# Patient Record
Sex: FEMALE | Race: WHITE | NOT HISPANIC OR LATINO | Employment: OTHER | URBAN - NONMETROPOLITAN AREA
[De-identification: names, ages, dates, MRNs, and addresses within clinical notes are randomized per-mention and may not be internally consistent; named-entity substitution may affect disease eponyms.]

---

## 2019-07-06 ENCOUNTER — APPOINTMENT (OUTPATIENT)
Dept: GENERAL RADIOLOGY | Facility: HOSPITAL | Age: 74
End: 2019-07-06

## 2019-07-06 ENCOUNTER — HOSPITAL ENCOUNTER (INPATIENT)
Facility: HOSPITAL | Age: 74
LOS: 4 days | Discharge: HOME | End: 2019-07-10
Attending: EMERGENCY MEDICINE | Admitting: FAMILY MEDICINE

## 2019-07-06 DIAGNOSIS — J44.1 CHRONIC OBSTRUCTIVE PULMONARY DISEASE WITH ACUTE EXACERBATION (HCC): Primary | ICD-10-CM

## 2019-07-06 DIAGNOSIS — I50.9 CONGESTIVE HEART FAILURE, UNSPECIFIED HF CHRONICITY, UNSPECIFIED HEART FAILURE TYPE (HCC): ICD-10-CM

## 2019-07-06 DIAGNOSIS — E13.8 DIABETES MELLITUS OF OTHER TYPE WITH COMPLICATION, UNSPECIFIED WHETHER LONG TERM INSULIN USE: ICD-10-CM

## 2019-07-06 DIAGNOSIS — I25.118 CORONARY ARTERY DISEASE WITH OTHER FORM OF ANGINA PECTORIS, UNSPECIFIED VESSEL OR LESION TYPE, UNSPECIFIED WHETHER NATIVE OR TRANSPLANTED HEART (HCC): ICD-10-CM

## 2019-07-06 LAB
ALBUMIN SERPL-MCNC: 4.2 G/DL (ref 3.5–5)
ALBUMIN/GLOB SERPL: 1.4 G/DL (ref 1.1–2.5)
ALP SERPL-CCNC: 108 U/L (ref 24–120)
ALT SERPL W P-5'-P-CCNC: 21 U/L (ref 0–54)
ANION GAP SERPL CALCULATED.3IONS-SCNC: 6 MMOL/L (ref 4–13)
ARTERIAL PATENCY WRIST A: POSITIVE
AST SERPL-CCNC: 36 U/L (ref 7–45)
ATMOSPHERIC PRESS: 750 MMHG
BASE EXCESS BLDA CALC-SCNC: 7.1 MMOL/L (ref 0–2)
BASOPHILS # BLD AUTO: 0.05 10*3/MM3 (ref 0–0.2)
BASOPHILS NFR BLD AUTO: 0.4 % (ref 0–2)
BDY SITE: ABNORMAL
BILIRUB SERPL-MCNC: 0.3 MG/DL (ref 0.1–1)
BODY TEMPERATURE: 37 C
BUN BLD-MCNC: 24 MG/DL (ref 5–21)
BUN/CREAT SERPL: 42.1 (ref 7–25)
CALCIUM SPEC-SCNC: 9.2 MG/DL (ref 8.4–10.4)
CHLORIDE SERPL-SCNC: 97 MMOL/L (ref 98–110)
CO2 SERPL-SCNC: 38 MMOL/L (ref 24–31)
CREAT BLD-MCNC: 0.57 MG/DL (ref 0.5–1.4)
D-LACTATE SERPL-SCNC: 1.3 MMOL/L (ref 0.5–2)
DEPRECATED RDW RBC AUTO: 49.7 FL (ref 40–54)
EOSINOPHIL # BLD AUTO: 0.92 10*3/MM3 (ref 0–0.7)
EOSINOPHIL NFR BLD AUTO: 7.7 % (ref 0–4)
ERYTHROCYTE [DISTWIDTH] IN BLOOD BY AUTOMATED COUNT: 14.8 % (ref 12–15)
GAS FLOW AIRWAY: 12 LPM
GFR SERPL CREATININE-BSD FRML MDRD: 104 ML/MIN/1.73
GLOBULIN UR ELPH-MCNC: 3.1 GM/DL
GLUCOSE BLD-MCNC: 169 MG/DL (ref 70–100)
GLUCOSE BLDC GLUCOMTR-MCNC: 230 MG/DL (ref 70–130)
GLUCOSE BLDC GLUCOMTR-MCNC: 262 MG/DL (ref 70–130)
HCO3 BLDA-SCNC: 34.5 MMOL/L (ref 20–26)
HCT VFR BLD AUTO: 38.6 % (ref 37–47)
HGB BLD-MCNC: 11.7 G/DL (ref 12–16)
HOLD SPECIMEN: NORMAL
HOROWITZ INDEX BLD+IHG-RTO: 100 %
IMM GRANULOCYTES # BLD AUTO: 0.03 10*3/MM3 (ref 0–0.05)
IMM GRANULOCYTES NFR BLD AUTO: 0.3 % (ref 0–5)
LYMPHOCYTES # BLD AUTO: 1.53 10*3/MM3 (ref 0.72–4.86)
LYMPHOCYTES NFR BLD AUTO: 12.9 % (ref 15–45)
Lab: ABNORMAL
MCH RBC QN AUTO: 27.9 PG (ref 28–32)
MCHC RBC AUTO-ENTMCNC: 30.3 G/DL (ref 33–36)
MCV RBC AUTO: 92.1 FL (ref 82–98)
MODALITY: ABNORMAL
MONOCYTES # BLD AUTO: 0.73 10*3/MM3 (ref 0.19–1.3)
MONOCYTES NFR BLD AUTO: 6.1 % (ref 4–12)
NEUTROPHILS # BLD AUTO: 8.62 10*3/MM3 (ref 1.87–8.4)
NEUTROPHILS NFR BLD AUTO: 72.6 % (ref 39–78)
NRBC BLD AUTO-RTO: 0 /100 WBC (ref 0–0.2)
PCO2 BLDA: 62.3 MM HG (ref 35–45)
PH BLDA: 7.35 PH UNITS (ref 7.35–7.45)
PLATELET # BLD AUTO: 242 10*3/MM3 (ref 130–400)
PMV BLD AUTO: 10.2 FL (ref 6–12)
PO2 BLDA: 279 MM HG (ref 83–108)
POTASSIUM BLD-SCNC: 4.4 MMOL/L (ref 3.5–5.3)
PROT SERPL-MCNC: 7.3 G/DL (ref 6.3–8.7)
RBC # BLD AUTO: 4.19 10*6/MM3 (ref 4.2–5.4)
SAO2 % BLDCOA: 99.7 % (ref 94–99)
SODIUM BLD-SCNC: 141 MMOL/L (ref 135–145)
VENTILATOR MODE: ABNORMAL
WBC NRBC COR # BLD: 11.88 10*3/MM3 (ref 4.8–10.8)
WHOLE BLOOD HOLD SPECIMEN: NORMAL
WHOLE BLOOD HOLD SPECIMEN: NORMAL

## 2019-07-06 PROCEDURE — 94799 UNLISTED PULMONARY SVC/PX: CPT

## 2019-07-06 PROCEDURE — 93005 ELECTROCARDIOGRAM TRACING: CPT | Performed by: EMERGENCY MEDICINE

## 2019-07-06 PROCEDURE — 85025 COMPLETE CBC W/AUTO DIFF WBC: CPT | Performed by: EMERGENCY MEDICINE

## 2019-07-06 PROCEDURE — 25010000002 METHYLPREDNISOLONE PER 125 MG: Performed by: FAMILY MEDICINE

## 2019-07-06 PROCEDURE — 80053 COMPREHEN METABOLIC PANEL: CPT | Performed by: EMERGENCY MEDICINE

## 2019-07-06 PROCEDURE — 71045 X-RAY EXAM CHEST 1 VIEW: CPT

## 2019-07-06 PROCEDURE — 94760 N-INVAS EAR/PLS OXIMETRY 1: CPT

## 2019-07-06 PROCEDURE — 87040 BLOOD CULTURE FOR BACTERIA: CPT | Performed by: EMERGENCY MEDICINE

## 2019-07-06 PROCEDURE — 36600 WITHDRAWAL OF ARTERIAL BLOOD: CPT

## 2019-07-06 PROCEDURE — 83605 ASSAY OF LACTIC ACID: CPT | Performed by: EMERGENCY MEDICINE

## 2019-07-06 PROCEDURE — 93010 ELECTROCARDIOGRAM REPORT: CPT | Performed by: INTERNAL MEDICINE

## 2019-07-06 PROCEDURE — 82962 GLUCOSE BLOOD TEST: CPT

## 2019-07-06 PROCEDURE — 63710000001 INSULIN LISPRO (HUMAN) PER 5 UNITS: Performed by: FAMILY MEDICINE

## 2019-07-06 PROCEDURE — 94640 AIRWAY INHALATION TREATMENT: CPT

## 2019-07-06 PROCEDURE — 99285 EMERGENCY DEPT VISIT HI MDM: CPT

## 2019-07-06 PROCEDURE — 25010000002 METHYLPREDNISOLONE PER 125 MG: Performed by: EMERGENCY MEDICINE

## 2019-07-06 PROCEDURE — 82803 BLOOD GASES ANY COMBINATION: CPT

## 2019-07-06 RX ORDER — BUDESONIDE 0.5 MG/2ML
0.5 INHALANT ORAL
Status: DISCONTINUED | OUTPATIENT
Start: 2019-07-06 | End: 2019-07-10 | Stop reason: HOSPADM

## 2019-07-06 RX ORDER — METHYLPREDNISOLONE SODIUM SUCCINATE 125 MG/2ML
80 INJECTION, POWDER, LYOPHILIZED, FOR SOLUTION INTRAMUSCULAR; INTRAVENOUS EVERY 8 HOURS
Status: DISCONTINUED | OUTPATIENT
Start: 2019-07-06 | End: 2019-07-08

## 2019-07-06 RX ORDER — DOCUSATE SODIUM 100 MG/1
100 CAPSULE, LIQUID FILLED ORAL 2 TIMES DAILY PRN
COMMUNITY

## 2019-07-06 RX ORDER — BUDESONIDE 0.5 MG/2ML
0.5 INHALANT ORAL 2 TIMES DAILY
COMMUNITY

## 2019-07-06 RX ORDER — ESCITALOPRAM OXALATE 10 MG/1
10 TABLET ORAL DAILY
COMMUNITY

## 2019-07-06 RX ORDER — LORATADINE 10 MG/1
10 TABLET ORAL DAILY
COMMUNITY
End: 2019-07-10 | Stop reason: HOSPADM

## 2019-07-06 RX ORDER — DILTIAZEM HYDROCHLORIDE 120 MG/1
120 CAPSULE, COATED, EXTENDED RELEASE ORAL DAILY
COMMUNITY

## 2019-07-06 RX ORDER — METHYLPREDNISOLONE SODIUM SUCCINATE 125 MG/2ML
125 INJECTION, POWDER, LYOPHILIZED, FOR SOLUTION INTRAMUSCULAR; INTRAVENOUS ONCE
Status: COMPLETED | OUTPATIENT
Start: 2019-07-06 | End: 2019-07-06

## 2019-07-06 RX ORDER — MONTELUKAST SODIUM 10 MG/1
10 TABLET ORAL NIGHTLY
Status: ON HOLD | COMMUNITY
End: 2020-06-01

## 2019-07-06 RX ORDER — ATORVASTATIN CALCIUM 80 MG/1
80 TABLET, FILM COATED ORAL NIGHTLY
COMMUNITY

## 2019-07-06 RX ORDER — ESCITALOPRAM OXALATE 10 MG/1
10 TABLET ORAL DAILY
Status: DISCONTINUED | OUTPATIENT
Start: 2019-07-06 | End: 2019-07-10 | Stop reason: HOSPADM

## 2019-07-06 RX ORDER — IPRATROPIUM BROMIDE AND ALBUTEROL SULFATE 2.5; .5 MG/3ML; MG/3ML
3 SOLUTION RESPIRATORY (INHALATION) EVERY 4 HOURS PRN
Status: ON HOLD | COMMUNITY
End: 2019-07-10 | Stop reason: SDUPTHER

## 2019-07-06 RX ORDER — FLUTICASONE PROPIONATE 50 MCG
2 SPRAY, SUSPENSION (ML) NASAL DAILY
Status: DISCONTINUED | OUTPATIENT
Start: 2019-07-06 | End: 2019-07-10 | Stop reason: HOSPADM

## 2019-07-06 RX ORDER — SODIUM CHLORIDE 0.9 % (FLUSH) 0.9 %
10 SYRINGE (ML) INJECTION AS NEEDED
Status: DISCONTINUED | OUTPATIENT
Start: 2019-07-06 | End: 2019-07-10 | Stop reason: HOSPADM

## 2019-07-06 RX ORDER — NICOTINE POLACRILEX 4 MG
15 LOZENGE BUCCAL
Status: DISCONTINUED | OUTPATIENT
Start: 2019-07-06 | End: 2019-07-07 | Stop reason: SDUPTHER

## 2019-07-06 RX ORDER — CLOPIDOGREL BISULFATE 75 MG/1
75 TABLET ORAL DAILY
Status: DISCONTINUED | OUTPATIENT
Start: 2019-07-06 | End: 2019-07-10 | Stop reason: HOSPADM

## 2019-07-06 RX ORDER — DEXTROSE MONOHYDRATE 25 G/50ML
25 INJECTION, SOLUTION INTRAVENOUS
Status: DISCONTINUED | OUTPATIENT
Start: 2019-07-06 | End: 2019-07-07 | Stop reason: SDUPTHER

## 2019-07-06 RX ORDER — PANTOPRAZOLE SODIUM 40 MG/1
40 TABLET, DELAYED RELEASE ORAL DAILY
COMMUNITY

## 2019-07-06 RX ORDER — DILTIAZEM HYDROCHLORIDE 180 MG/1
180 CAPSULE, COATED, EXTENDED RELEASE ORAL DAILY
Status: DISCONTINUED | OUTPATIENT
Start: 2019-07-06 | End: 2019-07-10 | Stop reason: HOSPADM

## 2019-07-06 RX ORDER — CLOPIDOGREL BISULFATE 75 MG/1
75 TABLET ORAL DAILY
COMMUNITY

## 2019-07-06 RX ORDER — IPRATROPIUM BROMIDE AND ALBUTEROL SULFATE 2.5; .5 MG/3ML; MG/3ML
3 SOLUTION RESPIRATORY (INHALATION) ONCE
Status: COMPLETED | OUTPATIENT
Start: 2019-07-06 | End: 2019-07-06

## 2019-07-06 RX ORDER — PANTOPRAZOLE SODIUM 40 MG/1
40 TABLET, DELAYED RELEASE ORAL DAILY
Status: DISCONTINUED | OUTPATIENT
Start: 2019-07-06 | End: 2019-07-10 | Stop reason: HOSPADM

## 2019-07-06 RX ORDER — ATORVASTATIN CALCIUM 40 MG/1
80 TABLET, FILM COATED ORAL NIGHTLY
Status: DISCONTINUED | OUTPATIENT
Start: 2019-07-06 | End: 2019-07-10 | Stop reason: HOSPADM

## 2019-07-06 RX ORDER — ARFORMOTEROL TARTRATE 15 UG/2ML
15 SOLUTION RESPIRATORY (INHALATION)
COMMUNITY

## 2019-07-06 RX ORDER — FLUTICASONE PROPIONATE 50 MCG
2 SPRAY, SUSPENSION (ML) NASAL DAILY PRN
COMMUNITY

## 2019-07-06 RX ORDER — GUAIFENESIN 600 MG/1
1200 TABLET, EXTENDED RELEASE ORAL EVERY 12 HOURS SCHEDULED
Status: DISCONTINUED | OUTPATIENT
Start: 2019-07-06 | End: 2019-07-10 | Stop reason: HOSPADM

## 2019-07-06 RX ORDER — IPRATROPIUM BROMIDE AND ALBUTEROL SULFATE 2.5; .5 MG/3ML; MG/3ML
3 SOLUTION RESPIRATORY (INHALATION)
Status: DISCONTINUED | OUTPATIENT
Start: 2019-07-06 | End: 2019-07-10 | Stop reason: HOSPADM

## 2019-07-06 RX ADMIN — CLOPIDOGREL 75 MG: 75 TABLET, FILM COATED ORAL at 17:40

## 2019-07-06 RX ADMIN — IPRATROPIUM BROMIDE AND ALBUTEROL SULFATE 3 ML: 2.5; .5 SOLUTION RESPIRATORY (INHALATION) at 13:59

## 2019-07-06 RX ADMIN — INSULIN LISPRO 6 UNITS: 100 INJECTION, SOLUTION INTRAVENOUS; SUBCUTANEOUS at 20:20

## 2019-07-06 RX ADMIN — METFORMIN HYDROCHLORIDE 500 MG: 500 TABLET ORAL at 17:40

## 2019-07-06 RX ADMIN — FLUTICASONE PROPIONATE 2 SPRAY: 50 SPRAY, METERED NASAL at 14:59

## 2019-07-06 RX ADMIN — GUAIFENESIN 1200 MG: 600 TABLET, EXTENDED RELEASE ORAL at 20:08

## 2019-07-06 RX ADMIN — DESMOPRESSIN ACETATE 80 MG: 0.2 TABLET ORAL at 20:08

## 2019-07-06 RX ADMIN — BUDESONIDE 0.5 MG: 0.5 INHALANT RESPIRATORY (INHALATION) at 14:00

## 2019-07-06 RX ADMIN — IPRATROPIUM BROMIDE AND ALBUTEROL SULFATE 3 ML: 2.5; .5 SOLUTION RESPIRATORY (INHALATION) at 12:19

## 2019-07-06 RX ADMIN — IPRATROPIUM BROMIDE AND ALBUTEROL SULFATE 3 ML: 2.5; .5 SOLUTION RESPIRATORY (INHALATION) at 22:36

## 2019-07-06 RX ADMIN — INSULIN LISPRO 4 UNITS: 100 INJECTION, SOLUTION INTRAVENOUS; SUBCUTANEOUS at 17:39

## 2019-07-06 RX ADMIN — DILTIAZEM HYDROCHLORIDE 180 MG: 180 CAPSULE, COATED, EXTENDED RELEASE ORAL at 14:59

## 2019-07-06 RX ADMIN — IPRATROPIUM BROMIDE AND ALBUTEROL SULFATE 3 ML: 2.5; .5 SOLUTION RESPIRATORY (INHALATION) at 18:53

## 2019-07-06 RX ADMIN — METHYLPREDNISOLONE SODIUM SUCCINATE 80 MG: 125 INJECTION, POWDER, FOR SOLUTION INTRAMUSCULAR; INTRAVENOUS at 17:40

## 2019-07-06 RX ADMIN — METHYLPREDNISOLONE SODIUM SUCCINATE 125 MG: 125 INJECTION, POWDER, FOR SOLUTION INTRAMUSCULAR; INTRAVENOUS at 10:28

## 2019-07-06 RX ADMIN — ESCITALOPRAM 10 MG: 10 TABLET, FILM COATED ORAL at 14:59

## 2019-07-06 RX ADMIN — PANTOPRAZOLE SODIUM 40 MG: 40 TABLET, DELAYED RELEASE ORAL at 14:58

## 2019-07-06 NOTE — H&P
[facility] Medicine Services  HISTORY AND PHYSICAL    Date of Admission: 7/6/2019  Primary Care Physician: Provider, No Known    Subjective     Chief Complaint: SOA    History of Present Illness  Ms. Velasquez is a pleasant 73 year old lady with a history of tobacco abuse and COPD.  She came to [place] to visit her sister.  She lives in [place] about 6 hours from here.  Today she began to feel very SOA.  She is coughing and wheezing.  She denies fevers or chills.  She denies leg swelling or weight gain.  She denies hemoptysis or leg swelling.  She denies recent surgery or immobilization.        Review of Systems   Constitutional: Negative for fatigue and fever.   HENT: Negative for congestion and ear pain.    Eyes: Negative for pain and visual disturbance.   Respiratory: Positive for cough, shortness of breath and wheezing.    Cardiovascular: Negative for chest pain and palpitations.   Gastrointestinal: Negative for diarrhea, nausea and vomiting.   Endocrine: Negative for heat intolerance.   Genitourinary: Negative for dysuria and frequency.   Musculoskeletal: Negative for arthralgias and back pain.   Skin: Negative for rash and wound.   Neurological: Negative for dizziness and light-headedness.   Psychiatric/Behavioral: Negative for confusion. The patient is not nervous/anxious.    All other systems reviewed and are negative.       Otherwise complete ROS reviewed and negative except as mentioned in the HPI.    Past Medical History:   Past Medical History:   Diagnosis Date   • CHF (congestive heart failure) (CMS/HCC)    • COPD (chronic obstructive pulmonary disease) (CMS/HCC)    • Diabetes mellitus (CMS/HCC)    • Emphysema lung (CMS/HCC)    • Mediastinal lymphadenopathy    • Stroke (CMS/HCC)      Past Surgical History:  Past Surgical History:   Procedure Laterality Date   • CAROTID STENT     • CHOLECYSTECTOMY     • COLONOSCOPY     • ESOPHAGUS SURGERY     • SIGMOIDOSCOPY        Social History:  reports that she has quit smoking. She does not have any smokeless tobacco history on file. She reports that she drinks alcohol. She reports that she does not use drugs.    Family History: HTN    Allergies:  Allergies   Allergen Reactions   • Latex Rash     Medications:  Prior to Admission medications    Medication Sig Start Date End Date Taking? Authorizing Provider   arformoterol (BROVANA) 15 MCG/2ML nebulizer solution Take  by nebulization 2 (Two) Times a Day.   Yes Angelica Olson MD   atorvastatin (LIPITOR) 80 MG tablet Take 80 mg by mouth Daily.   Yes Angelica Olson MD   budesonide (PULMICORT) 0.5 MG/2ML nebulizer solution Take 0.5 mg by nebulization Daily.   Yes Angelica Olson MD   clopidogrel (PLAVIX) 75 MG tablet Take 75 mg by mouth Daily.   Yes Angelica Olson MD   diltiaZEM CD (CARDIZEM CD) 180 MG 24 hr capsule Take 180 mg by mouth Daily.   Yes Angelica Olson MD   docusate sodium (COLACE) 100 MG capsule Take 100 mg by mouth 2 (Two) Times a Day.   Yes Angelica Olson MD   escitalopram (LEXAPRO) 10 MG tablet Take 10 mg by mouth Daily.   Yes Angelica Olson MD   fluticasone (FLONASE) 50 MCG/ACT nasal spray 2 sprays into the nostril(s) as directed by provider Daily.   Yes Angelica Olson MD   insulin lispro (humaLOG) 100 UNIT/ML injection Inject 200 Units under the skin into the appropriate area as directed 3 (Three) Times a Day Before Meals.   Yes Angelica Olson MD   ipratropium-albuterol (DUO-NEB) 0.5-2.5 mg/3 ml nebulizer Take 3 mL by nebulization Every 4 (Four) Hours As Needed for Wheezing.   Yes Angelica Olson MD   loratadine (CLARITIN) 10 MG tablet Take 10 mg by mouth Daily.   Yes Angelica Olson MD   metFORMIN (GLUCOPHAGE) 500 MG tablet Take 500 mg by mouth 2 (Two) Times a Day With Meals.   Yes Angelica Olson MD   montelukast (SINGULAIR) 10 MG tablet Take 10 mg by mouth Every Night.   Yes Wesley  "MD Angelica   pantoprazole (PROTONIX) 40 MG EC tablet Take 40 mg by mouth Daily.   Yes Provider, MD Angelica   tiotropium (SPIRIVA) 18 MCG per inhalation capsule Place 1 capsule into inhaler and inhale Daily.   Yes Provider, MD Angelica     Objective     Vital Signs: BP (!) 148/135   Pulse 118   Temp 97.8 °F (36.6 °C) (Oral)   Resp 22   Ht 152.4 cm (60\")   Wt 53.9 kg (118 lb 14.4 oz)   SpO2 (!) 87%   BMI 23.22 kg/m²   Physical Exam   Constitutional: She is oriented to person, place, and time. She appears well-developed and well-nourished.   HENT:   Head: Normocephalic and atraumatic.   Right Ear: External ear normal.   Left Ear: External ear normal.   Nose: Nose normal.   Mouth/Throat: Oropharynx is clear and moist.   Eyes: Conjunctivae and EOM are normal.   Neck: Normal range of motion. Neck supple.   Cardiovascular: Normal rate, regular rhythm and normal heart sounds.   Pulmonary/Chest: Accessory muscle usage present. Tachypnea noted. She is in respiratory distress. She has decreased breath sounds. She has wheezes.   Abdominal: Soft. Bowel sounds are normal. She exhibits no distension. There is no tenderness.   Musculoskeletal: Normal range of motion.   Neurological: She is alert and oriented to person, place, and time.   Skin: Skin is warm and dry.   Psychiatric: She has a normal mood and affect. Her speech is normal and behavior is normal. Cognition and memory are normal.         Results Reviewed:  Lab Results (last 24 hours)     Procedure Component Value Units Date/Time    Blood Gas, Arterial [[id number]]  (Abnormal) Collected:  07/06/19 1310    Specimen:  Arterial Blood Updated:  07/06/19 1316     Site Right Radial     Aneesh's Test Positive     pH, Arterial 7.352 pH units      pCO2, Arterial 62.3 mm Hg      Comment: 83 Value above reference range        pO2, Arterial 279.0 mm Hg      Comment: 83 Value above reference range        HCO3, Arterial 34.5 mmol/L      Comment: 83 Value above reference " range        Base Excess, Arterial 7.1 mmol/L      Comment: 83 Value above reference range        O2 Saturation, Arterial 99.7 %      Comment: 83 Value above reference range        Temperature 37.0 C      Barometric Pressure for Blood Gas 750 mmHg      Modality NRB     FIO2 100 %      Flow Rate 12.0 lpm      Ventilator Mode NA     Collected by [id number]     Comment: Meter: [id number]     :  [id number]       [place] Draw [[id number]] Collected:  07/06/19 1004    Specimen:  Blood Updated:  07/06/19 1145    Narrative:       The following orders were created for panel order [place] Draw.  Procedure                               Abnormality         Status                     ---------                               -----------         ------                     Light Blue Top[[id number]]                                   Final result               Green Top (Gel)[[id number]]                                  Final result               Lavender Top[[id number]]                                     Final result               Red Top[[id number]]                                          Final result               [place] Blood Culture Cornelius...[[id number]]                      Final result                 Please view results for these tests on the individual orders.    Green Top (Gel) [[id number]] Collected:  07/06/19 1004    Specimen:  Blood from Arm, Right Updated:  07/06/19 1145     Extra Tube Hold for add-ons.     Comment: Auto resulted.       Light Blue Top [[id number]] Collected:  07/06/19 1004    Specimen:  Blood from Arm, Right Updated:  07/06/19 1116     Extra Tube hold for add-on     Comment: Auto resulted       Lavender Top [[id number]] Collected:  07/06/19 1004    Specimen:  Blood from Arm, Right Updated:  07/06/19 1116     Extra Tube hold for add-on     Comment: Auto resulted       Red Top [[id number]] Collected:  07/06/19 1004    Specimen:  Blood from Arm, Right Updated:  07/06/19 1116     Extra Tube Hold for add-ons.      Comment: Auto resulted.       [place] Blood Culture Bottle Set [[id number]] Collected:  07/06/19 1004    Specimen:  Blood from Arm, Right Updated:  07/06/19 1116     Extra Tube Hold for add-ons.     Comment: Auto resulted.       Lactic Acid, Plasma [[id number]]  (Normal) Collected:  07/06/19 1004    Specimen:  Blood from Arm, Right Updated:  07/06/19 1049     Lactate 1.3 mmol/L     Comprehensive Metabolic Panel [[id number]]  (Abnormal) Collected:  07/06/19 1004    Specimen:  Blood from Arm, Right Updated:  07/06/19 1048     Glucose 169 mg/dL      BUN 24 mg/dL      Creatinine 0.57 mg/dL      Sodium 141 mmol/L      Potassium 4.4 mmol/L      Chloride 97 mmol/L      CO2 38.0 mmol/L      Calcium 9.2 mg/dL      Total Protein 7.3 g/dL      Albumin 4.20 g/dL      ALT (SGPT) 21 U/L      AST (SGOT) 36 U/L      Alkaline Phosphatase 108 U/L      Total Bilirubin 0.3 mg/dL      eGFR Non African Amer 104 mL/min/1.73      Globulin 3.1 gm/dL      A/G Ratio 1.4 g/dL      BUN/Creatinine Ratio 42.1     Anion Gap 6.0 mmol/L     Narrative:       GFR Normal >60  Chronic Kidney Disease <60  Kidney Failure <15    Blood Culture - Blood, Arm, Right [[id number]] Collected:  07/06/19 1004    Specimen:  Blood from Arm, Right Updated:  07/06/19 1035    CBC & Differential [[id number]] Collected:  07/06/19 1004    Specimen:  Blood Updated:  07/06/19 1034    Narrative:       The following orders were created for panel order CBC & Differential.  Procedure                               Abnormality         Status                     ---------                               -----------         ------                     CBC Auto Differential[[id number]]        Abnormal            Final result                 Please view results for these tests on the individual orders.    CBC Auto Differential [[id number]]  (Abnormal) Collected:  07/06/19 1004    Specimen:  Blood from Arm, Right Updated:  07/06/19 1034     WBC 11.88 10*3/mm3      RBC 4.19 10*6/mm3       Hemoglobin 11.7 g/dL      Hematocrit 38.6 %      MCV 92.1 fL      MCH 27.9 pg      MCHC 30.3 g/dL      RDW 14.8 %      RDW-SD 49.7 fl      MPV 10.2 fL      Platelets 242 10*3/mm3      Neutrophil % 72.6 %      Lymphocyte % 12.9 %      Monocyte % 6.1 %      Eosinophil % 7.7 %      Basophil % 0.4 %      Immature Grans % 0.3 %      Neutrophils, Absolute 8.62 10*3/mm3      Lymphocytes, Absolute 1.53 10*3/mm3      Monocytes, Absolute 0.73 10*3/mm3      Eosinophils, Absolute 0.92 10*3/mm3      Basophils, Absolute 0.05 10*3/mm3      Immature Grans, Absolute 0.03 10*3/mm3      nRBC 0.0 /100 WBC     Blood Culture - Blood, Arm, Left [[id number]] Collected:  07/06/19 1005    Specimen:  Blood from Arm, Left Updated:  07/06/19 1034        Imaging Results (last 24 hours)     Procedure Component Value Units Date/Time    XR Chest 1 View [[id number]] Collected:  07/06/19 1057     Updated:  07/06/19 1101    Narrative:       EXAMINATION: XR CHEST 1 VW-. 7/6/2019 10:57 AM CDT     CHEST, ONE VIEW:     HISTORY: Cough and shortness of breath     COMPARISON: None     A single frontal chest radiograph was obtained.     FINDINGS:     COPD and probable chronic lung changes identified.     There are no parenchymal infiltrates.     The heart is normal in size, pulmonary circulation appropriate, without  heart failure.     Aortic arch calcifications noted.     Surgical changes noted in the left neck.     No acute osseous abnormalities appreciated.                                     Impression:       1. Probable chronic lung changes.  2. No definite acute cardiopulmonary process.     This report was finalized on 07/06/2019 10:58 by Dr. Patric Benson MD.        I have personally reviewed and interpreted the radiology studies and ECG obtained at time of admission.     Assessment / Plan     Assessment:   Active Hospital Problems    Diagnosis   • Chronic obstructive pulmonary disease with acute exacerbation (CMS/HCC)       1.  Acute Hypoxic Respiratory  Failure  - nebs  -IV abx  -IV steroids  -Mucinex  -Flutter    2.  COPD AE  - nebs  -IV abx  -IV steroids  -Mucinex  -Flutter    3.  T2DM  -SSI    4.  Tobacco abuse  -previously quit    Fairly low degree of suspicion for Pulmonary embolism at this point.  Given her degree of wheezing, this is most likely COPD exacerbation.  Wells Score for PE is only 1.5, making her low risk.  Legs are symmetrical without swelling or edema           Code Status: full     I discussed the patient's findings and my recommendations with the patient, patient's sister    Estimated length of stay 3-4 days    Aubrey Parker MD   07/06/19   3:01 PM

## 2019-07-06 NOTE — NURSING NOTE
Patient brought to floor by ED nurse. ED nurse and RN transferred patient over from stretcher to bed via draw sheet.  Patient appeared to be SOA with labored breathing. RN placed patient on continuous pulse ox and saturations were 83% on 5L NC; O2 increased to 6L, saturations maintained in low 80's. MD in room during ED report to RN.  MD requested a BIPAP. RN placed patient on non-re breather saturations increased to 100%, respiratory called and informed about BIPAP. Patient then transferred to unit per MD orders.  Simin Fernandez, RN

## 2019-07-06 NOTE — PLAN OF CARE
Problem: Patient Care Overview  Goal: Plan of Care Review  Outcome: Ongoing (interventions implemented as appropriate)   07/06/19 7822   Coping/Psychosocial   Plan of Care Reviewed With patient;sibling   Plan of Care Review   Progress improving   OTHER   Outcome Summary Pt has been tolerating her home level of O2 at 4L NC. She has been up to the c. She remains SOA when active.       Problem: Fall Risk (Adult)  Goal: Identify Related Risk Factors and Signs and Symptoms  Outcome: Ongoing (interventions implemented as appropriate)

## 2019-07-06 NOTE — ED PROVIDER NOTES
Subjective   Patient complains of cough and shortness of breath that is actually been going for a couple days but woke up much worse this morning.  She could not catch her breath at all this morning despite her oxygen.  She called 911 and the paramedics arrived where she was giving herself one breathing treatment and they gave another.  She is apparently better right now though not clear.  Her cough is been nonproductive.  She has not had any fever or chills.  She is normally on 4 L of oxygen at all time but did raise it to 5 last night.        History provided by:  Patient   used: No    Shortness of Breath   Severity:  Severe  Onset quality:  Gradual  Duration:  2 days  Timing:  Constant  Progression:  Worsening  Chronicity:  Recurrent  Context: not activity, not animal exposure, not emotional upset, not fumes, not known allergens, not occupational exposure, not pollens, not smoke exposure, not strong odors, not URI and not weather changes    Relieved by:  Nothing  Worsened by:  Nothing  Ineffective treatments:  None tried  Associated symptoms: cough and wheezing    Associated symptoms: no abdominal pain, no chest pain, no claudication, no diaphoresis, no ear pain, no fever, no headaches, no neck pain, no rash, no sore throat, no sputum production, no swollen glands and no vomiting    Risk factors: no recent alcohol use, no family hx of DVT, no hx of PE/DVT, no obesity, no prolonged immobilization, no recent surgery and no tobacco use        Review of Systems   Constitutional: Negative.  Negative for diaphoresis and fever.   HENT: Negative.  Negative for ear pain and sore throat.    Respiratory: Positive for cough, shortness of breath and wheezing. Negative for sputum production.    Cardiovascular: Negative.  Negative for chest pain and claudication.   Gastrointestinal: Negative.  Negative for abdominal pain and vomiting.   Genitourinary: Negative.    Musculoskeletal: Negative.  Negative for  neck pain.   Skin: Negative.  Negative for rash.   Neurological: Negative.  Negative for headaches.   Psychiatric/Behavioral: Negative.    All other systems reviewed and are negative.      Past Medical History:   Diagnosis Date   • CHF (congestive heart failure) (CMS/HCC)    • COPD (chronic obstructive pulmonary disease) (CMS/HCC)    • Diabetes mellitus (CMS/HCC)    • Emphysema lung (CMS/HCC)    • Mediastinal lymphadenopathy    • Stroke (CMS/HCC)        No Known Allergies    Past Surgical History:   Procedure Laterality Date   • CAROTID STENT     • CHOLECYSTECTOMY     • COLONOSCOPY     • ESOPHAGUS SURGERY     • SIGMOIDOSCOPY         History reviewed. No pertinent family history.    Social History     Socioeconomic History   • Marital status:      Spouse name: Not on file   • Number of children: Not on file   • Years of education: Not on file   • Highest education level: Not on file   Tobacco Use   • Smoking status: Former Smoker   Substance and Sexual Activity   • Alcohol use: Yes     Comment: OCCASSIONAL   • Drug use: No   • Sexual activity: Defer       Prior to Admission medications    Medication Sig Start Date End Date Taking? Authorizing Provider   arformoterol (BROVANA) 15 MCG/2ML nebulizer solution Take  by nebulization 2 (Two) Times a Day.   Yes Aneglica Olson MD   atorvastatin (LIPITOR) 80 MG tablet Take 80 mg by mouth Daily.   Yes Angelica Olson MD   budesonide (PULMICORT) 0.5 MG/2ML nebulizer solution Take 0.5 mg by nebulization Daily.   Yes Angelica Olson MD   clopidogrel (PLAVIX) 75 MG tablet Take 75 mg by mouth Daily.   Yes Angelica Olson MD   diltiaZEM CD (CARDIZEM CD) 180 MG 24 hr capsule Take 180 mg by mouth Daily.   Yes Angelica Olson MD   docusate sodium (COLACE) 100 MG capsule Take 100 mg by mouth 2 (Two) Times a Day.   Yes Angelica Olson MD   escitalopram (LEXAPRO) 10 MG tablet Take 10 mg by mouth Daily.   Yes Angelica Olson MD   fluticasone  (FLONASE) 50 MCG/ACT nasal spray 2 sprays into the nostril(s) as directed by provider Daily.   Yes Angelica Olson MD   insulin lispro (humaLOG) 100 UNIT/ML injection Inject 200 Units under the skin into the appropriate area as directed 3 (Three) Times a Day Before Meals.   Yes Angelica Olson MD   ipratropium-albuterol (DUO-NEB) 0.5-2.5 mg/3 ml nebulizer Take 3 mL by nebulization Every 4 (Four) Hours As Needed for Wheezing.   Yes Angelica Olson MD   loratadine (CLARITIN) 10 MG tablet Take 10 mg by mouth Daily.   Yes Angelica Olson MD   metFORMIN (GLUCOPHAGE) 500 MG tablet Take 500 mg by mouth 2 (Two) Times a Day With Meals.   Yes Angelica Olson MD   montelukast (SINGULAIR) 10 MG tablet Take 10 mg by mouth Every Night.   Yes Angelica Olson MD   pantoprazole (PROTONIX) 40 MG EC tablet Take 40 mg by mouth Daily.   Yes Angelica Olson MD   tiotropium (SPIRIVA) 18 MCG per inhalation capsule Place 1 capsule into inhaler and inhale Daily.   Yes Angelica Olson MD       Medications   sodium chloride 0.9 % flush 10 mL (not administered)   methylPREDNISolone sodium succinate (SOLU-Medrol) injection 125 mg (125 mg Intravenous Given 7/6/19 1028)   ipratropium-albuterol (DUO-NEB) nebulizer solution 3 mL (3 mL Nebulization Given 7/6/19 1219)       Vitals:    07/06/19 1213   BP:    Pulse:    Resp:    Temp:    SpO2: 91%         Objective   Physical Exam   Constitutional: She is oriented to person, place, and time. She appears well-developed and well-nourished.   HENT:   Head: Normocephalic and atraumatic.   Neck: Normal range of motion. Neck supple.   Cardiovascular: Regular rhythm.   Patient is mildly tachycardic.   Pulmonary/Chest: Tachypnea noted. She is in respiratory distress. She has wheezes in the right middle field and the left middle field.   Patient is tachypneic and mildly labored.  She does have some retractions.  She has diffuse wheezes bilaterally but this mostly  with expiration.   Abdominal: Soft. Bowel sounds are normal.   Musculoskeletal: Normal range of motion.        Right lower leg: Normal.        Left lower leg: Normal.   Neurological: She is alert and oriented to person, place, and time.   Skin: Skin is warm and dry.   Psychiatric: She has a normal mood and affect. Her behavior is normal.   Nursing note and vitals reviewed.      Procedures         Lab Results (last 24 hours)     Procedure Component Value Units Date/Time    [place] Blood Culture Bottle Set [[id number]] Collected:  07/06/19 1004    Specimen:  Blood from Arm, Right Updated:  07/06/19 1116     Extra Tube Hold for add-ons.     Comment: Auto resulted.       CBC & Differential [[id number]] Collected:  07/06/19 1004    Specimen:  Blood Updated:  07/06/19 1034    Narrative:       The following orders were created for panel order CBC & Differential.  Procedure                               Abnormality         Status                     ---------                               -----------         ------                     CBC Auto Differential[[id number]]        Abnormal            Final result                 Please view results for these tests on the individual orders.    Comprehensive Metabolic Panel [[id number]]  (Abnormal) Collected:  07/06/19 1004    Specimen:  Blood from Arm, Right Updated:  07/06/19 1048     Glucose 169 mg/dL      BUN 24 mg/dL      Creatinine 0.57 mg/dL      Sodium 141 mmol/L      Potassium 4.4 mmol/L      Chloride 97 mmol/L      CO2 38.0 mmol/L      Calcium 9.2 mg/dL      Total Protein 7.3 g/dL      Albumin 4.20 g/dL      ALT (SGPT) 21 U/L      AST (SGOT) 36 U/L      Alkaline Phosphatase 108 U/L      Total Bilirubin 0.3 mg/dL      eGFR Non African Amer 104 mL/min/1.73      Globulin 3.1 gm/dL      A/G Ratio 1.4 g/dL      BUN/Creatinine Ratio 42.1     Anion Gap 6.0 mmol/L     Narrative:       GFR Normal >60  Chronic Kidney Disease <60  Kidney Failure <15    Blood Culture - Blood, Arm, Right  [[id number]] Collected:  07/06/19 1004    Specimen:  Blood from Arm, Right Updated:  07/06/19 1035    Lactic Acid, Plasma [[id number]]  (Normal) Collected:  07/06/19 1004    Specimen:  Blood from Arm, Right Updated:  07/06/19 1049     Lactate 1.3 mmol/L     CBC Auto Differential [[id number]]  (Abnormal) Collected:  07/06/19 1004    Specimen:  Blood from Arm, Right Updated:  07/06/19 1034     WBC 11.88 10*3/mm3      RBC 4.19 10*6/mm3      Hemoglobin 11.7 g/dL      Hematocrit 38.6 %      MCV 92.1 fL      MCH 27.9 pg      MCHC 30.3 g/dL      RDW 14.8 %      RDW-SD 49.7 fl      MPV 10.2 fL      Platelets 242 10*3/mm3      Neutrophil % 72.6 %      Lymphocyte % 12.9 %      Monocyte % 6.1 %      Eosinophil % 7.7 %      Basophil % 0.4 %      Immature Grans % 0.3 %      Neutrophils, Absolute 8.62 10*3/mm3      Lymphocytes, Absolute 1.53 10*3/mm3      Monocytes, Absolute 0.73 10*3/mm3      Eosinophils, Absolute 0.92 10*3/mm3      Basophils, Absolute 0.05 10*3/mm3      Immature Grans, Absolute 0.03 10*3/mm3      nRBC 0.0 /100 WBC     Blood Culture - Blood, Arm, Left [[id number]] Collected:  07/06/19 1005    Specimen:  Blood from Arm, Left Updated:  07/06/19 1034          XR Chest 1 View   Final Result   1. Probable chronic lung changes.   2. No definite acute cardiopulmonary process.       This report was finalized on 07/06/2019 10:58 by Dr. Patric Benson MD.          ED Course  ED Course as of Jul 06 1220   Sat Jul 06, 2019   1219 Patient's pulse ox does drop in the mid 80s when she talks or has any movement at all.  Her chest x-ray shows chronic changes but nothing new.  She does not have any clinical signs of sepsis and her abnormal respiratory rate and tachycardia are due to her underlying illness of COPD.  However she will require admission for further stabilization.  [TR]      ED Course User Index  [TR] Hugo Farooq Jr., MD          MDM  Number of Diagnoses or Management Options  Chronic obstructive pulmonary disease  with acute exacerbation (CMS/HCC): new and requires workup     Amount and/or Complexity of Data Reviewed  Clinical lab tests: ordered and reviewed  Tests in the radiology section of CPT®: ordered and reviewed  Tests in the medicine section of CPT®: ordered and reviewed  Discuss the patient with other providers: yes    Risk of Complications, Morbidity, and/or Mortality  Presenting problems: moderate  Diagnostic procedures: moderate  Management options: moderate    Patient Progress  Patient progress: stable      Final diagnoses:   Chronic obstructive pulmonary disease with acute exacerbation (CMS/HCC)          Hugo Farooq Jr., MD  07/06/19 7247

## 2019-07-07 LAB
ALBUMIN SERPL-MCNC: 4 G/DL (ref 3.5–5)
ALBUMIN/GLOB SERPL: 1.4 G/DL (ref 1.1–2.5)
ALP SERPL-CCNC: 101 U/L (ref 24–120)
ALT SERPL W P-5'-P-CCNC: 23 U/L (ref 0–54)
ANION GAP SERPL CALCULATED.3IONS-SCNC: 8 MMOL/L (ref 4–13)
AST SERPL-CCNC: 25 U/L (ref 7–45)
BASOPHILS # BLD AUTO: 0.01 10*3/MM3 (ref 0–0.2)
BASOPHILS NFR BLD AUTO: 0.1 % (ref 0–2)
BILIRUB SERPL-MCNC: 0.4 MG/DL (ref 0.1–1)
BUN BLD-MCNC: 21 MG/DL (ref 5–21)
BUN/CREAT SERPL: 48.8 (ref 7–25)
CALCIUM SPEC-SCNC: 9.4 MG/DL (ref 8.4–10.4)
CHLORIDE SERPL-SCNC: 98 MMOL/L (ref 98–110)
CO2 SERPL-SCNC: 34 MMOL/L (ref 24–31)
CREAT BLD-MCNC: 0.43 MG/DL (ref 0.5–1.4)
DEPRECATED RDW RBC AUTO: 46.8 FL (ref 40–54)
EOSINOPHIL # BLD AUTO: 0 10*3/MM3 (ref 0–0.7)
EOSINOPHIL NFR BLD AUTO: 0 % (ref 0–4)
ERYTHROCYTE [DISTWIDTH] IN BLOOD BY AUTOMATED COUNT: 14.6 % (ref 12–15)
GFR SERPL CREATININE-BSD FRML MDRD: 144 ML/MIN/1.73
GLOBULIN UR ELPH-MCNC: 2.8 GM/DL
GLUCOSE BLD-MCNC: 208 MG/DL (ref 70–100)
GLUCOSE BLDC GLUCOMTR-MCNC: 212 MG/DL (ref 70–130)
GLUCOSE BLDC GLUCOMTR-MCNC: 217 MG/DL (ref 70–130)
GLUCOSE BLDC GLUCOMTR-MCNC: 282 MG/DL (ref 70–130)
GLUCOSE BLDC GLUCOMTR-MCNC: 289 MG/DL (ref 70–130)
HCT VFR BLD AUTO: 34.5 % (ref 37–47)
HGB BLD-MCNC: 10.7 G/DL (ref 12–16)
IMM GRANULOCYTES # BLD AUTO: 0.03 10*3/MM3 (ref 0–0.05)
IMM GRANULOCYTES NFR BLD AUTO: 0.4 % (ref 0–5)
LYMPHOCYTES # BLD AUTO: 0.7 10*3/MM3 (ref 0.72–4.86)
LYMPHOCYTES NFR BLD AUTO: 9.1 % (ref 15–45)
MCH RBC QN AUTO: 27.7 PG (ref 28–32)
MCHC RBC AUTO-ENTMCNC: 31 G/DL (ref 33–36)
MCV RBC AUTO: 89.4 FL (ref 82–98)
MONOCYTES # BLD AUTO: 0.05 10*3/MM3 (ref 0.19–1.3)
MONOCYTES NFR BLD AUTO: 0.7 % (ref 4–12)
NEUTROPHILS # BLD AUTO: 6.9 10*3/MM3 (ref 1.87–8.4)
NEUTROPHILS NFR BLD AUTO: 89.7 % (ref 39–78)
NRBC BLD AUTO-RTO: 0 /100 WBC (ref 0–0.2)
PLATELET # BLD AUTO: 219 10*3/MM3 (ref 130–400)
PMV BLD AUTO: 10.1 FL (ref 6–12)
POTASSIUM BLD-SCNC: 4 MMOL/L (ref 3.5–5.3)
PROT SERPL-MCNC: 6.8 G/DL (ref 6.3–8.7)
RBC # BLD AUTO: 3.86 10*6/MM3 (ref 4.2–5.4)
SODIUM BLD-SCNC: 140 MMOL/L (ref 135–145)
WBC NRBC COR # BLD: 7.69 10*3/MM3 (ref 4.8–10.8)

## 2019-07-07 PROCEDURE — 63710000001 INSULIN LISPRO (HUMAN) PER 5 UNITS: Performed by: FAMILY MEDICINE

## 2019-07-07 PROCEDURE — 94799 UNLISTED PULMONARY SVC/PX: CPT

## 2019-07-07 PROCEDURE — 94760 N-INVAS EAR/PLS OXIMETRY 1: CPT

## 2019-07-07 PROCEDURE — 85025 COMPLETE CBC W/AUTO DIFF WBC: CPT | Performed by: FAMILY MEDICINE

## 2019-07-07 PROCEDURE — 94668 MNPJ CHEST WALL SBSQ: CPT

## 2019-07-07 PROCEDURE — 82962 GLUCOSE BLOOD TEST: CPT

## 2019-07-07 PROCEDURE — 80053 COMPREHEN METABOLIC PANEL: CPT | Performed by: FAMILY MEDICINE

## 2019-07-07 PROCEDURE — 25010000002 METHYLPREDNISOLONE PER 125 MG: Performed by: FAMILY MEDICINE

## 2019-07-07 PROCEDURE — 94667 MNPJ CHEST WALL 1ST: CPT

## 2019-07-07 PROCEDURE — 25010000002 CEFTRIAXONE PER 250 MG: Performed by: FAMILY MEDICINE

## 2019-07-07 RX ORDER — DEXTROSE MONOHYDRATE 25 G/50ML
25 INJECTION, SOLUTION INTRAVENOUS
Status: DISCONTINUED | OUTPATIENT
Start: 2019-07-07 | End: 2019-07-08

## 2019-07-07 RX ORDER — NICOTINE POLACRILEX 4 MG
15 LOZENGE BUCCAL
Status: DISCONTINUED | OUTPATIENT
Start: 2019-07-07 | End: 2019-07-08

## 2019-07-07 RX ORDER — HYDROXYZINE HYDROCHLORIDE 25 MG/1
25 TABLET, FILM COATED ORAL EVERY 6 HOURS PRN
Status: DISCONTINUED | OUTPATIENT
Start: 2019-07-07 | End: 2019-07-10 | Stop reason: HOSPADM

## 2019-07-07 RX ADMIN — INSULIN LISPRO 8 UNITS: 100 INJECTION, SOLUTION INTRAVENOUS; SUBCUTANEOUS at 17:09

## 2019-07-07 RX ADMIN — DILTIAZEM HYDROCHLORIDE 180 MG: 180 CAPSULE, COATED, EXTENDED RELEASE ORAL at 08:39

## 2019-07-07 RX ADMIN — IPRATROPIUM BROMIDE AND ALBUTEROL SULFATE 3 ML: 2.5; .5 SOLUTION RESPIRATORY (INHALATION) at 19:31

## 2019-07-07 RX ADMIN — ESCITALOPRAM 10 MG: 10 TABLET, FILM COATED ORAL at 08:39

## 2019-07-07 RX ADMIN — IPRATROPIUM BROMIDE AND ALBUTEROL SULFATE 3 ML: 2.5; .5 SOLUTION RESPIRATORY (INHALATION) at 02:34

## 2019-07-07 RX ADMIN — METHYLPREDNISOLONE SODIUM SUCCINATE 80 MG: 125 INJECTION, POWDER, FOR SOLUTION INTRAMUSCULAR; INTRAVENOUS at 02:51

## 2019-07-07 RX ADMIN — IPRATROPIUM BROMIDE AND ALBUTEROL SULFATE 3 ML: 2.5; .5 SOLUTION RESPIRATORY (INHALATION) at 14:37

## 2019-07-07 RX ADMIN — BUDESONIDE 0.5 MG: 0.5 INHALANT RESPIRATORY (INHALATION) at 14:37

## 2019-07-07 RX ADMIN — INSULIN LISPRO 4 UNITS: 100 INJECTION, SOLUTION INTRAVENOUS; SUBCUTANEOUS at 08:39

## 2019-07-07 RX ADMIN — GUAIFENESIN 1200 MG: 600 TABLET, EXTENDED RELEASE ORAL at 20:47

## 2019-07-07 RX ADMIN — INSULIN LISPRO 8 UNITS: 100 INJECTION, SOLUTION INTRAVENOUS; SUBCUTANEOUS at 20:51

## 2019-07-07 RX ADMIN — CEFTRIAXONE SODIUM 1 G: 1 INJECTION, POWDER, FOR SOLUTION INTRAMUSCULAR; INTRAVENOUS at 12:44

## 2019-07-07 RX ADMIN — METFORMIN HYDROCHLORIDE 500 MG: 500 TABLET ORAL at 08:14

## 2019-07-07 RX ADMIN — DESMOPRESSIN ACETATE 80 MG: 0.2 TABLET ORAL at 20:47

## 2019-07-07 RX ADMIN — CLOPIDOGREL 75 MG: 75 TABLET, FILM COATED ORAL at 17:09

## 2019-07-07 RX ADMIN — IPRATROPIUM BROMIDE AND ALBUTEROL SULFATE 3 ML: 2.5; .5 SOLUTION RESPIRATORY (INHALATION) at 06:40

## 2019-07-07 RX ADMIN — IPRATROPIUM BROMIDE AND ALBUTEROL SULFATE 3 ML: 2.5; .5 SOLUTION RESPIRATORY (INHALATION) at 23:00

## 2019-07-07 RX ADMIN — METFORMIN HYDROCHLORIDE 500 MG: 500 TABLET ORAL at 17:09

## 2019-07-07 RX ADMIN — IPRATROPIUM BROMIDE AND ALBUTEROL SULFATE 3 ML: 2.5; .5 SOLUTION RESPIRATORY (INHALATION) at 11:00

## 2019-07-07 RX ADMIN — HYDROXYZINE HYDROCHLORIDE 25 MG: 25 TABLET ORAL at 23:31

## 2019-07-07 RX ADMIN — METHYLPREDNISOLONE SODIUM SUCCINATE 80 MG: 125 INJECTION, POWDER, FOR SOLUTION INTRAMUSCULAR; INTRAVENOUS at 11:01

## 2019-07-07 RX ADMIN — PANTOPRAZOLE SODIUM 40 MG: 40 TABLET, DELAYED RELEASE ORAL at 08:39

## 2019-07-07 RX ADMIN — FLUTICASONE PROPIONATE 2 SPRAY: 50 SPRAY, METERED NASAL at 08:38

## 2019-07-07 RX ADMIN — GUAIFENESIN 1200 MG: 600 TABLET, EXTENDED RELEASE ORAL at 08:39

## 2019-07-07 RX ADMIN — INSULIN LISPRO 5 UNITS: 100 INJECTION, SOLUTION INTRAVENOUS; SUBCUTANEOUS at 11:53

## 2019-07-07 RX ADMIN — METHYLPREDNISOLONE SODIUM SUCCINATE 80 MG: 125 INJECTION, POWDER, FOR SOLUTION INTRAMUSCULAR; INTRAVENOUS at 17:09

## 2019-07-07 NOTE — PLAN OF CARE
"Problem: Patient Care Overview  Goal: Plan of Care Review  Outcome: Ongoing (interventions implemented as appropriate)   07/07/19 0542   Coping/Psychosocial   Plan of Care Reviewed With patient   Plan of Care Review   Progress no change   OTHER   Outcome Summary Patient on 4 liters bnc. O2 sat remains greater than 90 aside when coughing. Patient stated that can't \"cough anything up yet\". Patient also states that this is normal for her. Up to bsc several times during shift. Shortness of breath noted with activity.        Problem: Fall Risk (Adult)  Goal: Identify Related Risk Factors and Signs and Symptoms  Outcome: Ongoing (interventions implemented as appropriate)   07/07/19 0542   Fall Risk (Adult)   Related Risk Factors (Fall Risk) age-related changes;bladder function altered;fatigue/slow reaction;fear of falling;gait/mobility problems;history of falls         "

## 2019-07-07 NOTE — PAYOR COMM NOTE
"ADMIT INPT CCU 7-6-19  UR PHONE    [phone]    Landon Gregg (73 y.o. Female)     Date of Birth Social Security Number Address Home Phone MRN    1945  [address] [facility] [address] [phone] [id number]    Muslim Marital Status          Other        Admission Date Admission Type Admitting Provider Attending Provider Department, Room/Bed    7/6/19 Emergency Aubrey Parker MD Moore, Jonathan Scott, MD [facility] CARDIAC CARE, C011/1    Discharge Date Discharge Disposition Discharge Destination                       Attending Provider:  Aubrey Parker MD    Allergies:  Latex    Isolation:  None   Infection:  None   Code Status:  CPR    Ht:  152.4 cm (60\")   Wt:  53.8 kg (118 lb 11.2 oz)    Admission Cmt:  None   Principal Problem:  None                Active Insurance as of 7/6/2019     Primary Coverage     Payor Plan Insurance Group Employer/Plan Group    HUMANA MEDICARE REPLACEMENT HUMANA MEDICARE REPL [id number]     Payor Plan Address Payor Plan Phone Number Payor Plan Fax Number Effective Dates    [address] [address] [phone]  10/1/2018 - None Entered    [facility] [address]       Subscriber Name Subscriber Birth Date Member ID       LANDON GREGG 1945 [id number]                 Emergency Contacts      (Rel.) Home Phone Work Phone Mobile Phone    Mariaa Pitts (Sister) -- -- [phone]               History & Physical      Aubrey Parker MD at 7/6/2019  3:01 PM              [facility] Medicine Services  HISTORY AND PHYSICAL    Date of Admission: 7/6/2019  Primary Care Physician: Provider, No Known    Subjective     Chief Complaint: SOA    History of Present Illness  Ms. Gregg is a pleasant 73 year old lady with a history of tobacco abuse and COPD.  She came to [place] to visit her sister.  She lives in [place] about 6 hours from here.  Today she began to feel very SOA.  " She is coughing and wheezing.  She denies fevers or chills.  She denies leg swelling or weight gain.  She denies hemoptysis or leg swelling.  She denies recent surgery or immobilization.        Review of Systems   Constitutional: Negative for fatigue and fever.   HENT: Negative for congestion and ear pain.    Eyes: Negative for pain and visual disturbance.   Respiratory: Positive for cough, shortness of breath and wheezing.    Cardiovascular: Negative for chest pain and palpitations.   Gastrointestinal: Negative for diarrhea, nausea and vomiting.   Endocrine: Negative for heat intolerance.   Genitourinary: Negative for dysuria and frequency.   Musculoskeletal: Negative for arthralgias and back pain.   Skin: Negative for rash and wound.   Neurological: Negative for dizziness and light-headedness.   Psychiatric/Behavioral: Negative for confusion. The patient is not nervous/anxious.    All other systems reviewed and are negative.       Otherwise complete ROS reviewed and negative except as mentioned in the HPI.    Past Medical History:   Past Medical History:   Diagnosis Date   • CHF (congestive heart failure) (CMS/HCC)    • COPD (chronic obstructive pulmonary disease) (CMS/HCC)    • Diabetes mellitus (CMS/HCC)    • Emphysema lung (CMS/HCC)    • Mediastinal lymphadenopathy    • Stroke (CMS/HCC)      Past Surgical History:  Past Surgical History:   Procedure Laterality Date   • CAROTID STENT     • CHOLECYSTECTOMY     • COLONOSCOPY     • ESOPHAGUS SURGERY     • SIGMOIDOSCOPY       Social History:  reports that she has quit smoking. She does not have any smokeless tobacco history on file. She reports that she drinks alcohol. She reports that she does not use drugs.    Family History: HTN    Allergies:  Allergies   Allergen Reactions   • Latex Rash     Medications:  Prior to Admission medications    Medication Sig Start Date End Date Taking? Authorizing Provider   arformoterol (BROVANA) 15 MCG/2ML nebulizer solution Take   "by nebulization 2 (Two) Times a Day.   Yes Angelica Olson MD   atorvastatin (LIPITOR) 80 MG tablet Take 80 mg by mouth Daily.   Yes Angelica Olson MD   budesonide (PULMICORT) 0.5 MG/2ML nebulizer solution Take 0.5 mg by nebulization Daily.   Yes Angelica Olson MD   clopidogrel (PLAVIX) 75 MG tablet Take 75 mg by mouth Daily.   Yes Angelica Olson MD   diltiaZEM CD (CARDIZEM CD) 180 MG 24 hr capsule Take 180 mg by mouth Daily.   Yes Angelica Olson MD   docusate sodium (COLACE) 100 MG capsule Take 100 mg by mouth 2 (Two) Times a Day.   Yes Angelica Olson MD   escitalopram (LEXAPRO) 10 MG tablet Take 10 mg by mouth Daily.   Yes Angelica Olson MD   fluticasone (FLONASE) 50 MCG/ACT nasal spray 2 sprays into the nostril(s) as directed by provider Daily.   Yes Angelica Olson MD   insulin lispro (humaLOG) 100 UNIT/ML injection Inject 200 Units under the skin into the appropriate area as directed 3 (Three) Times a Day Before Meals.   Yes Angelica Olson MD   ipratropium-albuterol (DUO-NEB) 0.5-2.5 mg/3 ml nebulizer Take 3 mL by nebulization Every 4 (Four) Hours As Needed for Wheezing.   Yes Angelica Oslon MD   loratadine (CLARITIN) 10 MG tablet Take 10 mg by mouth Daily.   Yes Angelica Olson MD   metFORMIN (GLUCOPHAGE) 500 MG tablet Take 500 mg by mouth 2 (Two) Times a Day With Meals.   Yes Angelica Olson MD   montelukast (SINGULAIR) 10 MG tablet Take 10 mg by mouth Every Night.   Yes Angelica Olson MD   pantoprazole (PROTONIX) 40 MG EC tablet Take 40 mg by mouth Daily.   Yes Angelica Olson MD   tiotropium (SPIRIVA) 18 MCG per inhalation capsule Place 1 capsule into inhaler and inhale Daily.   Yes Angelica Olson MD     Objective     Vital Signs: BP (!) 148/135   Pulse 118   Temp 97.8 °F (36.6 °C) (Oral)   Resp 22   Ht 152.4 cm (60\")   Wt 53.9 kg (118 lb 14.4 oz)   SpO2 (!) 87%   BMI 23.22 kg/m²    Physical Exam "   Constitutional: She is oriented to person, place, and time. She appears well-developed and well-nourished.   HENT:   Head: Normocephalic and atraumatic.   Right Ear: External ear normal.   Left Ear: External ear normal.   Nose: Nose normal.   Mouth/Throat: Oropharynx is clear and moist.   Eyes: Conjunctivae and EOM are normal.   Neck: Normal range of motion. Neck supple.   Cardiovascular: Normal rate, regular rhythm and normal heart sounds.   Pulmonary/Chest: Accessory muscle usage present. Tachypnea noted. She is in respiratory distress. She has decreased breath sounds. She has wheezes.   Abdominal: Soft. Bowel sounds are normal. She exhibits no distension. There is no tenderness.   Musculoskeletal: Normal range of motion.   Neurological: She is alert and oriented to person, place, and time.   Skin: Skin is warm and dry.   Psychiatric: She has a normal mood and affect. Her speech is normal and behavior is normal. Cognition and memory are normal.         Results Reviewed:  Lab Results (last 24 hours)     Procedure Component Value Units Date/Time    Blood Gas, Arterial [[id number]]  (Abnormal) Collected:  07/06/19 1310    Specimen:  Arterial Blood Updated:  07/06/19 1316     Site Right Radial     Aneesh's Test Positive     pH, Arterial 7.352 pH units      pCO2, Arterial 62.3 mm Hg      Comment: 83 Value above reference range        pO2, Arterial 279.0 mm Hg      Comment: 83 Value above reference range        HCO3, Arterial 34.5 mmol/L      Comment: 83 Value above reference range        Base Excess, Arterial 7.1 mmol/L      Comment: 83 Value above reference range        O2 Saturation, Arterial 99.7 %      Comment: 83 Value above reference range        Temperature 37.0 C      Barometric Pressure for Blood Gas 750 mmHg      Modality NRB     FIO2 100 %      Flow Rate 12.0 lpm      Ventilator Mode NA     Collected by [id number]     Comment: Meter: [id number]     :  [id number]       [place] Draw [[id number]]  Collected:  07/06/19 1004    Specimen:  Blood Updated:  07/06/19 1145    Narrative:       The following orders were created for panel order [place] Draw.  Procedure                               Abnormality         Status                     ---------                               -----------         ------                     Light Blue Top[[id number]]                                   Final result               Green Top (Gel)[[id number]]                                  Final result               Lavender Top[[id number]]                                     Final result               Red Top[[id number]]                                          Final result               [place] Blood Culture Cornelius...[[id number]]                      Final result                 Please view results for these tests on the individual orders.    Green Top (Gel) [[id number]] Collected:  07/06/19 1004    Specimen:  Blood from Arm, Right Updated:  07/06/19 1145     Extra Tube Hold for add-ons.     Comment: Auto resulted.       Light Blue Top [[id number]] Collected:  07/06/19 1004    Specimen:  Blood from Arm, Right Updated:  07/06/19 1116     Extra Tube hold for add-on     Comment: Auto resulted       Lavender Top [[id number]] Collected:  07/06/19 1004    Specimen:  Blood from Arm, Right Updated:  07/06/19 1116     Extra Tube hold for add-on     Comment: Auto resulted       Red Top [[id number]] Collected:  07/06/19 1004    Specimen:  Blood from Arm, Right Updated:  07/06/19 1116     Extra Tube Hold for add-ons.     Comment: Auto resulted.       [place] Blood Culture Bottle Set [[id number]] Collected:  07/06/19 1004    Specimen:  Blood from Arm, Right Updated:  07/06/19 1116     Extra Tube Hold for add-ons.     Comment: Auto resulted.       Lactic Acid, Plasma [[id number]]  (Normal) Collected:  07/06/19 1004    Specimen:  Blood from Arm, Right Updated:  07/06/19 1049     Lactate 1.3 mmol/L     Comprehensive Metabolic Panel [[id number]]  (Abnormal)  Collected:  07/06/19 1004    Specimen:  Blood from Arm, Right Updated:  07/06/19 1048     Glucose 169 mg/dL      BUN 24 mg/dL      Creatinine 0.57 mg/dL      Sodium 141 mmol/L      Potassium 4.4 mmol/L      Chloride 97 mmol/L      CO2 38.0 mmol/L      Calcium 9.2 mg/dL      Total Protein 7.3 g/dL      Albumin 4.20 g/dL      ALT (SGPT) 21 U/L      AST (SGOT) 36 U/L      Alkaline Phosphatase 108 U/L      Total Bilirubin 0.3 mg/dL      eGFR Non African Amer 104 mL/min/1.73      Globulin 3.1 gm/dL      A/G Ratio 1.4 g/dL      BUN/Creatinine Ratio 42.1     Anion Gap 6.0 mmol/L     Narrative:       GFR Normal >60  Chronic Kidney Disease <60  Kidney Failure <15    Blood Culture - Blood, Arm, Right [[id number]] Collected:  07/06/19 1004    Specimen:  Blood from Arm, Right Updated:  07/06/19 1035    CBC & Differential [[id number]] Collected:  07/06/19 1004    Specimen:  Blood Updated:  07/06/19 1034    Narrative:       The following orders were created for panel order CBC & Differential.  Procedure                               Abnormality         Status                     ---------                               -----------         ------                     CBC Auto Differential[[id number]]        Abnormal            Final result                 Please view results for these tests on the individual orders.    CBC Auto Differential [[id number]]  (Abnormal) Collected:  07/06/19 1004    Specimen:  Blood from Arm, Right Updated:  07/06/19 1034     WBC 11.88 10*3/mm3      RBC 4.19 10*6/mm3      Hemoglobin 11.7 g/dL      Hematocrit 38.6 %      MCV 92.1 fL      MCH 27.9 pg      MCHC 30.3 g/dL      RDW 14.8 %      RDW-SD 49.7 fl      MPV 10.2 fL      Platelets 242 10*3/mm3      Neutrophil % 72.6 %      Lymphocyte % 12.9 %      Monocyte % 6.1 %      Eosinophil % 7.7 %      Basophil % 0.4 %      Immature Grans % 0.3 %      Neutrophils, Absolute 8.62 10*3/mm3      Lymphocytes, Absolute 1.53 10*3/mm3      Monocytes, Absolute 0.73 10*3/mm3       Eosinophils, Absolute 0.92 10*3/mm3      Basophils, Absolute 0.05 10*3/mm3      Immature Grans, Absolute 0.03 10*3/mm3      nRBC 0.0 /100 WBC     Blood Culture - Blood, Arm, Left [[id number]] Collected:  07/06/19 1005    Specimen:  Blood from Arm, Left Updated:  07/06/19 1034        Imaging Results (last 24 hours)     Procedure Component Value Units Date/Time    XR Chest 1 View [[id number]] Collected:  07/06/19 1057     Updated:  07/06/19 1101    Narrative:       EXAMINATION: XR CHEST 1 VW-. 7/6/2019 10:57 AM CDT     CHEST, ONE VIEW:     HISTORY: Cough and shortness of breath     COMPARISON: None     A single frontal chest radiograph was obtained.     FINDINGS:     COPD and probable chronic lung changes identified.     There are no parenchymal infiltrates.     The heart is normal in size, pulmonary circulation appropriate, without  heart failure.     Aortic arch calcifications noted.     Surgical changes noted in the left neck.     No acute osseous abnormalities appreciated.                                     Impression:       1. Probable chronic lung changes.  2. No definite acute cardiopulmonary process.     This report was finalized on 07/06/2019 10:58 by Dr. Patric Benson MD.        I have personally reviewed and interpreted the radiology studies and ECG obtained at time of admission.     Assessment / Plan     Assessment:   Active Hospital Problems    Diagnosis   • Chronic obstructive pulmonary disease with acute exacerbation (CMS/HCC)       1.  Acute Hypoxic Respiratory Failure  - nebs  -IV abx  -IV steroids  -Mucinex  -Flutter    2.  COPD AE  - nebs  -IV abx  -IV steroids  -Mucinex  -Flutter    3.  T2DM  -SSI    4.  Tobacco abuse  -previously quit    Fairly low degree of suspicion for Pulmonary embolism at this point.  Given her degree of wheezing, this is most likely COPD exacerbation.  Wells Score for PE is only 1.5, making her low risk.  Legs are symmetrical without swelling or edema           Code  Status: full     I discussed the patient's findings and my recommendations with the patient, patient's sister    Estimated length of stay 3-4 days    Aubrey Parker MD   07/06/19   3:01 PM              Electronically signed by Aubrey Parker MD at 7/6/2019  4:15 PM          Emergency Department Notes      Hugo Farooq Jr., MD at 7/6/2019 10:39 AM          Subjective   Patient complains of cough and shortness of breath that is actually been going for a couple days but woke up much worse this morning.  She could not catch her breath at all this morning despite her oxygen.  She called 911 and the paramedics arrived where she was giving herself one breathing treatment and they gave another.  She is apparently better right now though not clear.  Her cough is been nonproductive.  She has not had any fever or chills.  She is normally on 4 L of oxygen at all time but did raise it to 5 last night.        History provided by:  Patient   used: No    Shortness of Breath   Severity:  Severe  Onset quality:  Gradual  Duration:  2 days  Timing:  Constant  Progression:  Worsening  Chronicity:  Recurrent  Context: not activity, not animal exposure, not emotional upset, not fumes, not known allergens, not occupational exposure, not pollens, not smoke exposure, not strong odors, not URI and not weather changes    Relieved by:  Nothing  Worsened by:  Nothing  Ineffective treatments:  None tried  Associated symptoms: cough and wheezing    Associated symptoms: no abdominal pain, no chest pain, no claudication, no diaphoresis, no ear pain, no fever, no headaches, no neck pain, no rash, no sore throat, no sputum production, no swollen glands and no vomiting    Risk factors: no recent alcohol use, no family hx of DVT, no hx of PE/DVT, no obesity, no prolonged immobilization, no recent surgery and no tobacco use        Review of Systems   Constitutional: Negative.  Negative for diaphoresis and fever.   HENT:  Negative.  Negative for ear pain and sore throat.    Respiratory: Positive for cough, shortness of breath and wheezing. Negative for sputum production.    Cardiovascular: Negative.  Negative for chest pain and claudication.   Gastrointestinal: Negative.  Negative for abdominal pain and vomiting.   Genitourinary: Negative.    Musculoskeletal: Negative.  Negative for neck pain.   Skin: Negative.  Negative for rash.   Neurological: Negative.  Negative for headaches.   Psychiatric/Behavioral: Negative.    All other systems reviewed and are negative.      Past Medical History:   Diagnosis Date   • CHF (congestive heart failure) (CMS/[facility])    • COPD (chronic obstructive pulmonary disease) (CMS/[facility])    • Diabetes mellitus (CMS/[facility])    • Emphysema lung (CMS/[facility])    • Mediastinal lymphadenopathy    • Stroke (CMS/[facility])        No Known Allergies    Past Surgical History:   Procedure Laterality Date   • CAROTID STENT     • CHOLECYSTECTOMY     • COLONOSCOPY     • ESOPHAGUS SURGERY     • SIGMOIDOSCOPY         History reviewed. No pertinent family history.    Social History     Socioeconomic History   • Marital status:      Spouse name: Not on file   • Number of children: Not on file   • Years of education: Not on file   • Highest education level: Not on file   Tobacco Use   • Smoking status: Former Smoker   Substance and Sexual Activity   • Alcohol use: Yes     Comment: OCCASSIONAL   • Drug use: No   • Sexual activity: Defer       Prior to Admission medications    Medication Sig Start Date End Date Taking? Authorizing Provider   arformoterol (BROVANA) 15 MCG/2ML nebulizer solution Take  by nebulization 2 (Two) Times a Day.   Yes Angelica Olson MD   atorvastatin (LIPITOR) 80 MG tablet Take 80 mg by mouth Daily.   Yes Angelica Olson MD   budesonide (PULMICORT) 0.5 MG/2ML nebulizer solution Take 0.5 mg by nebulization Daily.   Yes Angelica Olson MD   clopidogrel (PLAVIX) 75 MG tablet Take 75 mg by mouth  Daily.   Yes Angelica Olson MD   diltiaZEM CD (CARDIZEM CD) 180 MG 24 hr capsule Take 180 mg by mouth Daily.   Yes Angelica Olson MD   docusate sodium (COLACE) 100 MG capsule Take 100 mg by mouth 2 (Two) Times a Day.   Yes Angelica Olson MD   escitalopram (LEXAPRO) 10 MG tablet Take 10 mg by mouth Daily.   Yes Aneglica Olson MD   fluticasone (FLONASE) 50 MCG/ACT nasal spray 2 sprays into the nostril(s) as directed by provider Daily.   Yes Angelica Olson MD   insulin lispro (humaLOG) 100 UNIT/ML injection Inject 200 Units under the skin into the appropriate area as directed 3 (Three) Times a Day Before Meals.   Yes Angelica Olson MD   ipratropium-albuterol (DUO-NEB) 0.5-2.5 mg/3 ml nebulizer Take 3 mL by nebulization Every 4 (Four) Hours As Needed for Wheezing.   Yes Angelica Olson MD   loratadine (CLARITIN) 10 MG tablet Take 10 mg by mouth Daily.   Yes Angelica Olson MD   metFORMIN (GLUCOPHAGE) 500 MG tablet Take 500 mg by mouth 2 (Two) Times a Day With Meals.   Yes Angelica Olson MD   montelukast (SINGULAIR) 10 MG tablet Take 10 mg by mouth Every Night.   Yes Angelica Olson MD   pantoprazole (PROTONIX) 40 MG EC tablet Take 40 mg by mouth Daily.   Yes Angelica Olson MD   tiotropium (SPIRIVA) 18 MCG per inhalation capsule Place 1 capsule into inhaler and inhale Daily.   Yes Angelica Olson MD       Medications   sodium chloride 0.9 % flush 10 mL (not administered)   methylPREDNISolone sodium succinate (SOLU-Medrol) injection 125 mg (125 mg Intravenous Given 7/6/19 1028)   ipratropium-albuterol (DUO-NEB) nebulizer solution 3 mL (3 mL Nebulization Given 7/6/19 1219)       Vitals:    07/06/19 1213   BP:    Pulse:    Resp:    Temp:    SpO2: 91%         Objective   Physical Exam   Constitutional: She is oriented to person, place, and time. She appears well-developed and well-nourished.   HENT:   Head: Normocephalic and atraumatic.   Neck:  Normal range of motion. Neck supple.   Cardiovascular: Regular rhythm.   Patient is mildly tachycardic.   Pulmonary/Chest: Tachypnea noted. She is in respiratory distress. She has wheezes in the right middle field and the left middle field.   Patient is tachypneic and mildly labored.  She does have some retractions.  She has diffuse wheezes bilaterally but this mostly with expiration.   Abdominal: Soft. Bowel sounds are normal.   Musculoskeletal: Normal range of motion.        Right lower leg: Normal.        Left lower leg: Normal.   Neurological: She is alert and oriented to person, place, and time.   Skin: Skin is warm and dry.   Psychiatric: She has a normal mood and affect. Her behavior is normal.   Nursing note and vitals reviewed.      Procedures        Lab Results (last 24 hours)     Procedure Component Value Units Date/Time    [place] Blood Culture Bottle Set [[id number]] Collected:  07/06/19 1004    Specimen:  Blood from Arm, Right Updated:  07/06/19 1116     Extra Tube Hold for add-ons.     Comment: Auto resulted.       CBC & Differential [[id number]] Collected:  07/06/19 1004    Specimen:  Blood Updated:  07/06/19 1034    Narrative:       The following orders were created for panel order CBC & Differential.  Procedure                               Abnormality         Status                     ---------                               -----------         ------                     CBC Auto Differential[[id number]]        Abnormal            Final result                 Please view results for these tests on the individual orders.    Comprehensive Metabolic Panel [[id number]]  (Abnormal) Collected:  07/06/19 1004    Specimen:  Blood from Arm, Right Updated:  07/06/19 1048     Glucose 169 mg/dL      BUN 24 mg/dL      Creatinine 0.57 mg/dL      Sodium 141 mmol/L      Potassium 4.4 mmol/L      Chloride 97 mmol/L      CO2 38.0 mmol/L      Calcium 9.2 mg/dL      Total Protein 7.3 g/dL      Albumin 4.20 g/dL      ALT  (SGPT) 21 U/L      AST (SGOT) 36 U/L      Alkaline Phosphatase 108 U/L      Total Bilirubin 0.3 mg/dL      eGFR Non African Amer 104 mL/min/1.73      Globulin 3.1 gm/dL      A/G Ratio 1.4 g/dL      BUN/Creatinine Ratio 42.1     Anion Gap 6.0 mmol/L     Narrative:       GFR Normal >60  Chronic Kidney Disease <60  Kidney Failure <15    Blood Culture - Blood, Arm, Right [[id number]] Collected:  07/06/19 1004    Specimen:  Blood from Arm, Right Updated:  07/06/19 1035    Lactic Acid, Plasma [[id number]]  (Normal) Collected:  07/06/19 1004    Specimen:  Blood from Arm, Right Updated:  07/06/19 1049     Lactate 1.3 mmol/L     CBC Auto Differential [[id number]]  (Abnormal) Collected:  07/06/19 1004    Specimen:  Blood from Arm, Right Updated:  07/06/19 1034     WBC 11.88 10*3/mm3      RBC 4.19 10*6/mm3      Hemoglobin 11.7 g/dL      Hematocrit 38.6 %      MCV 92.1 fL      MCH 27.9 pg      MCHC 30.3 g/dL      RDW 14.8 %      RDW-SD 49.7 fl      MPV 10.2 fL      Platelets 242 10*3/mm3      Neutrophil % 72.6 %      Lymphocyte % 12.9 %      Monocyte % 6.1 %      Eosinophil % 7.7 %      Basophil % 0.4 %      Immature Grans % 0.3 %      Neutrophils, Absolute 8.62 10*3/mm3      Lymphocytes, Absolute 1.53 10*3/mm3      Monocytes, Absolute 0.73 10*3/mm3      Eosinophils, Absolute 0.92 10*3/mm3      Basophils, Absolute 0.05 10*3/mm3      Immature Grans, Absolute 0.03 10*3/mm3      nRBC 0.0 /100 WBC     Blood Culture - Blood, Arm, Left [[id number]] Collected:  07/06/19 1005    Specimen:  Blood from Arm, Left Updated:  07/06/19 1034          XR Chest 1 View   Final Result   1. Probable chronic lung changes.   2. No definite acute cardiopulmonary process.       This report was finalized on 07/06/2019 10:58 by Dr. Patric Benson MD.          ED Course  ED Course as of Jul 06 1220   Sat Jul 06, 2019   1219 Patient's pulse ox does drop in the mid 80s when she talks or has any movement at all.  Her chest x-ray shows chronic changes but nothing  new.  She does not have any clinical signs of sepsis and her abnormal respiratory rate and tachycardia are due to her underlying illness of COPD.  However she will require admission for further stabilization.  [TR]      ED Course User Index  [TR] Hugo Farooq Jr., MD          MDM  Number of Diagnoses or Management Options  Chronic obstructive pulmonary disease with acute exacerbation (CMS/HCC): new and requires workup     Amount and/or Complexity of Data Reviewed  Clinical lab tests: ordered and reviewed  Tests in the radiology section of CPT®:  ordered and reviewed  Tests in the medicine section of CPT®:  ordered and reviewed  Discuss the patient with other providers: yes    Risk of Complications, Morbidity, and/or Mortality  Presenting problems: moderate  Diagnostic procedures: moderate  Management options: moderate    Patient Progress  Patient progress: stable      Final diagnoses:   Chronic obstructive pulmonary disease with acute exacerbation (CMS/HCC)          Hugo Farooq Jr., MD  07/06/19 1220      Electronically signed by Hugo Farooq Jr., MD at 7/6/2019 12:20 PM       Intake & Output (last day)       07/06 0701 - 07/07 0700 07/07 0701 - 07/08 0700    P.O. 750 450    IV Piggyback  50    Total Intake(mL/kg) 750 (13.9) 500 (9.3)    Urine (mL/kg/hr) 500     Total Output 500     Net +250 +500          Urine Unmeasured Occurrence 4 x         Lines, Drains & Airways    Active LDAs     Name:   Placement date:   Placement time:   Site:   Days:    Peripheral IV 07/06/19 1004 Right Antecubital   07/06/19    1004    Antecubital   1                Hospital Medications (all)       Dose Frequency Start End    atorvastatin (LIPITOR) tablet 80 mg 80 mg Nightly 7/6/2019     Sig - Route: Take 2 tablets by mouth Every Night. - Oral    budesonide (PULMICORT) nebulizer solution 0.5 mg 0.5 mg Daily - RT 7/6/2019     Sig - Route: Take 2 mL by nebulization Daily. - Nebulization    cefTRIAXone (ROCEPHIN) 1 g/100 mL  0.9% NS (MBP) 1 g Every 24 Hours 7/7/2019 7/14/2019    Sig - Route: Infuse 100 mL into a venous catheter Daily. - Intravenous    clopidogrel (PLAVIX) tablet 75 mg 75 mg Daily 7/6/2019     Sig - Route: Take 1 tablet by mouth Daily. - Oral    dextrose (D50W) 25 g/ 50mL Intravenous Solution 25 g 25 g Every 15 Minutes PRN 7/7/2019     Sig - Route: Infuse 50 mL into a venous catheter Every 15 (Fifteen) Minutes As Needed for Low Blood Sugar (Blood Sugar Less Than 70). - Intravenous    dextrose (GLUTOSE) oral gel 15 g 15 g Every 15 Minutes PRN 7/7/2019     Sig - Route: Take 15 application by mouth Every 15 (Fifteen) Minutes As Needed for Low Blood Sugar (Blood sugar less than 70). - Oral    diltiaZEM CD (CARDIZEM CD) 24 hr capsule 180 mg 180 mg Daily 7/6/2019     Sig - Route: Take 1 capsule by mouth Daily. - Oral    escitalopram (LEXAPRO) tablet 10 mg 10 mg Daily 7/6/2019     Sig - Route: Take 1 tablet by mouth Daily. - Oral    fluticasone (FLONASE) 50 MCG/ACT nasal spray 2 spray 2 spray Daily 7/6/2019     Sig - Route: 2 sprays into the nostril(s) as directed by provider Daily. - Nasal    glucagon (human recombinant) (GLUCAGEN DIAGNOSTIC) injection 1 mg 1 mg As Needed 7/7/2019     Sig - Route: Inject 1 mg under the skin into the appropriate area as directed As Needed for Low Blood Sugar (Blood Glucose Less Than 70). - Subcutaneous    guaiFENesin (MUCINEX) 12 hr tablet 1,200 mg 1,200 mg Every 12 Hours Scheduled 7/6/2019     Sig - Route: Take 2 tablets by mouth Every 12 (Twelve) Hours. - Oral    insulin lispro (humaLOG) injection 0-14 Units 0-14 Units 4 Times Daily With Meals & Nightly 7/7/2019     Sig - Route: Inject 0-14 Units under the skin into the appropriate area as directed 4 (Four) Times a Day With Meals & at Bedtime. - Subcutaneous    ipratropium-albuterol (DUO-NEB) nebulizer solution 3 mL 3 mL Once 7/6/2019 7/6/2019    Sig - Route: Take 3 mL by nebulization 1 (One) Time. - Nebulization    ipratropium-albuterol  "(DUO-NEB) nebulizer solution 3 mL 3 mL Every 4 Hours - RT 7/6/2019     Sig - Route: Take 3 mL by nebulization Every 4 (Four) Hours. - Nebulization    metFORMIN (GLUCOPHAGE) tablet 500 mg 500 mg 2 Times Daily With Meals 7/6/2019     Sig - Route: Take 1 tablet by mouth 2 (Two) Times a Day With Meals. - Oral    methylPREDNISolone sodium succinate (SOLU-Medrol) injection 125 mg 125 mg Once 7/6/2019 7/6/2019    Sig - Route: Infuse 2 mL into a venous catheter 1 (One) Time. - Intravenous    methylPREDNISolone sodium succinate (SOLU-Medrol) injection 80 mg 80 mg Every 8 Hours 7/6/2019     Sig - Route: Infuse 1.28 mL into a venous catheter Every 8 (Eight) Hours. - Intravenous    pantoprazole (PROTONIX) EC tablet 40 mg 40 mg Daily 7/6/2019     Sig - Route: Take 1 tablet by mouth Daily. - Oral    sodium chloride 0.9 % flush 10 mL 10 mL As Needed 7/6/2019     Sig - Route: Infuse 10 mL into a venous catheter As Needed for Line Care. - Intravenous    Linked Group 1:  \"And\" Linked Group Details        dextrose (D50W) 25 g/ 50mL Intravenous Solution 25 g (Discontinued) 25 g Every 15 Minutes PRN 7/6/2019 7/7/2019    Sig - Route: Infuse 50 mL into a venous catheter Every 15 (Fifteen) Minutes As Needed for Low Blood Sugar (Blood Sugar Less Than 70). - Intravenous    Reason for Discontinue: Duplicate order    dextrose (GLUTOSE) oral gel 15 g (Discontinued) 15 g Every 15 Minutes PRN 7/6/2019 7/7/2019    Sig - Route: Take 15 application by mouth Every 15 (Fifteen) Minutes As Needed for Low Blood Sugar (Blood sugar less than 70). - Oral    Reason for Discontinue: Duplicate order    glucagon (human recombinant) (GLUCAGEN DIAGNOSTIC) injection 1 mg (Discontinued) 1 mg As Needed 7/6/2019 7/7/2019    Sig - Route: Inject 1 mg under the skin into the appropriate area as directed As Needed for Low Blood Sugar (Blood Glucose Less Than 70). - Subcutaneous    Reason for Discontinue: Duplicate order    insulin lispro (humaLOG) injection 2-9 Units " (Discontinued) 2-9 Units 4 Times Daily With Meals & Nightly 7/6/2019 7/7/2019    Sig - Route: Inject 2-9 Units under the skin into the appropriate area as directed 4 (Four) Times a Day With Meals & at Bedtime. - Subcutaneous          Lab Results (last 48 hours)     Procedure Component Value Units Date/Time    POC Glucose Once [[id number]]  (Abnormal) Collected:  07/07/19 1612    Specimen:  Blood Updated:  07/07/19 1626     Glucose 282 mg/dL      Comment: : [id number] Maribell NancyMeter ID: [id number]       POC Glucose Once [[id number]]  (Abnormal) Collected:  07/07/19 1058    Specimen:  Blood Updated:  07/07/19 1110     Glucose 212 mg/dL      Comment: : [id number] Maribell NancyMeter ID: [id number]       Blood Culture - Blood, Arm, Left [[id number]] Collected:  07/06/19 1005    Specimen:  Blood from Arm, Left Updated:  07/07/19 1045     Blood Culture No growth at 24 hours    Blood Culture - Blood, Arm, Right [[id number]] Collected:  07/06/19 1004    Specimen:  Blood from Arm, Right Updated:  07/07/19 1045     Blood Culture No growth at 24 hours    POC Glucose Once [[id number]]  (Abnormal) Collected:  07/07/19 0311    Specimen:  Blood Updated:  07/07/19 0322     Glucose 217 mg/dL      Comment: : [email or web address] Albaro AmandaMeter ID: [id number]       Comprehensive Metabolic Panel [[id number]]  (Abnormal) Collected:  07/07/19 0242    Specimen:  Blood Updated:  07/07/19 0321     Glucose 208 mg/dL      BUN 21 mg/dL      Creatinine 0.43 mg/dL      Sodium 140 mmol/L      Potassium 4.0 mmol/L      Chloride 98 mmol/L      CO2 34.0 mmol/L      Calcium 9.4 mg/dL      Total Protein 6.8 g/dL      Albumin 4.00 g/dL      ALT (SGPT) 23 U/L      AST (SGOT) 25 U/L      Alkaline Phosphatase 101 U/L      Total Bilirubin 0.4 mg/dL      eGFR Non African Amer 144 mL/min/1.73      Globulin 2.8 gm/dL      A/G Ratio 1.4 g/dL      BUN/Creatinine Ratio 48.8     Anion Gap 8.0 mmol/L     Narrative:       GFR Normal >60  Chronic Kidney Disease  <60  Kidney Failure <15    CBC & Differential [[id number]] Collected:  07/07/19 0242    Specimen:  Blood Updated:  07/07/19 0309    Narrative:       The following orders were created for panel order CBC & Differential.  Procedure                               Abnormality         Status                     ---------                               -----------         ------                     CBC Auto Differential[[id number]]        Abnormal            Final result                 Please view results for these tests on the individual orders.    CBC Auto Differential [[id number]]  (Abnormal) Collected:  07/07/19 0242    Specimen:  Blood Updated:  07/07/19 0309     WBC 7.69 10*3/mm3      RBC 3.86 10*6/mm3      Hemoglobin 10.7 g/dL      Hematocrit 34.5 %      MCV 89.4 fL      MCH 27.7 pg      MCHC 31.0 g/dL      RDW 14.6 %      RDW-SD 46.8 fl      MPV 10.1 fL      Platelets 219 10*3/mm3      Neutrophil % 89.7 %      Lymphocyte % 9.1 %      Monocyte % 0.7 %      Eosinophil % 0.0 %      Basophil % 0.1 %      Immature Grans % 0.4 %      Neutrophils, Absolute 6.90 10*3/mm3      Lymphocytes, Absolute 0.70 10*3/mm3      Monocytes, Absolute 0.05 10*3/mm3      Eosinophils, Absolute 0.00 10*3/mm3      Basophils, Absolute 0.01 10*3/mm3      Immature Grans, Absolute 0.03 10*3/mm3      nRBC 0.0 /100 WBC     POC Glucose Once [[id number]]  (Abnormal) Collected:  07/06/19 2014    Specimen:  Blood Updated:  07/06/19 2025     Glucose 262 mg/dL      Comment: : [email or web address] Albaro AmandaMeter ID: [id number]       POC Glucose Once [[id number]]  (Abnormal) Collected:  07/06/19 1606    Specimen:  Blood Updated:  07/06/19 1617     Glucose 230 mg/dL      Comment: : [id number] Maribell YeboahcyMeter ID: [id number]       Blood Gas, Arterial [[id number]]  (Abnormal) Collected:  07/06/19 1310    Specimen:  Arterial Blood Updated:  07/06/19 1316     Site Right Radial     Aneesh's Test Positive     pH, Arterial 7.352 pH units      pCO2, Arterial 62.3 mm Hg       Comment: 83 Value above reference range        pO2, Arterial 279.0 mm Hg      Comment: 83 Value above reference range        HCO3, Arterial 34.5 mmol/L      Comment: 83 Value above reference range        Base Excess, Arterial 7.1 mmol/L      Comment: 83 Value above reference range        O2 Saturation, Arterial 99.7 %      Comment: 83 Value above reference range        Temperature 37.0 C      Barometric Pressure for Blood Gas 750 mmHg      Modality NRB     FIO2 100 %      Flow Rate 12.0 lpm      Ventilator Mode NA     Collected by [id number]     Comment: Meter: [id number]     :  [id number]       [facility] Draw [[id number]] Collected:  07/06/19 1004    Specimen:  Blood Updated:  07/06/19 1145    Narrative:       The following orders were created for panel order [facility] Draw.  Procedure                               Abnormality         Status                     ---------                               -----------         ------                     Light Blue Top[[id number]]                                   Final result               Green Top (Gel)[[id number]]                                  Final result               Lavender Top[[id number]]                                     Final result               Red Top[[id number]]                                          Final result               [facility] Blood Culture Cornelius...[[id number]]                      Final result                 Please view results for these tests on the individual orders.    Green Top (Gel) [[id number]] Collected:  07/06/19 1004    Specimen:  Blood from Arm, Right Updated:  07/06/19 1145     Extra Tube Hold for add-ons.     Comment: Auto resulted.       Light Blue Top [[id number]] Collected:  07/06/19 1004    Specimen:  Blood from Arm, Right Updated:  07/06/19 1116     Extra Tube hold for add-on     Comment: Auto resulted       Lavender Top [[id number]] Collected:  07/06/19 1004    Specimen:  Blood from Arm, Right Updated:  07/06/19 1116      Extra Tube hold for add-on     Comment: Auto resulted       Red Top [[id number]] Collected:  07/06/19 1004    Specimen:  Blood from Arm, Right Updated:  07/06/19 1116     Extra Tube Hold for add-ons.     Comment: Auto resulted.       [facility] Blood Culture Bottle Set [[id number]] Collected:  07/06/19 1004    Specimen:  Blood from Arm, Right Updated:  07/06/19 1116     Extra Tube Hold for add-ons.     Comment: Auto resulted.       Lactic Acid, Plasma [[id number]]  (Normal) Collected:  07/06/19 1004    Specimen:  Blood from Arm, Right Updated:  07/06/19 1049     Lactate 1.3 mmol/L     Comprehensive Metabolic Panel [[id number]]  (Abnormal) Collected:  07/06/19 1004    Specimen:  Blood from Arm, Right Updated:  07/06/19 1048     Glucose 169 mg/dL      BUN 24 mg/dL      Creatinine 0.57 mg/dL      Sodium 141 mmol/L      Potassium 4.4 mmol/L      Chloride 97 mmol/L      CO2 38.0 mmol/L      Calcium 9.2 mg/dL      Total Protein 7.3 g/dL      Albumin 4.20 g/dL      ALT (SGPT) 21 U/L      AST (SGOT) 36 U/L      Alkaline Phosphatase 108 U/L      Total Bilirubin 0.3 mg/dL      eGFR Non African Amer 104 mL/min/1.73      Globulin 3.1 gm/dL      A/G Ratio 1.4 g/dL      BUN/Creatinine Ratio 42.1     Anion Gap 6.0 mmol/L     Narrative:       GFR Normal >60  Chronic Kidney Disease <60  Kidney Failure <15    CBC & Differential [[id number]] Collected:  07/06/19 1004    Specimen:  Blood Updated:  07/06/19 1034    Narrative:       The following orders were created for panel order CBC & Differential.  Procedure                               Abnormality         Status                     ---------                               -----------         ------                     CBC Auto Differential[[id number]]        Abnormal            Final result                 Please view results for these tests on the individual orders.    CBC Auto Differential [[id number]]  (Abnormal) Collected:  07/06/19 1004    Specimen:  Blood from Arm, Right Updated:   07/06/19 1034     WBC 11.88 10*3/mm3      RBC 4.19 10*6/mm3      Hemoglobin 11.7 g/dL      Hematocrit 38.6 %      MCV 92.1 fL      MCH 27.9 pg      MCHC 30.3 g/dL      RDW 14.8 %      RDW-SD 49.7 fl      MPV 10.2 fL      Platelets 242 10*3/mm3      Neutrophil % 72.6 %      Lymphocyte % 12.9 %      Monocyte % 6.1 %      Eosinophil % 7.7 %      Basophil % 0.4 %      Immature Grans % 0.3 %      Neutrophils, Absolute 8.62 10*3/mm3      Lymphocytes, Absolute 1.53 10*3/mm3      Monocytes, Absolute 0.73 10*3/mm3      Eosinophils, Absolute 0.92 10*3/mm3      Basophils, Absolute 0.05 10*3/mm3      Immature Grans, Absolute 0.03 10*3/mm3      nRBC 0.0 /100 WBC           Lab Results (last 48 hours)     Procedure Component Value Units Date/Time    POC Glucose Once [[id number]]  (Abnormal) Collected:  07/07/19 1612    Specimen:  Blood Updated:  07/07/19 1626     Glucose 282 mg/dL      Comment: : [id number] Maribell NancyMeter ID: [id number]       POC Glucose Once [[id number]]  (Abnormal) Collected:  07/07/19 1058    Specimen:  Blood Updated:  07/07/19 1110     Glucose 212 mg/dL      Comment: : [id number] Maribell NancyMeter ID: [id number]       Blood Culture - Blood, Arm, Left [[id number]] Collected:  07/06/19 1005    Specimen:  Blood from Arm, Left Updated:  07/07/19 1045     Blood Culture No growth at 24 hours    Blood Culture - Blood, Arm, Right [[id number]] Collected:  07/06/19 1004    Specimen:  Blood from Arm, Right Updated:  07/07/19 1045     Blood Culture No growth at 24 hours    POC Glucose Once [[id number]]  (Abnormal) Collected:  07/07/19 0311    Specimen:  Blood Updated:  07/07/19 0322     Glucose 217 mg/dL      Comment: : [email or web address] Albaro AmandaMeter ID: [id number]       Comprehensive Metabolic Panel [[id number]]  (Abnormal) Collected:  07/07/19 0242    Specimen:  Blood Updated:  07/07/19 0321     Glucose 208 mg/dL      BUN 21 mg/dL      Creatinine 0.43 mg/dL      Sodium 140 mmol/L      Potassium 4.0 mmol/L       Chloride 98 mmol/L      CO2 34.0 mmol/L      Calcium 9.4 mg/dL      Total Protein 6.8 g/dL      Albumin 4.00 g/dL      ALT (SGPT) 23 U/L      AST (SGOT) 25 U/L      Alkaline Phosphatase 101 U/L      Total Bilirubin 0.4 mg/dL      eGFR Non African Amer 144 mL/min/1.73      Globulin 2.8 gm/dL      A/G Ratio 1.4 g/dL      BUN/Creatinine Ratio 48.8     Anion Gap 8.0 mmol/L     Narrative:       GFR Normal >60  Chronic Kidney Disease <60  Kidney Failure <15    CBC & Differential [[id number]] Collected:  07/07/19 0242    Specimen:  Blood Updated:  07/07/19 0309    Narrative:       The following orders were created for panel order CBC & Differential.  Procedure                               Abnormality         Status                     ---------                               -----------         ------                     CBC Auto Differential[[id number]]        Abnormal            Final result                 Please view results for these tests on the individual orders.    CBC Auto Differential [[id number]]  (Abnormal) Collected:  07/07/19 0242    Specimen:  Blood Updated:  07/07/19 0309     WBC 7.69 10*3/mm3      RBC 3.86 10*6/mm3      Hemoglobin 10.7 g/dL      Hematocrit 34.5 %      MCV 89.4 fL      MCH 27.7 pg      MCHC 31.0 g/dL      RDW 14.6 %      RDW-SD 46.8 fl      MPV 10.1 fL      Platelets 219 10*3/mm3      Neutrophil % 89.7 %      Lymphocyte % 9.1 %      Monocyte % 0.7 %      Eosinophil % 0.0 %      Basophil % 0.1 %      Immature Grans % 0.4 %      Neutrophils, Absolute 6.90 10*3/mm3      Lymphocytes, Absolute 0.70 10*3/mm3      Monocytes, Absolute 0.05 10*3/mm3      Eosinophils, Absolute 0.00 10*3/mm3      Basophils, Absolute 0.01 10*3/mm3      Immature Grans, Absolute 0.03 10*3/mm3      nRBC 0.0 /100 WBC     POC Glucose Once [[id number]]  (Abnormal) Collected:  07/06/19 2014    Specimen:  Blood Updated:  07/06/19 2025     Glucose 262 mg/dL      Comment: : CHANDANA Irvin AmandaMeter ID: [id number]       POC  Glucose Once [[id number]]  (Abnormal) Collected:  07/06/19 1606    Specimen:  Blood Updated:  07/06/19 1617     Glucose 230 mg/dL      Comment: : [id number] Maribell Mcfarland ID: [id number]       Blood Gas, Arterial [[id number]]  (Abnormal) Collected:  07/06/19 1310    Specimen:  Arterial Blood Updated:  07/06/19 1316     Site Right Radial     Aneesh's Test Positive     pH, Arterial 7.352 pH units      pCO2, Arterial 62.3 mm Hg      Comment: 83 Value above reference range        pO2, Arterial 279.0 mm Hg      Comment: 83 Value above reference range        HCO3, Arterial 34.5 mmol/L      Comment: 83 Value above reference range        Base Excess, Arterial 7.1 mmol/L      Comment: 83 Value above reference range        O2 Saturation, Arterial 99.7 %      Comment: 83 Value above reference range        Temperature 37.0 C      Barometric Pressure for Blood Gas 750 mmHg      Modality NRB     FIO2 100 %      Flow Rate 12.0 lpm      Ventilator Mode NA     Collected by [id number]     Comment: Meter: [id number]     :  [id number]       [facility] Draw [[id number]] Collected:  07/06/19 1004    Specimen:  Blood Updated:  07/06/19 1145    Narrative:       The following orders were created for panel order [facility] Draw.  Procedure                               Abnormality         Status                     ---------                               -----------         ------                     Light Blue Top[[id number]]                                   Final result               Green Top (Gel)[[id number]]                                  Final result               Lavender Top[[id number]]                                     Final result               Red Top[[id number]]                                          Final result               [facility] Blood Culture Cornelius...[[id number]]                      Final result                 Please view results for these tests on the individual orders.    Green Top (Gel) [[id number]] Collected:   07/06/19 1004    Specimen:  Blood from Arm, Right Updated:  07/06/19 1145     Extra Tube Hold for add-ons.     Comment: Auto resulted.       Light Blue Top [[id number]] Collected:  07/06/19 1004    Specimen:  Blood from Arm, Right Updated:  07/06/19 1116     Extra Tube hold for add-on     Comment: Auto resulted       Lavender Top [[id number]] Collected:  07/06/19 1004    Specimen:  Blood from Arm, Right Updated:  07/06/19 1116     Extra Tube hold for add-on     Comment: Auto resulted       Red Top [[id number]] Collected:  07/06/19 1004    Specimen:  Blood from Arm, Right Updated:  07/06/19 1116     Extra Tube Hold for add-ons.     Comment: Auto resulted.       [facility] Blood Culture Bottle Set [[id number]] Collected:  07/06/19 1004    Specimen:  Blood from Arm, Right Updated:  07/06/19 1116     Extra Tube Hold for add-ons.     Comment: Auto resulted.       Lactic Acid, Plasma [[id number]]  (Normal) Collected:  07/06/19 1004    Specimen:  Blood from Arm, Right Updated:  07/06/19 1049     Lactate 1.3 mmol/L     Comprehensive Metabolic Panel [[id number]]  (Abnormal) Collected:  07/06/19 1004    Specimen:  Blood from Arm, Right Updated:  07/06/19 1048     Glucose 169 mg/dL      BUN 24 mg/dL      Creatinine 0.57 mg/dL      Sodium 141 mmol/L      Potassium 4.4 mmol/L      Chloride 97 mmol/L      CO2 38.0 mmol/L      Calcium 9.2 mg/dL      Total Protein 7.3 g/dL      Albumin 4.20 g/dL      ALT (SGPT) 21 U/L      AST (SGOT) 36 U/L      Alkaline Phosphatase 108 U/L      Total Bilirubin 0.3 mg/dL      eGFR Non African Amer 104 mL/min/1.73      Globulin 3.1 gm/dL      A/G Ratio 1.4 g/dL      BUN/Creatinine Ratio 42.1     Anion Gap 6.0 mmol/L     Narrative:       GFR Normal >60  Chronic Kidney Disease <60  Kidney Failure <15    CBC & Differential [[id number]] Collected:  07/06/19 1004    Specimen:  Blood Updated:  07/06/19 1034    Narrative:       The following orders were created for panel order CBC & Differential.  Procedure                                Abnormality         Status                     ---------                               -----------         ------                     CBC Auto Differential[[id number]]        Abnormal            Final result                 Please view results for these tests on the individual orders.    CBC Auto Differential [[id number]]  (Abnormal) Collected:  07/06/19 1004    Specimen:  Blood from Arm, Right Updated:  07/06/19 1034     WBC 11.88 10*3/mm3      RBC 4.19 10*6/mm3      Hemoglobin 11.7 g/dL      Hematocrit 38.6 %      MCV 92.1 fL      MCH 27.9 pg      MCHC 30.3 g/dL      RDW 14.8 %      RDW-SD 49.7 fl      MPV 10.2 fL      Platelets 242 10*3/mm3      Neutrophil % 72.6 %      Lymphocyte % 12.9 %      Monocyte % 6.1 %      Eosinophil % 7.7 %      Basophil % 0.4 %      Immature Grans % 0.3 %      Neutrophils, Absolute 8.62 10*3/mm3      Lymphocytes, Absolute 1.53 10*3/mm3      Monocytes, Absolute 0.73 10*3/mm3      Eosinophils, Absolute 0.92 10*3/mm3      Basophils, Absolute 0.05 10*3/mm3      Immature Grans, Absolute 0.03 10*3/mm3      nRBC 0.0 /100 WBC           Orders (last 48 hrs)     Start     Ordered    07/07/19 1710  Oxygen Therapy- High Flow Nasal Cannula; 6 LPM; Titrate for SPO2: 90% - 95%  Continuous     Comments:  [place] Hi Victor Hugo due to sats 85 on 6 lpm regular cannula    07/07/19 1710    07/07/19 1700  POC Glucose 4x Daily AC & at Bedtime  4 Times Daily Before Meals & at Bedtime      07/07/19 1107    07/07/19 1627  POC Glucose Once  Once      07/07/19 1612    07/07/19 1239  Inpatient Case Management  Consult  Once     Provider:  (Not yet assigned)    07/07/19 1533    07/07/19 1200  cefTRIAXone (ROCEPHIN) 1 g/100 mL 0.9% NS (MBP)  Every 24 Hours      07/07/19 1059    07/07/19 1200  insulin lispro (humaLOG) injection 0-14 Units  4 Times Daily With Meals & Nightly      07/07/19 1107    07/07/19 1148  NIPPV (CPAP or BIPAP)  At Bedtime & As Needed-RT      07/07/19 1147    07/07/19  1130  Chest Physiotherapy (PD & P)  Every 4 Hours - RT      07/07/19 1059    07/07/19 1111  POC Glucose Once  Once      07/07/19 1058    07/07/19 1108  Do NOT Hold Basal or Correction Scale Insulin When Patient is NPO, Hold Scheduled Mealtime (Bolus) Insulin if NPO  Continuous      07/07/19 1107    07/07/19 1108  Follow [facility] Hypoglycemia Standing Orders For Blood Glucose Less Than 70 mg/dL  Until Discontinued      07/07/19 1107    07/07/19 1108  Hypoglycemia Treatment - Alert Patient That is Not NPO and Can Safely Swallow  Until Discontinued     Comments:  Administer 4 oz Fruit Juice OR 4 oz Regular Soda OR 8 oz Milk OR 15-30 grams (1 tube) of Glucose Gel.  Recheck Blood Glucose 15 Minutes After Ingestion, Repeat Treatment & Continue to Recheck Blood Sugar Every 15 Minutes Until Blood Glucose is 70 mg/dL or Higher.  Once Blood Glucose is 70 mg/dL or Higher and if It Will Be more than 60 Minutes Until the Next Meal, Provide Appropriate Snack (Including Carbohydrate Food) Based on Meal Plan Order. Give Meal Tray As Soon As Possible.    07/07/19 1107    07/07/19 1108  Hypoglycemia Treatment - Patient Has IV Access - Unresponsive, NPO or Unable To Safely Swallow  Until Discontinued     Comments:  Administer 25g (50ml) D50W IV Push.  Recheck Blood Glucose 15 Minutes After Administration, if Blood Glucose Remains Less Than 70 mg/dl, Repeat Treatment   Recheck Blood Glucose 15 Minutes After 2nd Administration, if Blood Glucose Remains Less Than 70 mg/dL After 2nd Dose of D50W, Contact Provider for Further Treatment Orders & Consider Adding IVF With D5W for Maintenance    07/07/19 1107    07/07/19 1108  Hypoglycemia Treatment - Patient Without IV Access - Unresponsive, NPO or Unable To Safely Swallow  Until Discontinued     Comments:  Administer 1mg Glucagon SQ & Establish IV Access.  Turn Patient on Side - Nausea / Vomiting May Occur.  Recheck Blood Glucose 15 Minutes After Administration.  If Blood Glucose Remains Less  Than 70, Administer 25g D50W IV Push (50ml).  Recheck Blood Glucose 15 Minutes After Administration of D50W, if Blood Glucose Remains Less Than 70 mg/dl, Contact Provider for Further Treatment Orders & Consider Adding IVF With D5 for Maintenance    07/07/19 1107    07/07/19 1108  Hypoglycemia Treatment - Document Event & Patient Response to Interventions EMR, Document Medications on MAR  Continuous      07/07/19 1107    07/07/19 1108  Notify Provider - Hypoglycemia Treatment  Until Discontinued      07/07/19 1107    07/07/19 1107  dextrose (GLUTOSE) oral gel 15 g  Every 15 Minutes PRN      07/07/19 1107    07/07/19 1107  dextrose (D50W) 25 g/ 50mL Intravenous Solution 25 g  Every 15 Minutes PRN      07/07/19 1107    07/07/19 1107  glucagon (human recombinant) (GLUCAGEN DIAGNOSTIC) injection 1 mg  As Needed      07/07/19 1107    07/07/19 0600  CBC & Differential  Morning Draw      07/06/19 2033 07/07/19 0600  Comprehensive Metabolic Panel  Morning Draw      07/06/19 2033 07/07/19 0600  CBC Auto Differential  PROCEDURE ONCE      07/07/19 0001    07/07/19 0322  POC Glucose Once  Once      07/07/19 0311    07/06/19 2100  atorvastatin (LIPITOR) tablet 80 mg  Nightly      07/06/19 1256    07/06/19 2100  guaiFENesin (MUCINEX) 12 hr tablet 1,200 mg  Every 12 Hours Scheduled      07/06/19 1502    07/06/19 2026  POC Glucose Once  Once      07/06/19 2014    07/06/19 1800  methylPREDNISolone sodium succinate (SOLU-Medrol) injection 80 mg  Every 8 Hours      07/06/19 1254    07/06/19 1800  clopidogrel (PLAVIX) tablet 75 mg  Daily      07/06/19 1256    07/06/19 1800  metFORMIN (GLUCOPHAGE) tablet 500 mg  2 Times Daily With Meals      07/06/19 1256    07/06/19 1700  POC Glucose 4x Daily AC & at Bedtime  4 Times Daily Before Meals & at Bedtime      07/06/19 1329    07/06/19 1618  POC Glucose Once  Once      07/06/19 1606    07/06/19 1503  Oscillating Positive Expiratory Pressure (OPEP)  Once      07/06/19 1502    07/06/19  1430  budesonide (PULMICORT) nebulizer solution 0.5 mg  Daily - RT      07/06/19 1256    07/06/19 1430  ipratropium-albuterol (DUO-NEB) nebulizer solution 3 mL  Every 4 Hours - RT      07/06/19 1256    07/06/19 1345  diltiaZEM CD (CARDIZEM CD) 24 hr capsule 180 mg  Daily      07/06/19 1256    07/06/19 1345  escitalopram (LEXAPRO) tablet 10 mg  Daily      07/06/19 1256    07/06/19 1345  fluticasone (FLONASE) 50 MCG/ACT nasal spray 2 spray  Daily      07/06/19 1256    07/06/19 1345  pantoprazole (PROTONIX) EC tablet 40 mg  Daily      07/06/19 1256    07/06/19 1345  insulin lispro (humaLOG) injection 2-9 Units  4 Times Daily With Meals & Nightly,   Status:  Discontinued      07/06/19 1329    07/06/19 1330  Diet Regular; Consistent Carbohydrate  Diet Effective Now      07/06/19 1329    07/06/19 1330  Do NOT Hold Basal or Correction Scale Insulin When Patient is NPO, Hold Scheduled Mealtime (Bolus) Insulin if NPO  Continuous      07/06/19 1329    07/06/19 1330  Follow [facility] Hypoglycemia Standing Orders For Blood Glucose Less Than 70 mg/dL  Until Discontinued      07/06/19 1329    07/06/19 1330  Hypoglycemia Treatment - Alert Patient That is Not NPO and Can Safely Swallow  Until Discontinued     Comments:  Administer 4 oz Fruit Juice OR 4 oz Regular Soda OR 8 oz Milk OR 15-30 grams (1 tube) of Glucose Gel.  Recheck Blood Glucose 15 Minutes After Ingestion, Repeat Treatment & Continue to Recheck Blood Sugar Every 15 Minutes Until Blood Glucose is 70 mg/dL or Higher.  Once Blood Glucose is 70 mg/dL or Higher and if It Will Be more than 60 Minutes Until the Next Meal, Provide Appropriate Snack (Including Carbohydrate Food) Based on Meal Plan Order. Give Meal Tray As Soon As Possible.    07/06/19 1329    07/06/19 1330  Hypoglycemia Treatment - Patient Has IV Access - Unresponsive, NPO or Unable To Safely Swallow  Until Discontinued     Comments:  Administer 25g (50ml) D50W IV Push.  Recheck Blood Glucose 15 Minutes After  Administration, if Blood Glucose Remains Less Than 70 mg/dl, Repeat Treatment   Recheck Blood Glucose 15 Minutes After 2nd Administration, if Blood Glucose Remains Less Than 70 mg/dL After 2nd Dose of D50W, Contact Provider for Further Treatment Orders & Consider Adding IVF With D5W for Maintenance    07/06/19 1329    07/06/19 1330  Hypoglycemia Treatment - Patient Without IV Access - Unresponsive, NPO or Unable To Safely Swallow  Until Discontinued     Comments:  Administer 1mg Glucagon SQ & Establish IV Access.  Turn Patient on Side - Nausea / Vomiting May Occur.  Recheck Blood Glucose 15 Minutes After Administration.  If Blood Glucose Remains Less Than 70, Administer 25g D50W IV Push (50ml).  Recheck Blood Glucose 15 Minutes After Administration of D50W, if Blood Glucose Remains Less Than 70 mg/dl, Contact Provider for Further Treatment Orders & Consider Adding IVF With D5 for Maintenance    07/06/19 1329    07/06/19 1330  Hypoglycemia Treatment - Document Event & Patient Response to Interventions EMR, Document Medications on MAR  Continuous      07/06/19 1329    07/06/19 1330  Notify Provider - Hypoglycemia Treatment  Until Discontinued      07/06/19 1329    07/06/19 1329  dextrose (GLUTOSE) oral gel 15 g  Every 15 Minutes PRN,   Status:  Discontinued      07/06/19 1329    07/06/19 1329  dextrose (D50W) 25 g/ 50mL Intravenous Solution 25 g  Every 15 Minutes PRN,   Status:  Discontinued      07/06/19 1329    07/06/19 1329  glucagon (human recombinant) (GLUCAGEN DIAGNOSTIC) injection 1 mg  As Needed,   Status:  Discontinued      07/06/19 1329    07/06/19 1317  Blood Gas, Arterial  Once      07/06/19 1310    07/06/19 1257  Code Status and Medical Interventions:  Continuous      07/06/19 1256    07/06/19 1255  Blood Gas, Arterial  STAT      07/06/19 1254    07/06/19 1219  Inpatient Admission  Once      07/06/19 1219    07/06/19 1213  ipratropium-albuterol (DUO-NEB) nebulizer solution 3 mL  Once      07/06/19 1211     07/06/19 1034  Green Top (Gel)  PROCEDURE ONCE      07/06/19 1009    07/06/19 1012  methylPREDNISolone sodium succinate (SOLU-Medrol) injection 125 mg  Once      07/06/19 1010    07/06/19 1010  ECG 12 Lead  STAT      07/06/19 1009    07/06/19 1010  [place] Draw  STAT      07/06/19 1009    07/06/19 1010  Light Blue Top  PROCEDURE ONCE      07/06/19 1009    07/06/19 1010  Lavender Top  PROCEDURE ONCE      07/06/19 1009    07/06/19 1010  Red Top  PROCEDURE ONCE      07/06/19 1009    07/06/19 1010  [place] Blood Culture Bottle Set  PROCEDURE ONCE      07/06/19 1009    07/06/19 1010  CBC & Differential  Once      07/06/19 1010    07/06/19 1010  Comprehensive Metabolic Panel  Once      07/06/19 1010    07/06/19 1010  Blood Culture - Blood,  Once      07/06/19 1010    07/06/19 1010  Blood Culture - Blood,  Once      07/06/19 1010    07/06/19 1010  Lactic Acid, Plasma  Once      07/06/19 1010    07/06/19 1010  XR Chest 1 View  1 Time Imaging      07/06/19 1010    07/06/19 1010  Insert peripheral IV  Once      07/06/19 1010    07/06/19 1010  Cardiac Monitoring  Once      07/06/19 1010    07/06/19 1010  Pulse Oximetry, Continuous  Continuous      07/06/19 1010    07/06/19 1010  CBC Auto Differential  PROCEDURE ONCE      07/06/19 1010    07/06/19 1009  sodium chloride 0.9 % flush 10 mL  As Needed      07/06/19 1010    --  atorvastatin (LIPITOR) 80 MG tablet  Daily      07/06/19 1036    --  arformoterol (BROVANA) 15 MCG/2ML nebulizer solution  2 Times Daily - RT      07/06/19 1036    --  budesonide (PULMICORT) 0.5 MG/2ML nebulizer solution  Daily - RT      07/06/19 1036    --  clopidogrel (PLAVIX) 75 MG tablet  Daily      07/06/19 1036    --  diltiaZEM CD (CARDIZEM CD) 180 MG 24 hr capsule  Daily      07/06/19 1036    --  docusate sodium (COLACE) 100 MG capsule  2 Times Daily      07/06/19 1036    --  escitalopram (LEXAPRO) 10 MG tablet  Daily      07/06/19 1036    --  fluticasone (FLONASE) 50 MCG/ACT nasal spray  Daily       07/06/19 1036    --  insulin lispro (humaLOG) 100 UNIT/ML injection  3 Times Daily Before Meals      07/06/19 1036    --  ipratropium-albuterol (DUO-NEB) 0.5-2.5 mg/3 ml nebulizer  Every 4 Hours PRN      07/06/19 1036    --  loratadine (CLARITIN) 10 MG tablet  Daily      07/06/19 1036    --  metFORMIN (GLUCOPHAGE) 500 MG tablet  2 Times Daily With Meals      07/06/19 1036    --  montelukast (SINGULAIR) 10 MG tablet  Nightly      07/06/19 1036    --  pantoprazole (PROTONIX) 40 MG EC tablet  Daily      07/06/19 1036    --  tiotropium (SPIRIVA) 18 MCG per inhalation capsule  Daily - RT      07/06/19 1036    --  SCANNED EKG      07/06/19 0000        Ventilator/Non-Invasive Ventilation Settings (From admission, onward)    Start     Ordered    07/07/19 1148  NIPPV (CPAP or BIPAP)  At Bedtime & As Needed-RT     Question Answer Comment   Type: BIPAP    NIPPV Mask Interface: Per Patient Preference        07/07/19 1147             Physician Progress Notes (last 48 hours) (Notes from 7/5/2019  5:12 PM through 7/7/2019  5:12 PM)      Aubrey Parker MD at 7/7/2019 10:59 AM              [facility] Medicine Services  INPATIENT PROGRESS NOTE    Patient Name: Jessica Gregg  Date of Admission: 7/6/2019  Today's Date: 07/07/19  Length of Stay: 1  Primary Care Physician: Provider, No Known    Subjective   Chief Complaint: SOA  HPI   She says she does not feel much better, however she does appear to be breathing more comfortably.  No fevers through the night.  She has frequent coughing spells, but isn't coughing much up.  She is still wheezing quite a bit.        Review of Systems   Constitutional: Negative for fatigue and fever.   HENT: Negative for congestion and ear pain.    Eyes: Negative for pain and visual disturbance.   Respiratory: Positive for cough, shortness of breath and wheezing.    Cardiovascular: Negative for chest pain and palpitations.   Gastrointestinal: Negative for diarrhea,  nausea and vomiting.   Endocrine: Negative for heat intolerance.   Genitourinary: Negative for dysuria and frequency.   Musculoskeletal: Negative for arthralgias and back pain.   Skin: Negative for rash and wound.   Neurological: Negative for dizziness and light-headedness.   Psychiatric/Behavioral: Negative for confusion. The patient is not nervous/anxious.    All other systems reviewed and are negative.       All pertinent negatives and positives are as above. All other systems have been reviewed and are negative unless otherwise stated.     Objective    Temp:  [96.6 °F (35.9 °C)-97.8 °F (36.6 °C)] 97.5 °F (36.4 °C)  Heart Rate:  [] 111  Resp:  [14-34] 20  BP: (103-159)/() 129/63  Physical Exam   Constitutional: She is oriented to person, place, and time. She appears well-developed and well-nourished.   HENT:   Head: Normocephalic and atraumatic.   Right Ear: External ear normal.   Left Ear: External ear normal.   Nose: Nose normal.   Mouth/Throat: Oropharynx is clear and moist.   Eyes: Conjunctivae and EOM are normal.   Neck: Normal range of motion. Neck supple.   Cardiovascular: Normal rate, regular rhythm and normal heart sounds.   Pulmonary/Chest: Effort normal. No accessory muscle usage. No tachypnea. No respiratory distress. She has decreased breath sounds. She has wheezes.   Abdominal: Soft. Bowel sounds are normal. She exhibits no distension. There is no tenderness.   Musculoskeletal: Normal range of motion.   Neurological: She is alert and oriented to person, place, and time.   Skin: Skin is warm and dry.   Psychiatric: She has a normal mood and affect. Her speech is normal and behavior is normal. Cognition and memory are normal.           Results Review:  I have reviewed the labs, radiology results, and diagnostic studies.    Laboratory Data:   Results from last 7 days   Lab Units 07/07/19  0242 07/06/19  1004   WBC 10*3/mm3 7.69 11.88*   HEMOGLOBIN g/dL 10.7* 11.7*   HEMATOCRIT % 34.5* 38.6    PLATELETS 10*3/mm3 219 242        Results from last 7 days   Lab Units 07/07/19  0242 07/06/19  1004   SODIUM mmol/L 140 141   POTASSIUM mmol/L 4.0 4.4   CHLORIDE mmol/L 98 97*   CO2 mmol/L 34.0* 38.0*   BUN mg/dL 21 24*   CREATININE mg/dL 0.43* 0.57   CALCIUM mg/dL 9.4 9.2   BILIRUBIN mg/dL 0.4 0.3   ALK PHOS U/L 101 108   ALT (SGPT) U/L 23 21   AST (SGOT) U/L 25 36   GLUCOSE mg/dL 208* 169*       Culture Data:   Blood Culture   Date Value Ref Range Status   07/06/2019 No growth at 24 hours  Preliminary   07/06/2019 No growth at 24 hours  Preliminary       Radiology Data:   Imaging Results (last 24 hours)     Procedure Component Value Units Date/Time    XR Chest 1 View [[id number]] Collected:  07/06/19 1057     Updated:  07/06/19 1101    Narrative:       EXAMINATION: XR CHEST 1 VW-. 7/6/2019 10:57 AM CDT     CHEST, ONE VIEW:     HISTORY: Cough and shortness of breath     COMPARISON: None     A single frontal chest radiograph was obtained.     FINDINGS:     COPD and probable chronic lung changes identified.     There are no parenchymal infiltrates.     The heart is normal in size, pulmonary circulation appropriate, without  heart failure.     Aortic arch calcifications noted.     Surgical changes noted in the left neck.     No acute osseous abnormalities appreciated.                                     Impression:       1. Probable chronic lung changes.  2. No definite acute cardiopulmonary process.     This report was finalized on 07/06/2019 10:58 by Dr. Patric Benson MD.          I have reviewed the patient's current medications.     Assessment/Plan     Active Hospital Problems    Diagnosis   • Chronic obstructive pulmonary disease with acute exacerbation (CMS/HCC)        1.  Acute Hypoxic Respiratory Failure  - nebs  -IV abx  -IV steroids  -Mucinex  -Flutter  -Add CPT     2.  COPD AE  - nebs  -IV abx  -IV steroids  -Mucinex  -Flutter  -Add CPT     3.  T2DM  -SSI     4.  Tobacco abuse  -previously quit    Overall  much improved on exam  Move to floor  PT/OT  Will add Rocephin given severity              Discharge Planning: I expect the patient to be discharged to home in 3-4 days    Aubrey Parker MD   07/07/19   10:59 AM      Electronically signed by Aubrey Parker MD at 7/7/2019 11:06 AM       Consult Notes (last 48 hours) (Notes from 7/5/2019  5:13 PM through 7/7/2019  5:13 PM)     No notes of this type exist for this encounter.        Ashley Reyna RN   Registered Nurse      Plan of Care   Signed   Date of Service:  7/7/2019  4:43 PM   Creation Time:  7/7/2019  4:43 PM            Signed           Problem: Patient Care Overview  Goal: Plan of Care Review  Outcome: Ongoing (interventions implemented as appropriate)    07/07/19 1638   Coping/Psychosocial   Plan of Care Reviewed With patient;sibling   Plan of Care Review   Progress no change   OTHER   Outcome Summary Pt sats continued to drop with nasal cannula up to 6L. She was then switched to high flow, starting at 8L and increasing to 15L. Plans are to start bipap/cpap if needed. She has not reported feeling any problems when her sats were mid 80's.          Problem: Fall Risk (Adult)  Goal: Identify Related Risk Factors and Signs and Symptoms  Outcome: Ongoing (interventions implemented as appropriate)

## 2019-07-07 NOTE — PLAN OF CARE
Problem: Patient Care Overview  Goal: Plan of Care Review  Outcome: Ongoing (interventions implemented as appropriate)   07/07/19 5967   Coping/Psychosocial   Plan of Care Reviewed With patient;sibling   Plan of Care Review   Progress no change   OTHER   Outcome Summary Pt sats continued to drop with nasal cannula up to 6L. She was then switched to high flow, starting at 8L and increasing to 15L. Plans are to start bipap/cpap if needed. She has not reported feeling any problems when her sats were mid 80's.        Problem: Fall Risk (Adult)  Goal: Identify Related Risk Factors and Signs and Symptoms  Outcome: Ongoing (interventions implemented as appropriate)

## 2019-07-07 NOTE — PROGRESS NOTES
[facility] Medicine Services  INPATIENT PROGRESS NOTE    Patient Name: Jessica Gregg  Date of Admission: 7/6/2019  Today's Date: 07/07/19  Length of Stay: 1  Primary Care Physician: Provider, No Known    Subjective   Chief Complaint: SOA  HPI   She says she does not feel much better, however she does appear to be breathing more comfortably.  No fevers through the night.  She has frequent coughing spells, but isn't coughing much up.  She is still wheezing quite a bit.        Review of Systems   Constitutional: Negative for fatigue and fever.   HENT: Negative for congestion and ear pain.    Eyes: Negative for pain and visual disturbance.   Respiratory: Positive for cough, shortness of breath and wheezing.    Cardiovascular: Negative for chest pain and palpitations.   Gastrointestinal: Negative for diarrhea, nausea and vomiting.   Endocrine: Negative for heat intolerance.   Genitourinary: Negative for dysuria and frequency.   Musculoskeletal: Negative for arthralgias and back pain.   Skin: Negative for rash and wound.   Neurological: Negative for dizziness and light-headedness.   Psychiatric/Behavioral: Negative for confusion. The patient is not nervous/anxious.    All other systems reviewed and are negative.       All pertinent negatives and positives are as above. All other systems have been reviewed and are negative unless otherwise stated.     Objective    Temp:  [96.6 °F (35.9 °C)-97.8 °F (36.6 °C)] 97.5 °F (36.4 °C)  Heart Rate:  [] 111  Resp:  [14-34] 20  BP: (103-159)/() 129/63  Physical Exam   Constitutional: She is oriented to person, place, and time. She appears well-developed and well-nourished.   HENT:   Head: Normocephalic and atraumatic.   Right Ear: External ear normal.   Left Ear: External ear normal.   Nose: Nose normal.   Mouth/Throat: Oropharynx is clear and moist.   Eyes: Conjunctivae and EOM are normal.   Neck: Normal range of motion. Neck supple.    Cardiovascular: Normal rate, regular rhythm and normal heart sounds.   Pulmonary/Chest: Effort normal. No accessory muscle usage. No tachypnea. No respiratory distress. She has decreased breath sounds. She has wheezes.   Abdominal: Soft. Bowel sounds are normal. She exhibits no distension. There is no tenderness.   Musculoskeletal: Normal range of motion.   Neurological: She is alert and oriented to person, place, and time.   Skin: Skin is warm and dry.   Psychiatric: She has a normal mood and affect. Her speech is normal and behavior is normal. Cognition and memory are normal.           Results Review:  I have reviewed the labs, radiology results, and diagnostic studies.    Laboratory Data:   Results from last 7 days   Lab Units 07/07/19  0242 07/06/19  1004   WBC 10*3/mm3 7.69 11.88*   HEMOGLOBIN g/dL 10.7* 11.7*   HEMATOCRIT % 34.5* 38.6   PLATELETS 10*3/mm3 219 242        Results from last 7 days   Lab Units 07/07/19  0242 07/06/19  1004   SODIUM mmol/L 140 141   POTASSIUM mmol/L 4.0 4.4   CHLORIDE mmol/L 98 97*   CO2 mmol/L 34.0* 38.0*   BUN mg/dL 21 24*   CREATININE mg/dL 0.43* 0.57   CALCIUM mg/dL 9.4 9.2   BILIRUBIN mg/dL 0.4 0.3   ALK PHOS U/L 101 108   ALT (SGPT) U/L 23 21   AST (SGOT) U/L 25 36   GLUCOSE mg/dL 208* 169*       Culture Data:   Blood Culture   Date Value Ref Range Status   07/06/2019 No growth at 24 hours  Preliminary   07/06/2019 No growth at 24 hours  Preliminary       Radiology Data:   Imaging Results (last 24 hours)     Procedure Component Value Units Date/Time    XR Chest 1 View [[id number]] Collected:  07/06/19 1057     Updated:  07/06/19 1101    Narrative:       EXAMINATION: XR CHEST 1 VW-. 7/6/2019 10:57 AM CDT     CHEST, ONE VIEW:     HISTORY: Cough and shortness of breath     COMPARISON: None     A single frontal chest radiograph was obtained.     FINDINGS:     COPD and probable chronic lung changes identified.     There are no parenchymal infiltrates.     The heart is normal in  size, pulmonary circulation appropriate, without  heart failure.     Aortic arch calcifications noted.     Surgical changes noted in the left neck.     No acute osseous abnormalities appreciated.                                     Impression:       1. Probable chronic lung changes.  2. No definite acute cardiopulmonary process.     This report was finalized on 07/06/2019 10:58 by Dr. Patric Benson MD.          I have reviewed the patient's current medications.     Assessment/Plan     Active Hospital Problems    Diagnosis   • Chronic obstructive pulmonary disease with acute exacerbation (CMS/HCC)        1.  Acute Hypoxic Respiratory Failure  - nebs  -IV abx  -IV steroids  -Mucinex  -Flutter  -Add CPT     2.  COPD AE  - nebs  -IV abx  -IV steroids  -Mucinex  -Flutter  -Add CPT     3.  T2DM  -SSI     4.  Tobacco abuse  -previously quit    Overall much improved on exam  Move to floor  PT/OT  Will add Rocephin given severity              Discharge Planning: I expect the patient to be discharged to home in 3-4 days    Aubrey Parker MD   07/07/19   10:59 AM

## 2019-07-08 ENCOUNTER — APPOINTMENT (OUTPATIENT)
Dept: CARDIOLOGY | Facility: HOSPITAL | Age: 74
End: 2019-07-08

## 2019-07-08 PROBLEM — I25.10 CAD (CORONARY ARTERY DISEASE): Status: ACTIVE | Noted: 2019-07-08

## 2019-07-08 PROBLEM — E11.9 DIABETES MELLITUS (HCC): Status: ACTIVE | Noted: 2019-07-08

## 2019-07-08 PROBLEM — I50.9 CHF (CONGESTIVE HEART FAILURE) (HCC): Status: ACTIVE | Noted: 2019-07-08

## 2019-07-08 LAB
BH CV ECHO MEAS - AO MAX PG (FULL): 6.4 MMHG
BH CV ECHO MEAS - AO MAX PG: 16.8 MMHG
BH CV ECHO MEAS - AO MEAN PG (FULL): 3 MMHG
BH CV ECHO MEAS - AO MEAN PG: 7 MMHG
BH CV ECHO MEAS - AO V2 MAX: 205 CM/SEC
BH CV ECHO MEAS - AO V2 MEAN: 121 CM/SEC
BH CV ECHO MEAS - AO V2 VTI: 31.4 CM
BH CV ECHO MEAS - BSA(HAYCOCK): 1.6 M^2
BH CV ECHO MEAS - BSA: 1.6 M^2
BH CV ECHO MEAS - BZI_BMI: 26.6 KILOGRAMS/M^2
BH CV ECHO MEAS - BZI_METRIC_HEIGHT: 152.4 CM
BH CV ECHO MEAS - BZI_METRIC_WEIGHT: 61.7 KG
BH CV ECHO MEAS - EDV(CUBED): 67.9 ML
BH CV ECHO MEAS - EDV(MOD-SP4): 40.5 ML
BH CV ECHO MEAS - EDV(TEICH): 73.4 ML
BH CV ECHO MEAS - EF(CUBED): 84.5 %
BH CV ECHO MEAS - EF(MOD-SP4): 67.4 %
BH CV ECHO MEAS - EF(TEICH): 78.2 %
BH CV ECHO MEAS - ESV(CUBED): 10.5 ML
BH CV ECHO MEAS - ESV(MOD-SP4): 13.2 ML
BH CV ECHO MEAS - ESV(TEICH): 16 ML
BH CV ECHO MEAS - FS: 46.3 %
BH CV ECHO MEAS - IVS/LVPW: 1.2
BH CV ECHO MEAS - IVSD: 0.82 CM
BH CV ECHO MEAS - LA DIMENSION: 3 CM
BH CV ECHO MEAS - LAT PEAK E' VEL: 13.3 CM/SEC
BH CV ECHO MEAS - LV DIASTOLIC VOL/BSA (35-75): 25.6 ML/M^2
BH CV ECHO MEAS - LV MASS(C)D: 87.6 GRAMS
BH CV ECHO MEAS - LV MASS(C)DI: 55.3 GRAMS/M^2
BH CV ECHO MEAS - LV MAX PG: 10.4 MMHG
BH CV ECHO MEAS - LV MEAN PG: 4 MMHG
BH CV ECHO MEAS - LV SYSTOLIC VOL/BSA (12-30): 8.3 ML/M^2
BH CV ECHO MEAS - LV V1 MAX: 161 CM/SEC
BH CV ECHO MEAS - LV V1 MEAN: 80.6 CM/SEC
BH CV ECHO MEAS - LV V1 VTI: 22.7 CM
BH CV ECHO MEAS - LVIDD: 4.1 CM
BH CV ECHO MEAS - LVIDS: 2.2 CM
BH CV ECHO MEAS - LVLD AP4: 6.2 CM
BH CV ECHO MEAS - LVLS AP4: 5 CM
BH CV ECHO MEAS - LVPWD: 0.66 CM
BH CV ECHO MEAS - MED PEAK E' VEL: 10 CM/SEC
BH CV ECHO MEAS - MV DEC TIME: 0.11 SEC
BH CV ECHO MEAS - MV E MAX VEL: 184 CM/SEC
BH CV ECHO MEAS - SI(CUBED): 36.2 ML/M^2
BH CV ECHO MEAS - SI(MOD-SP4): 17.2 ML/M^2
BH CV ECHO MEAS - SI(TEICH): 36.2 ML/M^2
BH CV ECHO MEAS - SV(CUBED): 57.4 ML
BH CV ECHO MEAS - SV(MOD-SP4): 27.3 ML
BH CV ECHO MEAS - SV(TEICH): 57.3 ML
BH CV ECHO MEASUREMENTS AVERAGE E/E' RATIO: 15.79
GLUCOSE BLDC GLUCOMTR-MCNC: 177 MG/DL (ref 70–130)
GLUCOSE BLDC GLUCOMTR-MCNC: 229 MG/DL (ref 70–130)
GLUCOSE BLDC GLUCOMTR-MCNC: 243 MG/DL (ref 70–130)
GLUCOSE BLDC GLUCOMTR-MCNC: 311 MG/DL (ref 70–130)
LEFT ATRIUM VOLUME INDEX: 15.8 ML/M2
LEFT ATRIUM VOLUME: 24.9 CM3
MAXIMAL PREDICTED HEART RATE: 147 BPM
STRESS TARGET HR: 125 BPM

## 2019-07-08 PROCEDURE — 93306 TTE W/DOPPLER COMPLETE: CPT

## 2019-07-08 PROCEDURE — 82962 GLUCOSE BLOOD TEST: CPT

## 2019-07-08 PROCEDURE — 94668 MNPJ CHEST WALL SBSQ: CPT

## 2019-07-08 PROCEDURE — 94799 UNLISTED PULMONARY SVC/PX: CPT

## 2019-07-08 PROCEDURE — 25010000002 METHYLPREDNISOLONE PER 125 MG: Performed by: FAMILY MEDICINE

## 2019-07-08 PROCEDURE — 25010000002 METHYLPREDNISOLONE PER 40 MG: Performed by: FAMILY MEDICINE

## 2019-07-08 PROCEDURE — 93306 TTE W/DOPPLER COMPLETE: CPT | Performed by: INTERNAL MEDICINE

## 2019-07-08 PROCEDURE — 94660 CPAP INITIATION&MGMT: CPT

## 2019-07-08 PROCEDURE — 63710000001 INSULIN LISPRO (HUMAN) PER 5 UNITS: Performed by: FAMILY MEDICINE

## 2019-07-08 PROCEDURE — 25010000002 CEFTRIAXONE PER 250 MG: Performed by: FAMILY MEDICINE

## 2019-07-08 RX ORDER — NICOTINE POLACRILEX 4 MG
15 LOZENGE BUCCAL
Status: DISCONTINUED | OUTPATIENT
Start: 2019-07-08 | End: 2019-07-10 | Stop reason: HOSPADM

## 2019-07-08 RX ORDER — ACETYLCYSTEINE 200 MG/ML
3 SOLUTION ORAL; RESPIRATORY (INHALATION)
Status: DISCONTINUED | OUTPATIENT
Start: 2019-07-08 | End: 2019-07-10 | Stop reason: HOSPADM

## 2019-07-08 RX ORDER — DEXTROSE MONOHYDRATE 25 G/50ML
25 INJECTION, SOLUTION INTRAVENOUS
Status: DISCONTINUED | OUTPATIENT
Start: 2019-07-08 | End: 2019-07-10 | Stop reason: HOSPADM

## 2019-07-08 RX ORDER — METHYLPREDNISOLONE SODIUM SUCCINATE 40 MG/ML
60 INJECTION, POWDER, LYOPHILIZED, FOR SOLUTION INTRAMUSCULAR; INTRAVENOUS EVERY 6 HOURS
Status: DISCONTINUED | OUTPATIENT
Start: 2019-07-08 | End: 2019-07-09

## 2019-07-08 RX ADMIN — IPRATROPIUM BROMIDE AND ALBUTEROL SULFATE 3 ML: 2.5; .5 SOLUTION RESPIRATORY (INHALATION) at 03:29

## 2019-07-08 RX ADMIN — INSULIN LISPRO 16 UNITS: 100 INJECTION, SOLUTION INTRAVENOUS; SUBCUTANEOUS at 12:29

## 2019-07-08 RX ADMIN — IPRATROPIUM BROMIDE AND ALBUTEROL SULFATE 3 ML: 2.5; .5 SOLUTION RESPIRATORY (INHALATION) at 18:45

## 2019-07-08 RX ADMIN — INSULIN LISPRO 8 UNITS: 100 INJECTION, SOLUTION INTRAVENOUS; SUBCUTANEOUS at 08:43

## 2019-07-08 RX ADMIN — METFORMIN HYDROCHLORIDE 500 MG: 500 TABLET ORAL at 17:28

## 2019-07-08 RX ADMIN — DESMOPRESSIN ACETATE 80 MG: 0.2 TABLET ORAL at 21:16

## 2019-07-08 RX ADMIN — METFORMIN HYDROCHLORIDE 500 MG: 500 TABLET ORAL at 08:43

## 2019-07-08 RX ADMIN — HYDROXYZINE HYDROCHLORIDE 25 MG: 25 TABLET ORAL at 09:23

## 2019-07-08 RX ADMIN — PANTOPRAZOLE SODIUM 40 MG: 40 TABLET, DELAYED RELEASE ORAL at 08:43

## 2019-07-08 RX ADMIN — ACETYLCYSTEINE 3 ML: 200 INHALANT RESPIRATORY (INHALATION) at 18:45

## 2019-07-08 RX ADMIN — IPRATROPIUM BROMIDE AND ALBUTEROL SULFATE 3 ML: 2.5; .5 SOLUTION RESPIRATORY (INHALATION) at 23:00

## 2019-07-08 RX ADMIN — DILTIAZEM HYDROCHLORIDE 180 MG: 180 CAPSULE, COATED, EXTENDED RELEASE ORAL at 08:43

## 2019-07-08 RX ADMIN — SODIUM CHLORIDE, PRESERVATIVE FREE 10 ML: 5 INJECTION INTRAVENOUS at 08:44

## 2019-07-08 RX ADMIN — METHYLPREDNISOLONE SODIUM SUCCINATE 80 MG: 125 INJECTION, POWDER, FOR SOLUTION INTRAMUSCULAR; INTRAVENOUS at 01:57

## 2019-07-08 RX ADMIN — METHYLPREDNISOLONE SODIUM SUCCINATE 60 MG: 40 INJECTION, POWDER, LYOPHILIZED, FOR SOLUTION INTRAMUSCULAR; INTRAVENOUS at 15:10

## 2019-07-08 RX ADMIN — BUDESONIDE 0.5 MG: 0.5 INHALANT RESPIRATORY (INHALATION) at 14:31

## 2019-07-08 RX ADMIN — CLOPIDOGREL 75 MG: 75 TABLET, FILM COATED ORAL at 17:28

## 2019-07-08 RX ADMIN — IPRATROPIUM BROMIDE AND ALBUTEROL SULFATE 3 ML: 2.5; .5 SOLUTION RESPIRATORY (INHALATION) at 06:31

## 2019-07-08 RX ADMIN — GUAIFENESIN 1200 MG: 600 TABLET, EXTENDED RELEASE ORAL at 08:43

## 2019-07-08 RX ADMIN — GUAIFENESIN 1200 MG: 600 TABLET, EXTENDED RELEASE ORAL at 21:16

## 2019-07-08 RX ADMIN — IPRATROPIUM BROMIDE AND ALBUTEROL SULFATE 3 ML: 2.5; .5 SOLUTION RESPIRATORY (INHALATION) at 10:09

## 2019-07-08 RX ADMIN — ESCITALOPRAM 10 MG: 10 TABLET, FILM COATED ORAL at 08:43

## 2019-07-08 RX ADMIN — CEFTRIAXONE SODIUM 1 G: 1 INJECTION, POWDER, FOR SOLUTION INTRAMUSCULAR; INTRAVENOUS at 12:29

## 2019-07-08 RX ADMIN — METHYLPREDNISOLONE SODIUM SUCCINATE 60 MG: 40 INJECTION, POWDER, LYOPHILIZED, FOR SOLUTION INTRAMUSCULAR; INTRAVENOUS at 21:15

## 2019-07-08 RX ADMIN — METHYLPREDNISOLONE SODIUM SUCCINATE 60 MG: 40 INJECTION, POWDER, LYOPHILIZED, FOR SOLUTION INTRAMUSCULAR; INTRAVENOUS at 08:46

## 2019-07-08 RX ADMIN — SODIUM CHLORIDE, PRESERVATIVE FREE 10 ML: 5 INJECTION INTRAVENOUS at 21:16

## 2019-07-08 RX ADMIN — INSULIN LISPRO 8 UNITS: 100 INJECTION, SOLUTION INTRAVENOUS; SUBCUTANEOUS at 21:16

## 2019-07-08 RX ADMIN — SODIUM CHLORIDE, PRESERVATIVE FREE 10 ML: 5 INJECTION INTRAVENOUS at 01:57

## 2019-07-08 RX ADMIN — INSULIN LISPRO 4 UNITS: 100 INJECTION, SOLUTION INTRAVENOUS; SUBCUTANEOUS at 17:39

## 2019-07-08 RX ADMIN — IPRATROPIUM BROMIDE AND ALBUTEROL SULFATE 3 ML: 2.5; .5 SOLUTION RESPIRATORY (INHALATION) at 14:31

## 2019-07-08 RX ADMIN — HYDROXYZINE HYDROCHLORIDE 25 MG: 25 TABLET ORAL at 21:16

## 2019-07-08 RX ADMIN — FLUTICASONE PROPIONATE 2 SPRAY: 50 SPRAY, METERED NASAL at 08:44

## 2019-07-08 NOTE — PROGRESS NOTES
Discharge Planning Assessment  [facility]     Patient Name: Jessica Gregg  MRN: [id number]  Today's Date: 7/8/2019    Admit Date: 7/6/2019    Discharge Needs Assessment     Row Name 07/08/19 1056       Living Environment    Lives With  alone    Current Living Arrangements  home/apartment/condo    Primary Care Provided by  self    Provides Primary Care For  no one    Family Caregiver if Needed  sibling(s)    Quality of Family Relationships  supportive    Able to Return to Prior Arrangements  yes       Resource/Environmental Concerns    Resource/Environmental Concerns  none    Transportation Concerns  car, none       Transition Planning    Patient/Family Anticipates Transition to  home with family    Transportation Anticipated  family or friend will provide       Discharge Needs Assessment    Readmission Within the Last 30 Days  no previous admission in last 30 days    Concerns to be Addressed  discharge planning    Equipment Currently Used at Home  bipap/cpap;nebulizer;oxygen    Current Discharge Risk  chronically ill    Discharge Coordination/Progress  Patient was in area visiting her sister.  [place]  Patient utilizes home O2, has a PCP and RX coverage.  Social service consult stating need for DME and HH.  DME and HH can be arranged but will need specific orders.  Patient plans to return to her sister's home in this area immediately upon discharge.  She is unsure how long she would stay and where HH would need to be arranged.        Discharge Plan    No documentation.       Destination      No service coordination in this encounter.      Durable Medical Equipment      No service coordination in this encounter.      Dialysis/Infusion      No service coordination in this encounter.      Home Medical Care      No service coordination in this encounter.      Therapy      No service coordination in this encounter.      Community Resources      No service coordination in this encounter.           Demographic Summary    No documentation.       Functional Status    No documentation.       Psychosocial    No documentation.       Abuse/Neglect    No documentation.       Legal    No documentation.       Substance Abuse    No documentation.       Patient Forms    No documentation.           CONRAD Kuo

## 2019-07-08 NOTE — PLAN OF CARE
Problem: Patient Care Overview  Goal: Plan of Care Review   07/08/19 1703   OTHER   Outcome Summary Pt had lots of loose, congested coughing earlier in shift with inspiratory and expiratory wheezes noted. Changed from 6liter high flow cannula to bipap at hs with improvement of symptoms. Atarax ordered for anxiety and pt has seemed to rest well since given. Pt gets up to bedside commode to urinate but refuses to lie in any position other than supine or left side. Educated regarding concern for pressure injuries. Verbalizes understanding. Vital signs have been stable. Denies any complaints of pain.      Goal: Individualization and Mutuality  Outcome: Ongoing (interventions implemented as appropriate)    Goal: Discharge Needs Assessment  Outcome: Ongoing (interventions implemented as appropriate)    Goal: Interprofessional Rounds/Family Conf  Outcome: Ongoing (interventions implemented as appropriate)      Problem: Fall Risk (Adult)  Goal: Absence of Fall  Outcome: Ongoing (interventions implemented as appropriate)      Problem: ARDS (Acute Resp Distress Syndrome) (Adult)  Goal: Signs and Symptoms of Listed Potential Problems Will be Absent, Minimized or Managed (ARDS)  Outcome: Ongoing (interventions implemented as appropriate)      Problem: Skin Injury Risk (Adult)  Goal: Identify Related Risk Factors and Signs and Symptoms  Outcome: Ongoing (interventions implemented as appropriate)    Goal: Skin Health and Integrity  Outcome: Ongoing (interventions implemented as appropriate)

## 2019-07-08 NOTE — PLAN OF CARE
Problem: Patient Care Overview  Goal: Plan of Care Review  Outcome: Ongoing (interventions implemented as appropriate)  Patient still has a congested loose cough. O2 at 6l today.  She states that she feels better. Started on Mucomyst today. Patient was up in the chair this am. Vital signs are stable. Patient has at atarax today x1. She has remained calm. No complaints of pain.     Problem: Fall Risk (Adult)  Goal: Identify Related Risk Factors and Signs and Symptoms  Outcome: Outcome(s) achieved Date Met: 07/08/19    Goal: Absence of Fall  Outcome: Ongoing (interventions implemented as appropriate)      Problem: ARDS (Acute Resp Distress Syndrome) (Adult)  Goal: Signs and Symptoms of Listed Potential Problems Will be Absent, Minimized or Managed (ARDS)  Outcome: Ongoing (interventions implemented as appropriate)      Problem: Skin Injury Risk (Adult)  Goal: Identify Related Risk Factors and Signs and Symptoms  Outcome: Outcome(s) achieved Date Met: 07/08/19    Goal: Skin Health and Integrity  Outcome: Ongoing (interventions implemented as appropriate)

## 2019-07-08 NOTE — PLAN OF CARE
Problem: Patient Care Overview  Goal: Plan of Care Review  Outcome: Ongoing (interventions implemented as appropriate)   07/08/19 1439   Coping/Psychosocial   Plan of Care Reviewed With patient   Plan of Care Review   Progress improving   OTHER   Outcome Summary Patient transferred [facility] to [facility]. Patient denies pain. Saturations maintaining in the low to mid 90's on 6L [place]. Safety Maintained. Will continue to monitor.        Problem: Fall Risk (Adult)  Goal: Absence of Fall  Outcome: Ongoing (interventions implemented as appropriate)      Problem: ARDS (Acute Resp Distress Syndrome) (Adult)  Goal: Signs and Symptoms of Listed Potential Problems Will be Absent, Minimized or Managed (ARDS)  Outcome: Ongoing (interventions implemented as appropriate)      Problem: Skin Injury Risk (Adult)  Goal: Skin Health and Integrity  Outcome: Ongoing (interventions implemented as appropriate)      Problem: Chronic Obstructive Pulmonary Disease (Adult)  Goal: Signs and Symptoms of Listed Potential Problems Will be Absent, Minimized or Managed (Chronic Obstructive Pulmonary Disease)  Outcome: Ongoing (interventions implemented as appropriate)

## 2019-07-09 LAB
ALBUMIN SERPL-MCNC: 3.7 G/DL (ref 3.5–5)
ALBUMIN/GLOB SERPL: 1.4 G/DL (ref 1.1–2.5)
ALP SERPL-CCNC: 78 U/L (ref 24–120)
ALT SERPL W P-5'-P-CCNC: 27 U/L (ref 0–54)
ANION GAP SERPL CALCULATED.3IONS-SCNC: 7 MMOL/L (ref 4–13)
ARTICHOKE IGE QN: 74 MG/DL (ref 0–99)
AST SERPL-CCNC: 36 U/L (ref 7–45)
BASOPHILS # BLD AUTO: 0.01 10*3/MM3 (ref 0–0.2)
BASOPHILS NFR BLD AUTO: 0.1 % (ref 0–2)
BILIRUB SERPL-MCNC: 0.2 MG/DL (ref 0.1–1)
BUN BLD-MCNC: 38 MG/DL (ref 5–21)
BUN/CREAT SERPL: 63.3 (ref 7–25)
CALCIUM SPEC-SCNC: 9.5 MG/DL (ref 8.4–10.4)
CHLORIDE SERPL-SCNC: 100 MMOL/L (ref 98–110)
CHOLEST SERPL-MCNC: 152 MG/DL (ref 130–200)
CO2 SERPL-SCNC: 33 MMOL/L (ref 24–31)
CREAT BLD-MCNC: 0.6 MG/DL (ref 0.5–1.4)
DEPRECATED RDW RBC AUTO: 49.7 FL (ref 40–54)
EOSINOPHIL # BLD AUTO: 0 10*3/MM3 (ref 0–0.7)
EOSINOPHIL NFR BLD AUTO: 0 % (ref 0–4)
ERYTHROCYTE [DISTWIDTH] IN BLOOD BY AUTOMATED COUNT: 14.8 % (ref 12–15)
GFR SERPL CREATININE-BSD FRML MDRD: 98 ML/MIN/1.73
GLOBULIN UR ELPH-MCNC: 2.7 GM/DL
GLUCOSE BLD-MCNC: 237 MG/DL (ref 70–100)
GLUCOSE BLDC GLUCOMTR-MCNC: 243 MG/DL (ref 70–130)
GLUCOSE BLDC GLUCOMTR-MCNC: 256 MG/DL (ref 70–130)
GLUCOSE BLDC GLUCOMTR-MCNC: 279 MG/DL (ref 70–130)
GLUCOSE BLDC GLUCOMTR-MCNC: 285 MG/DL (ref 70–130)
HBA1C MFR BLD: 7.1 %
HCT VFR BLD AUTO: 33.5 % (ref 37–47)
HDLC SERPL-MCNC: 60 MG/DL
HGB BLD-MCNC: 10.3 G/DL (ref 12–16)
IMM GRANULOCYTES # BLD AUTO: 0.08 10*3/MM3 (ref 0–0.05)
IMM GRANULOCYTES NFR BLD AUTO: 0.5 % (ref 0–5)
LDLC/HDLC SERPL: 1.26 {RATIO}
LYMPHOCYTES # BLD AUTO: 0.51 10*3/MM3 (ref 0.72–4.86)
LYMPHOCYTES NFR BLD AUTO: 3.3 % (ref 15–45)
MCH RBC QN AUTO: 28.2 PG (ref 28–32)
MCHC RBC AUTO-ENTMCNC: 30.7 G/DL (ref 33–36)
MCV RBC AUTO: 91.8 FL (ref 82–98)
MONOCYTES # BLD AUTO: 0.28 10*3/MM3 (ref 0.19–1.3)
MONOCYTES NFR BLD AUTO: 1.8 % (ref 4–12)
NEUTROPHILS # BLD AUTO: 14.62 10*3/MM3 (ref 1.87–8.4)
NEUTROPHILS NFR BLD AUTO: 94.3 % (ref 39–78)
NRBC BLD AUTO-RTO: 0 /100 WBC (ref 0–0.2)
PLATELET # BLD AUTO: 240 10*3/MM3 (ref 130–400)
PMV BLD AUTO: 10.9 FL (ref 6–12)
POTASSIUM BLD-SCNC: 4.3 MMOL/L (ref 3.5–5.3)
PROT SERPL-MCNC: 6.4 G/DL (ref 6.3–8.7)
RBC # BLD AUTO: 3.65 10*6/MM3 (ref 4.2–5.4)
SODIUM BLD-SCNC: 140 MMOL/L (ref 135–145)
T4 FREE SERPL-MCNC: 1.21 NG/DL (ref 0.78–2.19)
TRIGL SERPL-MCNC: 82 MG/DL (ref 0–149)
TSH SERPL DL<=0.05 MIU/L-ACNC: 0.15 MIU/ML (ref 0.47–4.68)
WBC NRBC COR # BLD: 15.5 10*3/MM3 (ref 4.8–10.8)

## 2019-07-09 PROCEDURE — 25010000002 METHYLPREDNISOLONE PER 40 MG: Performed by: FAMILY MEDICINE

## 2019-07-09 PROCEDURE — 84439 ASSAY OF FREE THYROXINE: CPT | Performed by: FAMILY MEDICINE

## 2019-07-09 PROCEDURE — 82962 GLUCOSE BLOOD TEST: CPT

## 2019-07-09 PROCEDURE — 94799 UNLISTED PULMONARY SVC/PX: CPT

## 2019-07-09 PROCEDURE — 83036 HEMOGLOBIN GLYCOSYLATED A1C: CPT | Performed by: FAMILY MEDICINE

## 2019-07-09 PROCEDURE — 25010000002 CEFTRIAXONE PER 250 MG: Performed by: FAMILY MEDICINE

## 2019-07-09 PROCEDURE — 80053 COMPREHEN METABOLIC PANEL: CPT | Performed by: FAMILY MEDICINE

## 2019-07-09 PROCEDURE — 94669 MECHANICAL CHEST WALL OSCILL: CPT

## 2019-07-09 PROCEDURE — 63710000001 INSULIN LISPRO (HUMAN) PER 5 UNITS: Performed by: FAMILY MEDICINE

## 2019-07-09 PROCEDURE — 85025 COMPLETE CBC W/AUTO DIFF WBC: CPT | Performed by: FAMILY MEDICINE

## 2019-07-09 PROCEDURE — 84443 ASSAY THYROID STIM HORMONE: CPT | Performed by: FAMILY MEDICINE

## 2019-07-09 PROCEDURE — 80061 LIPID PANEL: CPT | Performed by: FAMILY MEDICINE

## 2019-07-09 RX ORDER — METHYLPREDNISOLONE SODIUM SUCCINATE 40 MG/ML
40 INJECTION, POWDER, LYOPHILIZED, FOR SOLUTION INTRAMUSCULAR; INTRAVENOUS EVERY 6 HOURS
Status: DISCONTINUED | OUTPATIENT
Start: 2019-07-09 | End: 2019-07-10 | Stop reason: HOSPADM

## 2019-07-09 RX ADMIN — METFORMIN HYDROCHLORIDE 500 MG: 500 TABLET ORAL at 08:22

## 2019-07-09 RX ADMIN — INSULIN LISPRO 8 UNITS: 100 INJECTION, SOLUTION INTRAVENOUS; SUBCUTANEOUS at 17:27

## 2019-07-09 RX ADMIN — DILTIAZEM HYDROCHLORIDE 180 MG: 180 CAPSULE, COATED, EXTENDED RELEASE ORAL at 08:23

## 2019-07-09 RX ADMIN — ACETYLCYSTEINE 3 ML: 200 INHALANT RESPIRATORY (INHALATION) at 06:44

## 2019-07-09 RX ADMIN — GUAIFENESIN 1200 MG: 600 TABLET, EXTENDED RELEASE ORAL at 08:22

## 2019-07-09 RX ADMIN — SODIUM CHLORIDE, PRESERVATIVE FREE 10 ML: 5 INJECTION INTRAVENOUS at 21:03

## 2019-07-09 RX ADMIN — DESMOPRESSIN ACETATE 80 MG: 0.2 TABLET ORAL at 21:03

## 2019-07-09 RX ADMIN — IPRATROPIUM BROMIDE AND ALBUTEROL SULFATE 3 ML: 2.5; .5 SOLUTION RESPIRATORY (INHALATION) at 14:47

## 2019-07-09 RX ADMIN — BUDESONIDE 0.5 MG: 0.5 INHALANT RESPIRATORY (INHALATION) at 06:55

## 2019-07-09 RX ADMIN — CEFTRIAXONE SODIUM 1 G: 1 INJECTION, POWDER, FOR SOLUTION INTRAMUSCULAR; INTRAVENOUS at 12:49

## 2019-07-09 RX ADMIN — METFORMIN HYDROCHLORIDE 500 MG: 500 TABLET ORAL at 17:27

## 2019-07-09 RX ADMIN — METHYLPREDNISOLONE SODIUM SUCCINATE 40 MG: 40 INJECTION, POWDER, FOR SOLUTION INTRAMUSCULAR; INTRAVENOUS at 15:43

## 2019-07-09 RX ADMIN — IPRATROPIUM BROMIDE AND ALBUTEROL SULFATE 3 ML: 2.5; .5 SOLUTION RESPIRATORY (INHALATION) at 19:06

## 2019-07-09 RX ADMIN — IPRATROPIUM BROMIDE AND ALBUTEROL SULFATE 3 ML: 2.5; .5 SOLUTION RESPIRATORY (INHALATION) at 06:44

## 2019-07-09 RX ADMIN — HYDROXYZINE HYDROCHLORIDE 25 MG: 25 TABLET ORAL at 21:06

## 2019-07-09 RX ADMIN — IPRATROPIUM BROMIDE AND ALBUTEROL SULFATE 3 ML: 2.5; .5 SOLUTION RESPIRATORY (INHALATION) at 10:59

## 2019-07-09 RX ADMIN — INSULIN LISPRO 12 UNITS: 100 INJECTION, SOLUTION INTRAVENOUS; SUBCUTANEOUS at 08:21

## 2019-07-09 RX ADMIN — PANTOPRAZOLE SODIUM 40 MG: 40 TABLET, DELAYED RELEASE ORAL at 08:22

## 2019-07-09 RX ADMIN — LINAGLIPTIN 5 MG: 5 TABLET, FILM COATED ORAL at 12:37

## 2019-07-09 RX ADMIN — METHYLPREDNISOLONE SODIUM SUCCINATE 60 MG: 40 INJECTION, POWDER, LYOPHILIZED, FOR SOLUTION INTRAMUSCULAR; INTRAVENOUS at 09:47

## 2019-07-09 RX ADMIN — GUAIFENESIN 1200 MG: 600 TABLET, EXTENDED RELEASE ORAL at 21:03

## 2019-07-09 RX ADMIN — ACETYLCYSTEINE 3 ML: 200 INHALANT RESPIRATORY (INHALATION) at 19:06

## 2019-07-09 RX ADMIN — METHYLPREDNISOLONE SODIUM SUCCINATE 60 MG: 40 INJECTION, POWDER, LYOPHILIZED, FOR SOLUTION INTRAMUSCULAR; INTRAVENOUS at 03:52

## 2019-07-09 RX ADMIN — CLOPIDOGREL 75 MG: 75 TABLET, FILM COATED ORAL at 17:27

## 2019-07-09 RX ADMIN — INSULIN LISPRO 12 UNITS: 100 INJECTION, SOLUTION INTRAVENOUS; SUBCUTANEOUS at 21:03

## 2019-07-09 RX ADMIN — METHYLPREDNISOLONE SODIUM SUCCINATE 40 MG: 40 INJECTION, POWDER, FOR SOLUTION INTRAMUSCULAR; INTRAVENOUS at 21:03

## 2019-07-09 RX ADMIN — IPRATROPIUM BROMIDE AND ALBUTEROL SULFATE 3 ML: 2.5; .5 SOLUTION RESPIRATORY (INHALATION) at 03:15

## 2019-07-09 RX ADMIN — SODIUM CHLORIDE, PRESERVATIVE FREE 10 ML: 5 INJECTION INTRAVENOUS at 08:21

## 2019-07-09 RX ADMIN — ESCITALOPRAM 10 MG: 10 TABLET, FILM COATED ORAL at 08:22

## 2019-07-09 RX ADMIN — INSULIN LISPRO 12 UNITS: 100 INJECTION, SOLUTION INTRAVENOUS; SUBCUTANEOUS at 12:36

## 2019-07-09 RX ADMIN — IPRATROPIUM BROMIDE AND ALBUTEROL SULFATE 3 ML: 2.5; .5 SOLUTION RESPIRATORY (INHALATION) at 23:39

## 2019-07-09 NOTE — PROGRESS NOTES
[facility] Medicine Services  INPATIENT PROGRESS NOTE    Length of Stay: 3  Date of Admission: 7/6/2019  Primary Care Physician: Provider, No Known    Subjective   Chief Complaint: Respiratory failure/COPD exacerbation    HPI   Patient still wheezing.  Patient breathing much better.  Patient denies any chest pain.  Continue to wean down steroids and oxygen.    Review of Systems   Constitutional: Positive for activity change, appetite change and fatigue. Negative for chills and fever.   HENT: Negative for hearing loss, nosebleeds, tinnitus and trouble swallowing.    Eyes: Negative for visual disturbance.   Respiratory: Positive for cough, shortness of breath and wheezing. Negative for chest tightness.    Cardiovascular: Negative for chest pain, palpitations and leg swelling.   Gastrointestinal: Negative for abdominal distention, abdominal pain, blood in stool, constipation, diarrhea, nausea and vomiting.   Endocrine: Negative for cold intolerance, heat intolerance, polydipsia, polyphagia and polyuria.   Genitourinary: Negative for decreased urine volume, difficulty urinating, dysuria, flank pain, frequency and hematuria.   Musculoskeletal: Positive for arthralgias, gait problem and myalgias. Negative for joint swelling.   Skin: Negative for rash.   Allergic/Immunologic: Negative for immunocompromised state.   Neurological: Positive for weakness. Negative for dizziness, syncope, light-headedness and headaches.   Hematological: Negative for adenopathy. Does not bruise/bleed easily.   Psychiatric/Behavioral: Negative for confusion and sleep disturbance. The patient is not nervous/anxious.      All pertinent negatives and positives are as above. All other systems have been reviewed and are negative unless otherwise stated.     Objective    Temp:  [97.5 °F (36.4 °C)-97.8 °F (36.6 °C)] 97.8 °F (36.6 °C)  Heart Rate:  [] 95  Resp:  [16-22] 20  BP: (119-162)/(54-80)  140/70    Intake/Output Summary (Last 24 hours) at 7/9/2019 1030  Last data filed at 7/8/2019 2145  Gross per 24 hour   Intake 240 ml   Output 360 ml   Net -120 ml     Physical Exam  Constitutional: She appears well-developed.   HENT:   Head: Normocephalic and atraumatic.   Eyes: Conjunctivae are normal. Pupils are equal, round, and reactive to light.   Neck: Neck supple. No JVD present. No thyromegaly present.   Cardiovascular: Normal rate, regular rhythm, normal heart sounds and intact distal pulses. Exam reveals no gallop and no friction rub.   No murmur heard.  Pulmonary/Chest: No respiratory distress. She has wheezes. She has no rales. She exhibits no tenderness.   Rhonchi/wheezing bilateral.  Diminished breath sound bilateral.   Abdominal: Soft. Bowel sounds are normal. She exhibits no distension. There is no tenderness. There is no rebound and no guarding.   Musculoskeletal: Normal range of motion. She exhibits no edema, tenderness or deformity.   Lymphadenopathy:     She has no cervical adenopathy.   Neurological: She is alert. She displays normal reflexes. No cranial nerve deficit. She exhibits abnormal muscle tone. Coordination abnormal.   Skin: Skin is warm and dry. No rash noted.   Psychiatric: She has a normal mood and affect. Her behavior is normal.   Nursing note and vitals reviewed.         Results Review:  Lab Results (last 24 hours)     Procedure Component Value Units Date/Time    POC Glucose Once [[id number]]  (Abnormal) Collected:  07/09/19 0727    Specimen:  Blood Updated:  07/09/19 0752     Glucose 279 mg/dL      Comment: : [id number] Luciano Denise ID: [id number]       Hemoglobin A1c [[id number]] Collected:  07/09/19 0450    Specimen:  Blood Updated:  07/09/19 0701     Hemoglobin A1C 7.1 %     Narrative:       Less than 6.0           Non-Diabetic Range  6.0-7.0                 ADA Therapeutic Target  Greater than 7.0        Action Suggested    TSH [[id number]]  (Abnormal) Collected:   07/09/19 0450    Specimen:  Blood Updated:  07/09/19 0611     TSH 0.155 mIU/mL     Lipid Panel [[id number]] Collected:  07/09/19 0450    Specimen:  Blood Updated:  07/09/19 0549     Total Cholesterol 152 mg/dL      Triglycerides 82 mg/dL      HDL Cholesterol 60 mg/dL      LDL Cholesterol  74 mg/dL      LDL/HDL Ratio 1.26    POC Glucose Once [[id number]]  (Abnormal) Collected:  07/08/19 2043    Specimen:  Blood Updated:  07/08/19 2056     Glucose 229 mg/dL      Comment: : [id number] Daniel KennedylaMeter ID: [id number]       POC Glucose Once [[id number]]  (Abnormal) Collected:  07/08/19 1729    Specimen:  Blood Updated:  07/08/19 1759     Glucose 177 mg/dL      Comment: : [id number] Corriejosh Duval MMeter ID: [id number]       POC Glucose Once [[id number]]  (Abnormal) Collected:  07/08/19 1105    Specimen:  Blood Updated:  07/08/19 1118     Glucose 311 mg/dL      Comment: : [id number] Tye AmandaMeter ID: [id number]       Blood Culture - Blood, Arm, Left [[id number]] Collected:  07/06/19 1005    Specimen:  Blood from Arm, Left Updated:  07/08/19 1045     Blood Culture No growth at 2 days    Blood Culture - Blood, Arm, Right [[id number]] Collected:  07/06/19 1004    Specimen:  Blood from Arm, Right Updated:  07/08/19 1045     Blood Culture No growth at 2 days           Cultures:  Blood Culture   Date Value Ref Range Status   07/06/2019 No growth at 2 days  Preliminary   07/06/2019 No growth at 2 days  Preliminary       Radiology Data:    Imaging Results (last 24 hours)     ** No results found for the last 24 hours. **          Allergies   Allergen Reactions   • Latex Rash       Scheduled meds:     acetylcysteine 3 mL Nebulization BID - RT   atorvastatin 80 mg Oral Nightly   budesonide 0.5 mg Nebulization Daily - RT   ceftriaxone 1 g Intravenous Q24H   clopidogrel 75 mg Oral Daily   diltiaZEM  mg Oral Daily   escitalopram 10 mg Oral Daily   fluticasone 2 spray Nasal Daily   guaiFENesin 1,200 mg Oral Q12H    insulin lispro 0-24 Units Subcutaneous 4x Daily With Meals & Nightly   ipratropium-albuterol 3 mL Nebulization Q4H - RT   metFORMIN 500 mg Oral BID With Meals   methylPREDNISolone sodium succinate 60 mg Intravenous Q6H   pantoprazole 40 mg Oral Daily       PRN meds:  dextrose  •  dextrose  •  glucagon (human recombinant)  •  hydrOXYzine  •  [COMPLETED] Insert peripheral IV **AND** sodium chloride    Assessment/Plan       Chronic obstructive pulmonary disease with acute exacerbation (CMS/HCC)    CHF (congestive heart failure) (CMS/HCC)    Diabetes mellitus (CMS/HCC)    CAD (coronary artery disease)      Plan:  COPD exacerbation/acute hypoxic respiratory failure..  Duo nebs.  Decreaser Solu-Medrol.  Pulmicort.  Continue Mucinex.  Chest physiotherapy.  BiPAP PRN.  Continue oxygen. Add mucomyst.   Chest x-ray-chronic lung changes, no acute cardiopulmonary process.  Leukocytosis worsening secondary to steroid.     Diabetes.  Increase to high sliding scale.  Continue metformin.  Hemoglobin A1c 7.1.  Add Tradjenta.     Hyperlipidemia/hypertension/CAD/Hx of CHF.  Continue Lipitor.  Continue Plavix.  Continue Cardizem CD.   Echocardiogram ejection fraction 66%.      Depression.  Continue Lexapro.     Reflux.  Continue Protonix.    Low TSH.  Free T4- normal.     Allergy.  Continue Flonase.  Vistaril as needed.     Previously quit smoking.     Fairly low degree of suspicion for Pulmonary embolism at this point.  Given her degree of wheezing, this is most likely COPD exacerbation.  Wells Score for PE is only 1.5, making her low risk.  Legs are symmetrical without swelling or edema       Nutrition.  Cardiac/consistent carb diet.     Blood culture shows no growth in 3 days.     Discharge Plannin-4 days.  May need vest physiotherapy at home.  Patient is from [place].  Patient on chronic oxygen at home at 4 L.  Patient use BiPAP machine at home.    Gregg Jones MD   19   10:30 AM

## 2019-07-09 NOTE — PLAN OF CARE
Problem: Patient Care Overview  Goal: Plan of Care Review  Outcome: Ongoing (interventions implemented as appropriate)   07/09/19 0301   Coping/Psychosocial   Plan of Care Reviewed With patient   Plan of Care Review   Progress improving   OTHER   Outcome Summary No c/o of pain voiced this shift, O2 @ 6L Hi [place] n[place], home bipap while sleep, VSS, safety maintained, will continue to monitor.

## 2019-07-09 NOTE — PLAN OF CARE
Problem: Patient Care Overview  Goal: Plan of Care Review  Outcome: Ongoing (interventions implemented as appropriate)  No c/o pain, pt safety maintained, bipap at nite and currently 3 l nc by RT, pt will plan to d/c to sisters home for further home care, will upsdate as available  Goal: Interprofessional Rounds/Family Conf  Outcome: Ongoing (interventions implemented as appropriate)

## 2019-07-10 VITALS
DIASTOLIC BLOOD PRESSURE: 54 MMHG | RESPIRATION RATE: 18 BRPM | SYSTOLIC BLOOD PRESSURE: 118 MMHG | OXYGEN SATURATION: 92 % | BODY MASS INDEX: 26.8 KG/M2 | HEART RATE: 103 BPM | HEIGHT: 60 IN | WEIGHT: 136.5 LBS | TEMPERATURE: 97.7 F

## 2019-07-10 LAB
GLUCOSE BLDC GLUCOMTR-MCNC: 227 MG/DL (ref 70–130)
GLUCOSE BLDC GLUCOMTR-MCNC: 283 MG/DL (ref 70–130)

## 2019-07-10 PROCEDURE — 25010000002 METHYLPREDNISOLONE PER 40 MG: Performed by: FAMILY MEDICINE

## 2019-07-10 PROCEDURE — 63710000001 INSULIN LISPRO (HUMAN) PER 5 UNITS: Performed by: FAMILY MEDICINE

## 2019-07-10 PROCEDURE — 94799 UNLISTED PULMONARY SVC/PX: CPT

## 2019-07-10 PROCEDURE — 94760 N-INVAS EAR/PLS OXIMETRY 1: CPT

## 2019-07-10 PROCEDURE — 94669 MECHANICAL CHEST WALL OSCILL: CPT

## 2019-07-10 PROCEDURE — 82962 GLUCOSE BLOOD TEST: CPT

## 2019-07-10 RX ORDER — HYDROXYZINE HYDROCHLORIDE 25 MG/1
25 TABLET, FILM COATED ORAL EVERY 8 HOURS PRN
Qty: 24 TABLET | Refills: 0 | Status: SHIPPED | OUTPATIENT
Start: 2019-07-10

## 2019-07-10 RX ORDER — IPRATROPIUM BROMIDE AND ALBUTEROL SULFATE 2.5; .5 MG/3ML; MG/3ML
3 SOLUTION RESPIRATORY (INHALATION)
Qty: 360 ML | Refills: 2 | Status: SHIPPED | OUTPATIENT
Start: 2019-07-10 | End: 2020-07-16 | Stop reason: SDUPTHER

## 2019-07-10 RX ORDER — CEFDINIR 300 MG/1
300 CAPSULE ORAL 2 TIMES DAILY
Qty: 8 CAPSULE | Refills: 0 | Status: SHIPPED | OUTPATIENT
Start: 2019-07-10 | End: 2019-07-14

## 2019-07-10 RX ORDER — GUAIFENESIN 600 MG/1
1200 TABLET, EXTENDED RELEASE ORAL EVERY 12 HOURS SCHEDULED
Qty: 60 TABLET | Refills: 0 | Status: SHIPPED | OUTPATIENT
Start: 2019-07-10 | End: 2019-07-10

## 2019-07-10 RX ORDER — METHYLPREDNISOLONE 4 MG/1
TABLET ORAL
Qty: 21 TABLET | Refills: 0 | Status: SHIPPED | OUTPATIENT
Start: 2019-07-10 | End: 2019-07-10 | Stop reason: SDUPTHER

## 2019-07-10 RX ORDER — CEFDINIR 300 MG/1
300 CAPSULE ORAL 2 TIMES DAILY
Qty: 8 CAPSULE | Refills: 0 | Status: SHIPPED | OUTPATIENT
Start: 2019-07-10 | End: 2019-07-10 | Stop reason: SDUPTHER

## 2019-07-10 RX ORDER — METHYLPREDNISOLONE 4 MG/1
TABLET ORAL
Qty: 21 TABLET | Refills: 0 | Status: ON HOLD | OUTPATIENT
Start: 2019-07-10 | End: 2020-05-31

## 2019-07-10 RX ORDER — GUAIFENESIN 600 MG/1
1200 TABLET, EXTENDED RELEASE ORAL EVERY 12 HOURS SCHEDULED
Qty: 60 TABLET | Refills: 0 | Status: ON HOLD | OUTPATIENT
Start: 2019-07-10 | End: 2020-06-06 | Stop reason: SDUPTHER

## 2019-07-10 RX ORDER — HYDROXYZINE HYDROCHLORIDE 25 MG/1
25 TABLET, FILM COATED ORAL EVERY 8 HOURS PRN
Qty: 24 TABLET | Refills: 0 | Status: SHIPPED | OUTPATIENT
Start: 2019-07-10 | End: 2019-07-10

## 2019-07-10 RX ADMIN — ESCITALOPRAM 10 MG: 10 TABLET, FILM COATED ORAL at 08:39

## 2019-07-10 RX ADMIN — SODIUM CHLORIDE, PRESERVATIVE FREE 10 ML: 5 INJECTION INTRAVENOUS at 08:39

## 2019-07-10 RX ADMIN — METFORMIN HYDROCHLORIDE 500 MG: 500 TABLET ORAL at 08:39

## 2019-07-10 RX ADMIN — GUAIFENESIN 1200 MG: 600 TABLET, EXTENDED RELEASE ORAL at 08:39

## 2019-07-10 RX ADMIN — ACETYLCYSTEINE 1.5 ML: 200 INHALANT RESPIRATORY (INHALATION) at 07:08

## 2019-07-10 RX ADMIN — IPRATROPIUM BROMIDE AND ALBUTEROL SULFATE 3 ML: 2.5; .5 SOLUTION RESPIRATORY (INHALATION) at 07:08

## 2019-07-10 RX ADMIN — PANTOPRAZOLE SODIUM 40 MG: 40 TABLET, DELAYED RELEASE ORAL at 08:39

## 2019-07-10 RX ADMIN — BUDESONIDE 0.5 MG: 0.5 INHALANT RESPIRATORY (INHALATION) at 07:08

## 2019-07-10 RX ADMIN — METHYLPREDNISOLONE SODIUM SUCCINATE 40 MG: 40 INJECTION, POWDER, FOR SOLUTION INTRAMUSCULAR; INTRAVENOUS at 06:34

## 2019-07-10 RX ADMIN — IPRATROPIUM BROMIDE AND ALBUTEROL SULFATE 3 ML: 2.5; .5 SOLUTION RESPIRATORY (INHALATION) at 11:19

## 2019-07-10 RX ADMIN — INSULIN LISPRO 8 UNITS: 100 INJECTION, SOLUTION INTRAVENOUS; SUBCUTANEOUS at 08:38

## 2019-07-10 RX ADMIN — LINAGLIPTIN 5 MG: 5 TABLET, FILM COATED ORAL at 08:39

## 2019-07-10 RX ADMIN — IPRATROPIUM BROMIDE AND ALBUTEROL SULFATE 3 ML: 2.5; .5 SOLUTION RESPIRATORY (INHALATION) at 03:34

## 2019-07-10 RX ADMIN — FLUTICASONE PROPIONATE 2 SPRAY: 50 SPRAY, METERED NASAL at 08:40

## 2019-07-10 RX ADMIN — DILTIAZEM HYDROCHLORIDE 180 MG: 180 CAPSULE, COATED, EXTENDED RELEASE ORAL at 08:39

## 2019-07-10 NOTE — DISCHARGE SUMMARY
[facility] Medicine Services  DISCHARGE SUMMARY       Date of Admission: 7/6/2019  Date of Discharge:  7/10/2019  Primary Care Physician: Provider, No Known    Presenting Problem/History of Present Illness:  Chronic obstructive pulmonary disease with acute exacerbation (CMS/[facility]) [J44.1]     Final Discharge Diagnoses:  Active Hospital Problems    Diagnosis   • CHF (congestive heart failure) (CMS/[facility])   • Diabetes mellitus (CMS/[facility])   • CAD (coronary artery disease)   • Chronic obstructive pulmonary disease with acute exacerbation (CMS/[facility])       Pertinent Test Results:   IMPRESSION: Chest x-ray  1. Probable chronic lung changes.  2. No definite acute cardiopulmonary process    Interpretation Summary echocardiogram    · Left ventricular systolic function is normal.  · Right ventricular cavity is mildly dilated but has normal systolic function.  · No significant valvular pathology.        Echocardiogram Findings     Left Ventricle Left ventricular systolic function is normal. Estimated EF appears to be in the range of 66 - 70%. Normal left ventricular cavity size and wall thickness noted. All left ventricular wall segments contract normally.     Chief Complaint on Day of Discharge: none    History of Present Illness on Day of Discharge:   Ms. Velasquez is a pleasant 73 year old lady with a history of tobacco abuse and COPD.  She came to [place] to visit her sister.  She lives in [place] about 6 hours from here.  Today she began to feel very SOA.  She is coughing and wheezing.  She denies fevers or chills.  She denies leg swelling or weight gain.  She denies hemoptysis or leg swelling.  She denies recent surgery or immobilization.      Hospital Course:  The patient is a 73 y.o. female who presented to [facility] with COPD exacerbation/diabetes/CAD.      COPD exacerbation/acute hypoxic respiratory failure.. Patient started on duo nebs, Solu-Medrol, Pulmicort, and Mucinex.  " Chest physiotherapy.  BiPAP PRN.  Patient to continue continue oxygen.  He commits was added for 3 days  Chest x-ray-chronic lung changes, no acute cardiopulmonary process.  Leukocytosis worsening secondary to steroid.  Patient will be discharged with duo nebs, Medrol Dosepak.  Patient follow with PCP in 1 week.  Blood culture shows no growth in 4 days.     Diabetes.  Increase to high sliding scale.  Patient to continue metformin.  Hemoglobin A1c 7.1.    Patient to cont Tradjenta.  Patient states she has insulin at home, which she used before when the sugar is high.  Patient states she knows echo to do.  Discussed the patient sugar probably be high with steroids.     Hyperlipidemia/hypertension/CAD/Hx of CHF.  Patient to continue Lipitor.  Patient to continue Plavix.  Patient to continue Cardizem CD.   Echocardiogram ejection fraction 66%.      Depression.  Patient to continue Lexapro.     Reflux.  Patient to continue Protonix.     Low TSH.  Free T4- normal.     Allergy.  Patient to continue Flonase.  Patient to Vistaril as needed.     Previously quit smoking.     Fairly low degree of suspicion for Pulmonary embolism at this point.  Given her degree of wheezing, this is most likely COPD exacerbation.  Wells Score for PE is only 1.5, making her low risk.  Legs are symmetrical without swelling or edema       Vital signs stable, afebrile.    Patient is from [place].  Patient on chronic oxygen at home at 4 L.  Patient is currently back to baseline at 4 L.  Patient use BiPAP machine at home. Patient go home with home health.  Follow-up with PCP in 1 week.    Condition on Discharge: Stable.    Physical Exam on Discharge:  /66 (BP Location: Left arm, Patient Position: Lying)   Pulse 88   Temp 98 °F (36.7 °C) (Oral)   Resp 16   Ht 152.4 cm (60\")   Wt 61.9 kg (136 lb 8 oz)   SpO2 98%   BMI 26.66 kg/m²   Physical Exam   Constitutional: She is oriented to person, place, and time. She appears well-developed. "   HENT:   Head: Normocephalic and atraumatic.   Eyes: Conjunctivae and EOM are normal. Pupils are equal, round, and reactive to light.   Neck: Neck supple. No JVD present. No thyromegaly present.   Cardiovascular: Normal rate, regular rhythm, normal heart sounds and intact distal pulses. Exam reveals no gallop and no friction rub.   No murmur heard.  Pulmonary/Chest: Effort normal. No respiratory distress. She has wheezes. She has no rales. She exhibits no tenderness.   Abdominal: Soft. Bowel sounds are normal. She exhibits no distension. There is no tenderness. There is no rebound and no guarding.   Musculoskeletal: Normal range of motion. She exhibits no edema, tenderness or deformity.   Lymphadenopathy:     She has no cervical adenopathy.   Neurological: She is alert and oriented to person, place, and time. She displays normal reflexes. No cranial nerve deficit. She exhibits normal muscle tone.   Skin: Skin is warm and dry. No rash noted.   Psychiatric: She has a normal mood and affect. Her behavior is normal. Judgment and thought content normal.   Nursing note and vitals reviewed.    Discharge Disposition: Patient refused rehab placement.  Patient go home with her sister.  Patient will have home health.  Patient continue nebulizer machine as instructed.  Patient go home with [facility] Dosepak.  Patient continue to use home oxygen and BiPAP.  Home or Self Care    Discharge Medications:     Discharge Medications      New Medications      Instructions Start Date   cefdinir 300 MG capsule  Commonly known as:  OMNICEF   300 mg, Oral, 2 Times Daily      guaiFENesin 600 MG 12 hr tablet  Commonly known as:  MUCINEX   1,200 mg, Oral, Every 12 Hours Scheduled      hydrOXYzine 25 MG tablet  Commonly known as:  ATARAX   25 mg, Oral, Every 8 Hours PRN      linagliptin 5 MG tablet tablet  Commonly known as:  TRADJENTA   5 mg, Oral, Daily   Start Date:  7/11/2019     methylPREDNISolone 4 MG tablet  Commonly known as:  MEDROL  (ÁNGEL)   Take as directed on package instructions.         Changes to Medications      Instructions Start Date   ipratropium-albuterol 0.5-2.5 mg/3 ml nebulizer  Commonly known as:  DUO-NEB  What changed:    · when to take this  · reasons to take this   3 mL, Nebulization, 4 Times Daily - RT         Continue These Medications      Instructions Start Date   arformoterol 15 MCG/2ML nebulizer solution  Commonly known as:  BROVANA   15 mcg, Nebulization, 2 Times Daily - RT      atorvastatin 80 MG tablet  Commonly known as:  LIPITOR   80 mg, Oral, Daily      budesonide 0.5 MG/2ML nebulizer solution  Commonly known as:  PULMICORT   0.5 mg, Nebulization, Daily - RT      clopidogrel 75 MG tablet  Commonly known as:  PLAVIX   75 mg, Oral, Daily      diltiaZEM  MG 24 hr capsule  Commonly known as:  CARDIZEM CD   180 mg, Oral, Daily      docusate sodium 100 MG capsule  Commonly known as:  COLACE   100 mg, Oral, 2 Times Daily PRN      escitalopram 10 MG tablet  Commonly known as:  LEXAPRO   10 mg, Oral, Daily      fluticasone 50 MCG/ACT nasal spray  Commonly known as:  FLONASE   2 sprays, Nasal, Daily      metFORMIN 500 MG tablet  Commonly known as:  GLUCOPHAGE   500 mg, Oral, 2 Times Daily With Meals      montelukast 10 MG tablet  Commonly known as:  SINGULAIR   10 mg, Oral, Nightly      pantoprazole 40 MG EC tablet  Commonly known as:  PROTONIX   40 mg, Oral, Daily      tiotropium 18 MCG per inhalation capsule  Commonly known as:  SPIRIVA   1 capsule, Inhalation, Daily - RT         Stop These Medications    CLARITIN 10 MG tablet  Generic drug:  loratadine            Discharge Diet:   Diet Instructions     Advance Diet As Tolerated            Activity at Discharge:   Activity Instructions     Activity as Tolerated            Discharge Care Plan/Instructions:     Follow-up Appointments:   Follow with PCP 1 week.    Gregg Jones MD  07/10/19  10:59 AM    Time: Greater than 30 minutes.

## 2019-07-10 NOTE — PLAN OF CARE
Problem: Patient Care Overview  Goal: Plan of Care Review  Outcome: Ongoing (interventions implemented as appropriate)   07/10/19 1873   Coping/Psychosocial   Plan of Care Reviewed With patient   Plan of Care Review   Progress improving   OTHER   Outcome Summary No c/o of pain voiced this shift, home bipap while sleep, VSS, safety maintained, will continue to monitor.

## 2019-07-10 NOTE — PROGRESS NOTES
Continued Stay Note   Venecia     Patient Name: Jessica Gregg  MRN: [id number]  Today's Date: 7/10/2019    Admit Date: 7/6/2019    Discharge Plan     Row Name 07/10/19 1141       Plan    Plan  Home    Patient/Family in Agreement with Plan  yes    Final Discharge Disposition Code  01 - home or self-care    Final Note  Spoke with pt re: order for HH care to be set up at home. She says she is only going to be with her sister here locally another week and then plans to return home. She declines HH care to be set up at present time saying she will inform MD if she changes her mind. She does have follow-up New patient appt. set up here locally in 1 week.         Discharge Codes    No documentation.       Expected Discharge Date and Time     Expected Discharge Date Expected Discharge Time    Jul 10, 2019             NATHAN Resendez

## 2019-07-11 ENCOUNTER — READMISSION MANAGEMENT (OUTPATIENT)
Dept: CALL CENTER | Facility: HOSPITAL | Age: 74
End: 2019-07-11

## 2019-07-11 LAB
BACTERIA SPEC AEROBE CULT: NORMAL
BACTERIA SPEC AEROBE CULT: NORMAL

## 2019-07-11 NOTE — OUTREACH NOTE
Prep Survey      Responses   Facility patient discharged from?  [facility]   Is patient eligible?  Yes   Discharge diagnosis  AE COPD, CHF, DM, CAD,    Does the patient have one of the following disease processes/diagnoses(primary or secondary)?  COPD/Pneumonia   Does the patient have Home health ordered?  No   What is the Home health agency?   Declined   Is there a DME ordered?  No   Comments regarding appointments  See AVS   Prep survey completed?  Yes          Roya Cameron RN

## 2019-07-11 NOTE — PAYOR COMM NOTE
"[facility]  Laura   [phone]  Or   Fax   [phone]    Ref; [id number]       Jessica Gregg (73 y.o. Female)     Date of Birth Social Security Number Address Home Phone MRN    1945  [address] [facility] [address] [phone] [id number]    Sikh Marital Status          Other        Admission Date Admission Type Admitting Provider Attending Provider Department, Room/Bed    7/6/19 Emergency Gregg Jones MD  [facility] 4C, 493/1    Discharge Date Discharge Disposition Discharge Destination        7/10/2019 Home or Self Care              Attending Provider:  (none)   Allergies:  Latex    Isolation:  None   Infection:  None   Code Status:  Prior    Ht:  152.4 cm (60\")   Wt:  61.9 kg (136 lb 8 oz)    Admission Cmt:  None   Principal Problem:  None                Active Insurance as of 7/6/2019     Primary Coverage     Payor Plan Insurance Group Employer/Plan Group    HUMANA MEDICARE REPLACEMENT HUMANA MEDICARE REPL [id number]     Payor Plan Address Payor Plan Phone Number Payor Plan Fax Number Effective Dates    [address] [address] [phone]  10/1/2018 - None Entered    [facility] [id number]       Subscriber Name Subscriber Birth Date Member ID       JESSICA GREGG 1945 [id number]                 Emergency Contacts      (Rel.) Home Phone Work Phone Mobile Phone    Mariaa Pitts (Sister) -- -- [phone]               Discharge Summary      Gregg Jones MD at 7/10/2019 10:43 AM              [facility] Medicine Services  DISCHARGE SUMMARY       Date of Admission: 7/6/2019  Date of Discharge:  7/10/2019  Primary Care Physician: Provider, No Known    Presenting Problem/History of Present Illness:  Chronic obstructive pulmonary disease with acute exacerbation (CMS/HCC) [J44.1]     Final Discharge Diagnoses:  Active Hospital Problems    Diagnosis   • CHF (congestive heart failure) (CMS/HCC)   • Diabetes " mellitus (CMS/HCC)   • CAD (coronary artery disease)   • Chronic obstructive pulmonary disease with acute exacerbation (CMS/HCC)       Pertinent Test Results:   IMPRESSION: Chest x-ray  1. Probable chronic lung changes.  2. No definite acute cardiopulmonary process    Interpretation Summary echocardiogram    · Left ventricular systolic function is normal.  · Right ventricular cavity is mildly dilated but has normal systolic function.  · No significant valvular pathology.        Echocardiogram Findings     Left Ventricle Left ventricular systolic function is normal. Estimated EF appears to be in the range of 66 - 70%. Normal left ventricular cavity size and wall thickness noted. All left ventricular wall segments contract normally.     Chief Complaint on Day of Discharge: none    History of Present Illness on Day of Discharge:   Ms. Velasquez is a pleasant 73 year old lady with a history of tobacco abuse and COPD.  She came to [place] to visit her sister.  She lives in [place] about 6 hours from here.  Today she began to feel very SOA.  She is coughing and wheezing.  She denies fevers or chills.  She denies leg swelling or weight gain.  She denies hemoptysis or leg swelling.  She denies recent surgery or immobilization.      Hospital Course:  The patient is a 73 y.o. female who presented to [facility] with COPD exacerbation/diabetes/CAD.      COPD exacerbation/acute hypoxic respiratory failure.. Patient started on duo nebs, Solu-Medrol, Pulmicort, and Mucinex.  Chest physiotherapy.  BiPAP PRN.  Patient to continue continue oxygen.  He commits was added for 3 days  Chest x-ray-chronic lung changes, no acute cardiopulmonary process.  Leukocytosis worsening secondary to steroid.  Patient will be discharged with duo nebs, Medrol Dosepak.  Patient follow with PCP in 1 week.  Blood culture shows no growth in  4 days.     Diabetes.  Increase to high sliding scale.  Patient to continue metformin.  Hemoglobin A1c  "7.1.     Patient to cont Tradjenta.  Patient states she has insulin at home, which she used before when the sugar is high.  Patient states she knows echo to do.  Discussed the patient sugar probably be high with steroids.     Hyperlipidemia/hypertension/CAD/Hx of CHF.  Patient to continue Lipitor.  Patient to continue Plavix.  Patient to continue Cardizem CD.   Echocardiogram ejection fraction 66%.      Depression.  Patient to continue Lexapro.     Reflux.  Patient to continue Protonix.     Low TSH.  Free T4- normal.     Allergy.  Patient to continue Flonase.  Patient to Vistaril as needed.     Previously quit smoking.     Fairly low degree of suspicion for Pulmonary embolism at this point.  Given her degree of wheezing, this is most likely COPD exacerbation.  Wells Score for PE is only 1.5, making her low risk.  Legs are symmetrical without swelling or edema       Vital signs stable, afebrile.    Patient is from [place].  Patient on chronic oxygen at home at 4 L.  Patient is currently back to baseline at 4 L.  Patient use BiPAP machine at home. Patient go home with home health.  Follow-up with PCP in 1 week.    Condition on Discharge: Stable.    Physical Exam on Discharge:  /66 (BP Location: Left arm, Patient Position: Lying)   Pulse 88   Temp 98 °F (36.7 °C) (Oral)   Resp 16   Ht 152.4 cm (60\")   Wt 61.9 kg (136 lb 8 oz)   SpO2 98%   BMI 26.66 kg/m²    Physical Exam   Constitutional: She is oriented to person, place, and time. She appears well-developed.   HENT:   Head: Normocephalic and atraumatic.   Eyes: Conjunctivae and EOM are normal. Pupils are equal, round, and reactive to light.   Neck: Neck supple. No JVD present. No thyromegaly present.   Cardiovascular: Normal rate, regular rhythm, normal heart sounds and intact distal pulses. Exam reveals no gallop and no friction rub.   No murmur heard.  Pulmonary/Chest: Effort normal. No respiratory distress. She has wheezes. She has no rales. She " exhibits no tenderness.   Abdominal: Soft. Bowel sounds are normal. She exhibits no distension. There is no tenderness. There is no rebound and no guarding.   Musculoskeletal: Normal range of motion. She exhibits no edema, tenderness or deformity.   Lymphadenopathy:     She has no cervical adenopathy.   Neurological: She is alert and oriented to person, place, and time. She displays normal reflexes. No cranial nerve deficit. She exhibits normal muscle tone.   Skin: Skin is warm and dry. No rash noted.   Psychiatric: She has a normal mood and affect. Her behavior is normal. Judgment and thought content normal.   Nursing note and vitals reviewed.    Discharge Disposition: Patient refused rehab placement.  Patient go home with her sister.  Patient will have home health.  Patient continue nebulizer machine as instructed.  Patient go home with [facility] Dosepak.  Patient continue to use home oxygen and BiPAP.  Home or Self Care    Discharge Medications:     Discharge Medications      New Medications      Instructions Start Date   cefdinir 300 MG capsule  Commonly known as:  OMNICEF   300 mg, Oral, 2 Times Daily      guaiFENesin 600 MG 12 hr tablet  Commonly known as:  MUCINEX   1,200 mg, Oral, Every 12 Hours Scheduled      hydrOXYzine 25 MG tablet  Commonly known as:  ATARAX   25 mg, Oral, Every 8 Hours PRN      linagliptin 5 MG tablet tablet  Commonly known as:  TRADJENTA   5 mg, Oral, Daily   Start Date:  7/11/2019     methylPREDNISolone 4 MG tablet  Commonly known as:  MEDROL (ÁNGEL)   Take as directed on package instructions.         Changes to Medications      Instructions Start Date   ipratropium-albuterol 0.5-2.5 mg/3 ml nebulizer  Commonly known as:  DUO-NEB  What changed:    · when to take this  · reasons to take this   3 mL, Nebulization, 4 Times Daily - RT         Continue These Medications      Instructions Start Date   arformoterol 15 MCG/2ML nebulizer solution  Commonly known as:  BROVANA   15 mcg, Nebulization,  2 Times Daily - RT      atorvastatin 80 MG tablet  Commonly known as:  LIPITOR   80 mg, Oral, Daily      budesonide 0.5 MG/2ML nebulizer solution  Commonly known as:  PULMICORT   0.5 mg, Nebulization, Daily - RT      clopidogrel 75 MG tablet  Commonly known as:  PLAVIX   75 mg, Oral, Daily      diltiaZEM  MG 24 hr capsule  Commonly known as:  CARDIZEM CD   180 mg, Oral, Daily      docusate sodium 100 MG capsule  Commonly known as:  COLACE   100 mg, Oral, 2 Times Daily PRN      escitalopram 10 MG tablet  Commonly known as:  LEXAPRO   10 mg, Oral, Daily      fluticasone 50 MCG/ACT nasal spray  Commonly known as:  FLONASE   2 sprays, Nasal, Daily      metFORMIN 500 MG tablet  Commonly known as:  GLUCOPHAGE   500 mg, Oral, 2 Times Daily With Meals      montelukast 10 MG tablet  Commonly known as:  SINGULAIR   10 mg, Oral, Nightly      pantoprazole 40 MG EC tablet  Commonly known as:  PROTONIX   40 mg, Oral, Daily      tiotropium 18 MCG per inhalation capsule  Commonly known as:  SPIRIVA   1 capsule, Inhalation, Daily - RT         Stop These Medications    CLARITIN 10 MG tablet  Generic drug:  loratadine            Discharge Diet:   Diet Instructions     Advance Diet As Tolerated            Activity at Discharge:   Activity Instructions     Activity as Tolerated            Discharge Care Plan/Instructions:     Follow-up Appointments:   Follow with PCP 1 week.    Gregg Jones MD  07/10/19  10:59 AM    Time: Greater than 30 minutes.        Electronically signed by Gregg Jones MD at 7/10/2019 10:59 AM

## 2019-07-12 ENCOUNTER — READMISSION MANAGEMENT (OUTPATIENT)
Dept: CALL CENTER | Facility: HOSPITAL | Age: 74
End: 2019-07-12

## 2019-07-12 NOTE — OUTREACH NOTE
COPD/PN Week 1 Survey      Responses   Facility patient discharged from?  [place]   Does the patient have one of the following disease processes/diagnoses(primary or secondary)?  COPD/Pneumonia   Is there a successful TCM telephone encounter documented?  No   Was the primary reason for admission:  COPD exacerbation   Week 1 attempt successful?  No   Unsuccessful attempts  Attempt 1          Laura Matias RN

## 2019-07-13 ENCOUNTER — READMISSION MANAGEMENT (OUTPATIENT)
Dept: CALL CENTER | Facility: HOSPITAL | Age: 74
End: 2019-07-13

## 2019-07-13 NOTE — OUTREACH NOTE
COPD/PN Week 1 Survey      Responses   Facility patient discharged from?  [facility]   Does the patient have one of the following disease processes/diagnoses(primary or secondary)?  COPD/Pneumonia   Is there a successful TCM telephone encounter documented?  No   Was the primary reason for admission:  COPD exacerbation   Week 1 attempt successful?  No   Unsuccessful attempts  Attempt 2          Apryl Radnall RN

## 2019-07-19 ENCOUNTER — READMISSION MANAGEMENT (OUTPATIENT)
Dept: CALL CENTER | Facility: HOSPITAL | Age: 74
End: 2019-07-19

## 2019-07-19 NOTE — OUTREACH NOTE
COPD/PN Week 2 Survey      Responses   Facility patient discharged from?  [place]   Does the patient have one of the following disease processes/diagnoses(primary or secondary)?  COPD/Pneumonia   Was the primary reason for admission:  COPD exacerbation   Week 2 attempt successful?  No   Unsuccessful attempts  Attempt 1          Maria Teresa Shell, RN

## 2019-07-20 ENCOUNTER — READMISSION MANAGEMENT (OUTPATIENT)
Dept: CALL CENTER | Facility: HOSPITAL | Age: 74
End: 2019-07-20

## 2019-07-20 NOTE — OUTREACH NOTE
COPD/PN Week 2 Survey      Responses   Facility patient discharged from?  [place]   Does the patient have one of the following disease processes/diagnoses(primary or secondary)?  COPD/Pneumonia   Was the primary reason for admission:  COPD exacerbation   Week 2 attempt successful?  Yes   Call start time  1143   Call end time  1148   Discharge diagnosis  AE COPD, CHF, DM, CAD,    Meds reviewed with patient/caregiver?  Yes   Is the patient having any side effects they believe may be caused by any medication additions or changes?  No   Does the patient have all medications ordered at discharge?  Yes   Is the patient taking all medications as directed (includes completed medication regime)?  Yes   Does the patient have a primary care provider?   Yes   Comments regarding PCP  will see PCP on Tuesday   Has the patient kept scheduled appointments due by today?  N/A   Has home health visited the patient within 72 hours of discharge?  N/A   Psychosocial issues?  No   Did the patient receive a copy of their discharge instructions?  Yes   Nursing interventions  Reviewed instructions with patient   What is the patient's perception of their health status since discharge?  Improving   Nursing Interventions  Nurse provided patient education   Is the patient/caregiver able to teach back the hierarchy of who to call/visit for symptoms/problems? PCP, Specialist, Home health nurse, Urgent Care, ED, 911  Yes   Additional teach back comments  Pt says she is doing well, no questions or concerns at this time.   Is the patient able to teach back COPD zones?  Yes   Nursing interventions  Education provided on various zones   Patient reports what zone on this call?  Green Zone   Green Zone  Reports doing well, Breathing without shortness of breath, Usual activity and exercise level, Usual amount of phlegm/mucus without difficulty coughing up, Sleeping well, Appetite is good   Green Zone interventions:  Continue regular exercise/diet plan,  Take daily medications, Avoid indoor/outdoor triggers, Use oxygen as prescribed   Week 2 call completed?  Yes          Helen Turner RN

## 2019-07-29 ENCOUNTER — READMISSION MANAGEMENT (OUTPATIENT)
Dept: CALL CENTER | Facility: HOSPITAL | Age: 74
End: 2019-07-29

## 2019-07-29 NOTE — OUTREACH NOTE
COPD/PN Week 3 Survey      Responses   Facility patient discharged from?  [place]   Does the patient have one of the following disease processes/diagnoses(primary or secondary)?  COPD/Pneumonia   Was the primary reason for admission:  COPD exacerbation   Week 3 attempt successful?  Yes   Call start time  0817   Call end time  0821   Discharge diagnosis  AE COPD, CHF, DM, CAD,    Meds reviewed with patient/caregiver?  Yes   Is the patient taking all medications as directed (includes completed medication regime)?  Yes   Has the patient kept scheduled appointments due by today?  N/A   Comments  PCP tomorrow 07/30   What is the Home health agency?   Home Health started yesterday.   Home health comments  [facility] is home now and Home Health started yesterday   What is the patient's perception of their health status since discharge?  Improving   Is the patient able to teach back COPD zones?  Yes   Patient reports what zone on this call?  Green Zone   Green Zone  Reports doing well   Week 3 call completed?  Yes          Vale Sorto RN

## 2019-08-05 ENCOUNTER — READMISSION MANAGEMENT (OUTPATIENT)
Dept: CALL CENTER | Facility: HOSPITAL | Age: 74
End: 2019-08-05

## 2019-08-05 NOTE — OUTREACH NOTE
COPD/PN Week 4 Survey      Responses   Facility patient discharged from?  [place]   Does the patient have one of the following disease processes/diagnoses(primary or secondary)?  COPD/Pneumonia   Was the primary reason for admission:  COPD exacerbation   Week 4 attempt successful?  Yes   Call start time  1529   Call end time  1532   Discharge diagnosis  AE COPD, CHF, DM, CAD,    Meds reviewed with patient/caregiver?  Yes   Is the patient having any side effects they believe may be caused by any medication additions or changes?  No   Is the patient taking all medications as directed (includes completed medication regime)?  Yes   Has the patient kept scheduled appointments due by today?  Yes   Is the patient still receiving Home Health Services?  No   Psychosocial issues?  No   What is the patient's perception of their health status since discharge?  Improving   Nursing Interventions  Nurse provided patient education   Are the patient's immunizations up to date?   Yes   Nursing interventions  Educated on importance of maintaining up to date immunizations as advised by provider   Is the patient/caregiver able to teach back the hierarchy of who to call/visit for symptoms/problems? PCP, Specialist, Home health nurse, Urgent Care, ED, 911  Yes   Is the patient able to teach back COPD zones?  Yes   Nursing interventions  Education provided on various zones   Patient reports what zone on this call?  Green Zone   Green Zone  Reports doing well   Green Zone interventions:  Continue regular exercise/diet plan, Take daily medications, Avoid indoor/outdoor triggers, Use oxygen as prescribed   Week 4 call completed?  Yes   Would the patient like one additional call?  No   Graduated  Yes   Did the patient feel the follow up calls were helpful during their recovery period?  Yes   Was the number of calls appropriate?  Yes          Magdalena Arriaga RN

## 2020-05-31 ENCOUNTER — HOSPITAL ENCOUNTER (INPATIENT)
Facility: HOSPITAL | Age: 75
LOS: 6 days | Discharge: HOME | End: 2020-06-06
Attending: FAMILY MEDICINE | Admitting: INTERNAL MEDICINE

## 2020-05-31 ENCOUNTER — APPOINTMENT (OUTPATIENT)
Dept: GENERAL RADIOLOGY | Facility: HOSPITAL | Age: 75
End: 2020-05-31

## 2020-05-31 DIAGNOSIS — Z78.9 DECREASED ACTIVITIES OF DAILY LIVING (ADL): ICD-10-CM

## 2020-05-31 DIAGNOSIS — J44.1 COPD EXACERBATION (HCC): Primary | ICD-10-CM

## 2020-05-31 DIAGNOSIS — Z74.09 IMPAIRED FUNCTIONAL MOBILITY AND ACTIVITY TOLERANCE: ICD-10-CM

## 2020-05-31 PROBLEM — J96.21 ACUTE ON CHRONIC RESPIRATORY FAILURE WITH HYPOXIA AND HYPERCAPNIA (HCC): Status: ACTIVE | Noted: 2020-05-31

## 2020-05-31 PROBLEM — J96.22 ACUTE ON CHRONIC RESPIRATORY FAILURE WITH HYPOXIA AND HYPERCAPNIA (HCC): Status: ACTIVE | Noted: 2020-05-31

## 2020-05-31 LAB
A-A DO2: ABNORMAL
ALBUMIN SERPL-MCNC: 4.4 G/DL (ref 3.5–5.2)
ALBUMIN/GLOB SERPL: 1.5 G/DL
ALP SERPL-CCNC: 95 U/L (ref 39–117)
ALT SERPL W P-5'-P-CCNC: 11 U/L (ref 1–33)
ANION GAP SERPL CALCULATED.3IONS-SCNC: 16 MMOL/L (ref 5–15)
ARTERIAL PATENCY WRIST A: ABNORMAL
ARTERIAL PATENCY WRIST A: POSITIVE
AST SERPL-CCNC: 19 U/L (ref 1–32)
ATMOSPHERIC PRESS: 755 MMHG
ATMOSPHERIC PRESS: 757 MMHG
B PARAPERT DNA SPEC QL NAA+PROBE: NOT DETECTED
B PERT DNA SPEC QL NAA+PROBE: NOT DETECTED
BASE EXCESS BLDA CALC-SCNC: 2.2 MMOL/L (ref 0–2)
BASE EXCESS BLDA CALC-SCNC: 2.3 MMOL/L (ref 0–2)
BASOPHILS # BLD AUTO: 0.08 10*3/MM3 (ref 0–0.2)
BASOPHILS NFR BLD AUTO: 0.7 % (ref 0–1.5)
BDY SITE: ABNORMAL
BDY SITE: ABNORMAL
BILIRUB SERPL-MCNC: 0.2 MG/DL (ref 0.2–1.2)
BODY TEMPERATURE: 37 C
BODY TEMPERATURE: 37 C
BUN BLD-MCNC: 16 MG/DL (ref 8–23)
BUN/CREAT SERPL: 24.6 (ref 7–25)
C PNEUM DNA NPH QL NAA+NON-PROBE: NOT DETECTED
CALCIUM SPEC-SCNC: 9.5 MG/DL (ref 8.6–10.5)
CHLORIDE SERPL-SCNC: 98 MMOL/L (ref 98–107)
CO2 SERPL-SCNC: 27 MMOL/L (ref 22–29)
COHGB MFR BLD: 0.9 % (ref 0–5)
CREAT BLD-MCNC: 0.65 MG/DL (ref 0.57–1)
DEPRECATED RDW RBC AUTO: 46.1 FL (ref 37–54)
EOSINOPHIL # BLD AUTO: 0.48 10*3/MM3 (ref 0–0.4)
EOSINOPHIL NFR BLD AUTO: 4.3 % (ref 0.3–6.2)
EPAP: 8
ERYTHROCYTE [DISTWIDTH] IN BLOOD BY AUTOMATED COUNT: 13.8 % (ref 12.3–15.4)
FLUAV H1 2009 PAND RNA NPH QL NAA+PROBE: NOT DETECTED
FLUAV H1 HA GENE NPH QL NAA+PROBE: NOT DETECTED
FLUAV H3 RNA NPH QL NAA+PROBE: NOT DETECTED
FLUAV SUBTYP SPEC NAA+PROBE: NOT DETECTED
FLUBV RNA ISLT QL NAA+PROBE: NOT DETECTED
GAS FLOW AIRWAY: 4 LPM
GFR SERPL CREATININE-BSD FRML MDRD: 89 ML/MIN/1.73
GLOBULIN UR ELPH-MCNC: 2.9 GM/DL
GLUCOSE BLD-MCNC: 216 MG/DL (ref 65–99)
GLUCOSE BLDC GLUCOMTR-MCNC: 232 MG/DL (ref 70–130)
HADV DNA SPEC NAA+PROBE: NOT DETECTED
HCO3 BLDA-SCNC: 29.7 MMOL/L (ref 20–26)
HCO3 BLDA-SCNC: 30 MMOL/L (ref 20–26)
HCOV 229E RNA SPEC QL NAA+PROBE: NOT DETECTED
HCOV HKU1 RNA SPEC QL NAA+PROBE: NOT DETECTED
HCOV NL63 RNA SPEC QL NAA+PROBE: NOT DETECTED
HCOV OC43 RNA SPEC QL NAA+PROBE: NOT DETECTED
HCT VFR BLD AUTO: 41.2 % (ref 34–46.6)
HCT VFR BLD CALC: 40.6 % (ref 38–51)
HGB BLD-MCNC: 12.4 G/DL (ref 12–15.9)
HGB BLDA-MCNC: 13.2 G/DL (ref 12–16)
HMPV RNA NPH QL NAA+NON-PROBE: NOT DETECTED
HOLD SPECIMEN: NORMAL
HOROWITZ INDEX BLD+IHG-RTO: 35 %
HPIV1 RNA SPEC QL NAA+PROBE: NOT DETECTED
HPIV2 RNA SPEC QL NAA+PROBE: NOT DETECTED
HPIV3 RNA NPH QL NAA+PROBE: NOT DETECTED
HPIV4 P GENE NPH QL NAA+PROBE: NOT DETECTED
IMM GRANULOCYTES # BLD AUTO: 0.03 10*3/MM3 (ref 0–0.05)
IMM GRANULOCYTES NFR BLD AUTO: 0.3 % (ref 0–0.5)
IPAP: 16
LYMPHOCYTES # BLD AUTO: 1.67 10*3/MM3 (ref 0.7–3.1)
LYMPHOCYTES NFR BLD AUTO: 14.9 % (ref 19.6–45.3)
Lab: ABNORMAL
Lab: ABNORMAL
M PNEUMO IGG SER IA-ACNC: NOT DETECTED
MCH RBC QN AUTO: 27.4 PG (ref 26.6–33)
MCHC RBC AUTO-ENTMCNC: 30.1 G/DL (ref 31.5–35.7)
MCV RBC AUTO: 90.9 FL (ref 79–97)
METHGB BLD QL: 0.7 % (ref 0–3)
MODALITY: ABNORMAL
MODALITY: ABNORMAL
MONOCYTES # BLD AUTO: 0.8 10*3/MM3 (ref 0.1–0.9)
MONOCYTES NFR BLD AUTO: 7.1 % (ref 5–12)
NEUTROPHILS # BLD AUTO: 8.16 10*3/MM3 (ref 1.7–7)
NEUTROPHILS NFR BLD AUTO: 72.7 % (ref 42.7–76)
NOTE: ABNORMAL
NRBC BLD AUTO-RTO: 0 /100 WBC (ref 0–0.2)
NT-PROBNP SERPL-MCNC: 231.1 PG/ML (ref 5–900)
OXYHGB MFR BLDV: 97.9 % (ref 94–99)
PCO2 BLDA: 59.2 MM HG (ref 35–45)
PCO2 BLDA: 59.8 MM HG (ref 35–45)
PCO2 TEMP ADJ BLD: ABNORMAL MM[HG]
PH BLDA: 7.31 PH UNITS (ref 7.35–7.45)
PH BLDA: 7.31 PH UNITS (ref 7.35–7.45)
PH, TEMP CORRECTED: ABNORMAL
PLATELET # BLD AUTO: 307 10*3/MM3 (ref 140–450)
PMV BLD AUTO: 9.9 FL (ref 6–12)
PO2 BLDA: 162 MM HG (ref 83–108)
PO2 BLDA: 96.3 MM HG (ref 83–108)
PO2 TEMP ADJ BLD: ABNORMAL MM[HG]
POTASSIUM BLD-SCNC: 4.4 MMOL/L (ref 3.5–5.2)
POTASSIUM BLDA-SCNC: 4.6 MMOL/L (ref 3.5–5.2)
PROT SERPL-MCNC: 7.3 G/DL (ref 6–8.5)
RBC # BLD AUTO: 4.53 10*6/MM3 (ref 3.77–5.28)
RHINOVIRUS RNA SPEC NAA+PROBE: NOT DETECTED
RSV RNA NPH QL NAA+NON-PROBE: NOT DETECTED
SAO2 % BLDCOA: 97 % (ref 94–99)
SAO2 % BLDCOA: 99.5 % (ref 94–99)
SARS-COV-2 RNA RESP QL NAA+PROBE: NOT DETECTED
SET MECH RESP RATE: 18
SODIUM BLD-SCNC: 141 MMOL/L (ref 136–145)
SODIUM BLDA-SCNC: 141 MMOL/L (ref 136–145)
TROPONIN T SERPL-MCNC: <0.01 NG/ML (ref 0–0.03)
VENTILATOR MODE: ABNORMAL
VENTILATOR MODE: ABNORMAL
WBC NRBC COR # BLD: 11.22 10*3/MM3 (ref 3.4–10.8)
WHOLE BLOOD HOLD SPECIMEN: NORMAL
WHOLE BLOOD HOLD SPECIMEN: NORMAL

## 2020-05-31 PROCEDURE — 94799 UNLISTED PULMONARY SVC/PX: CPT

## 2020-05-31 PROCEDURE — 83050 HGB METHEMOGLOBIN QUAN: CPT

## 2020-05-31 PROCEDURE — 36600 WITHDRAWAL OF ARTERIAL BLOOD: CPT

## 2020-05-31 PROCEDURE — 71045 X-RAY EXAM CHEST 1 VIEW: CPT

## 2020-05-31 PROCEDURE — 94640 AIRWAY INHALATION TREATMENT: CPT

## 2020-05-31 PROCEDURE — 25010000002 METHYLPREDNISOLONE PER 125 MG: Performed by: INTERNAL MEDICINE

## 2020-05-31 PROCEDURE — 93010 ELECTROCARDIOGRAM REPORT: CPT | Performed by: INTERNAL MEDICINE

## 2020-05-31 PROCEDURE — 82803 BLOOD GASES ANY COMBINATION: CPT

## 2020-05-31 PROCEDURE — 63710000001 INSULIN LISPRO (HUMAN) PER 5 UNITS: Performed by: INTERNAL MEDICINE

## 2020-05-31 PROCEDURE — 83880 ASSAY OF NATRIURETIC PEPTIDE: CPT | Performed by: FAMILY MEDICINE

## 2020-05-31 PROCEDURE — 25010000002 METHYLPREDNISOLONE PER 125 MG: Performed by: FAMILY MEDICINE

## 2020-05-31 PROCEDURE — 94660 CPAP INITIATION&MGMT: CPT

## 2020-05-31 PROCEDURE — 82375 ASSAY CARBOXYHB QUANT: CPT

## 2020-05-31 PROCEDURE — 85025 COMPLETE CBC W/AUTO DIFF WBC: CPT | Performed by: FAMILY MEDICINE

## 2020-05-31 PROCEDURE — 0100U HC BIOFIRE FILMARRAY RESP PANEL 2: CPT | Performed by: FAMILY MEDICINE

## 2020-05-31 PROCEDURE — 82805 BLOOD GASES W/O2 SATURATION: CPT

## 2020-05-31 PROCEDURE — 80053 COMPREHEN METABOLIC PANEL: CPT | Performed by: FAMILY MEDICINE

## 2020-05-31 PROCEDURE — 82962 GLUCOSE BLOOD TEST: CPT

## 2020-05-31 PROCEDURE — 93005 ELECTROCARDIOGRAM TRACING: CPT | Performed by: FAMILY MEDICINE

## 2020-05-31 PROCEDURE — 99284 EMERGENCY DEPT VISIT MOD MDM: CPT

## 2020-05-31 PROCEDURE — 25010000002 AZITHROMYCIN PER 500 MG: Performed by: FAMILY MEDICINE

## 2020-05-31 PROCEDURE — 87635 SARS-COV-2 COVID-19 AMP PRB: CPT | Performed by: FAMILY MEDICINE

## 2020-05-31 PROCEDURE — 84484 ASSAY OF TROPONIN QUANT: CPT | Performed by: FAMILY MEDICINE

## 2020-05-31 RX ORDER — GUAIFENESIN 600 MG/1
1200 TABLET, EXTENDED RELEASE ORAL EVERY 12 HOURS SCHEDULED
Status: DISCONTINUED | OUTPATIENT
Start: 2020-05-31 | End: 2020-06-06 | Stop reason: HOSPADM

## 2020-05-31 RX ORDER — ESCITALOPRAM OXALATE 10 MG/1
10 TABLET ORAL DAILY
Status: DISCONTINUED | OUTPATIENT
Start: 2020-05-31 | End: 2020-05-31

## 2020-05-31 RX ORDER — HYDROXYZINE HYDROCHLORIDE 25 MG/1
25 TABLET, FILM COATED ORAL EVERY 8 HOURS PRN
Status: DISCONTINUED | OUTPATIENT
Start: 2020-05-31 | End: 2020-06-06 | Stop reason: HOSPADM

## 2020-05-31 RX ORDER — NICOTINE POLACRILEX 4 MG
15 LOZENGE BUCCAL
Status: DISCONTINUED | OUTPATIENT
Start: 2020-05-31 | End: 2020-06-06 | Stop reason: HOSPADM

## 2020-05-31 RX ORDER — ARFORMOTEROL TARTRATE 15 UG/2ML
15 SOLUTION RESPIRATORY (INHALATION)
Status: DISCONTINUED | OUTPATIENT
Start: 2020-05-31 | End: 2020-06-06 | Stop reason: HOSPADM

## 2020-05-31 RX ORDER — METHYLPREDNISOLONE SODIUM SUCCINATE 125 MG/2ML
60 INJECTION, POWDER, LYOPHILIZED, FOR SOLUTION INTRAMUSCULAR; INTRAVENOUS EVERY 6 HOURS
Status: DISCONTINUED | OUTPATIENT
Start: 2020-05-31 | End: 2020-06-01

## 2020-05-31 RX ORDER — CLOPIDOGREL BISULFATE 75 MG/1
75 TABLET ORAL DAILY
Status: DISCONTINUED | OUTPATIENT
Start: 2020-05-31 | End: 2020-06-06 | Stop reason: HOSPADM

## 2020-05-31 RX ORDER — SODIUM CHLORIDE 0.9 % (FLUSH) 0.9 %
10 SYRINGE (ML) INJECTION AS NEEDED
Status: DISCONTINUED | OUTPATIENT
Start: 2020-05-31 | End: 2020-06-06 | Stop reason: HOSPADM

## 2020-05-31 RX ORDER — DILTIAZEM HYDROCHLORIDE 180 MG/1
180 CAPSULE, COATED, EXTENDED RELEASE ORAL DAILY
Status: DISCONTINUED | OUTPATIENT
Start: 2020-05-31 | End: 2020-05-31

## 2020-05-31 RX ORDER — ESCITALOPRAM OXALATE 10 MG/1
10 TABLET ORAL DAILY
Status: DISCONTINUED | OUTPATIENT
Start: 2020-06-01 | End: 2020-06-06 | Stop reason: HOSPADM

## 2020-05-31 RX ORDER — DILTIAZEM HYDROCHLORIDE 180 MG/1
180 CAPSULE, COATED, EXTENDED RELEASE ORAL DAILY
Status: DISCONTINUED | OUTPATIENT
Start: 2020-06-01 | End: 2020-06-01

## 2020-05-31 RX ORDER — DEXTROSE MONOHYDRATE 25 G/50ML
25 INJECTION, SOLUTION INTRAVENOUS
Status: DISCONTINUED | OUTPATIENT
Start: 2020-05-31 | End: 2020-06-06 | Stop reason: HOSPADM

## 2020-05-31 RX ORDER — ATORVASTATIN CALCIUM 40 MG/1
80 TABLET, FILM COATED ORAL DAILY
Status: DISCONTINUED | OUTPATIENT
Start: 2020-05-31 | End: 2020-05-31

## 2020-05-31 RX ORDER — METHYLPREDNISOLONE SODIUM SUCCINATE 125 MG/2ML
125 INJECTION, POWDER, LYOPHILIZED, FOR SOLUTION INTRAMUSCULAR; INTRAVENOUS ONCE
Status: COMPLETED | OUTPATIENT
Start: 2020-05-31 | End: 2020-05-31

## 2020-05-31 RX ORDER — ATORVASTATIN CALCIUM 40 MG/1
80 TABLET, FILM COATED ORAL NIGHTLY
Status: DISCONTINUED | OUTPATIENT
Start: 2020-05-31 | End: 2020-06-06 | Stop reason: HOSPADM

## 2020-05-31 RX ORDER — PANTOPRAZOLE SODIUM 40 MG/1
40 TABLET, DELAYED RELEASE ORAL DAILY
Status: DISCONTINUED | OUTPATIENT
Start: 2020-05-31 | End: 2020-06-06 | Stop reason: HOSPADM

## 2020-05-31 RX ORDER — MONTELUKAST SODIUM 10 MG/1
10 TABLET ORAL NIGHTLY
Status: DISCONTINUED | OUTPATIENT
Start: 2020-05-31 | End: 2020-06-06 | Stop reason: HOSPADM

## 2020-05-31 RX ORDER — FLUTICASONE PROPIONATE 50 MCG
2 SPRAY, SUSPENSION (ML) NASAL DAILY
Status: DISCONTINUED | OUTPATIENT
Start: 2020-05-31 | End: 2020-06-06 | Stop reason: HOSPADM

## 2020-05-31 RX ORDER — IPRATROPIUM BROMIDE AND ALBUTEROL SULFATE 2.5; .5 MG/3ML; MG/3ML
3 SOLUTION RESPIRATORY (INHALATION)
Status: DISCONTINUED | OUTPATIENT
Start: 2020-05-31 | End: 2020-06-02

## 2020-05-31 RX ORDER — DOCUSATE SODIUM 100 MG/1
100 CAPSULE, LIQUID FILLED ORAL 2 TIMES DAILY PRN
Status: DISCONTINUED | OUTPATIENT
Start: 2020-05-31 | End: 2020-06-06 | Stop reason: HOSPADM

## 2020-05-31 RX ORDER — IPRATROPIUM BROMIDE AND ALBUTEROL SULFATE 2.5; .5 MG/3ML; MG/3ML
3 SOLUTION RESPIRATORY (INHALATION) ONCE
Status: COMPLETED | OUTPATIENT
Start: 2020-05-31 | End: 2020-05-31

## 2020-05-31 RX ADMIN — METFORMIN HYDROCHLORIDE 500 MG: 500 TABLET ORAL at 17:17

## 2020-05-31 RX ADMIN — SODIUM CHLORIDE, PRESERVATIVE FREE 10 ML: 5 INJECTION INTRAVENOUS at 12:45

## 2020-05-31 RX ADMIN — METHYLPREDNISOLONE SODIUM SUCCINATE 60 MG: 125 INJECTION, POWDER, FOR SOLUTION INTRAMUSCULAR; INTRAVENOUS at 23:14

## 2020-05-31 RX ADMIN — INSULIN LISPRO 3 UNITS: 100 INJECTION, SOLUTION INTRAVENOUS; SUBCUTANEOUS at 17:10

## 2020-05-31 RX ADMIN — ARFORMOTEROL TARTRATE 15 MCG: 15 SOLUTION RESPIRATORY (INHALATION) at 22:19

## 2020-05-31 RX ADMIN — GUAIFENESIN 1200 MG: 600 TABLET, EXTENDED RELEASE ORAL at 21:05

## 2020-05-31 RX ADMIN — AZITHROMYCIN DIHYDRATE 500 MG: 500 INJECTION, POWDER, LYOPHILIZED, FOR SOLUTION INTRAVENOUS at 14:48

## 2020-05-31 RX ADMIN — IPRATROPIUM BROMIDE AND ALBUTEROL SULFATE 3 ML: 2.5; .5 SOLUTION RESPIRATORY (INHALATION) at 22:19

## 2020-05-31 RX ADMIN — IPRATROPIUM BROMIDE AND ALBUTEROL SULFATE 3 ML: 2.5; .5 SOLUTION RESPIRATORY (INHALATION) at 13:18

## 2020-05-31 RX ADMIN — ATORVASTATIN CALCIUM 80 MG: 40 TABLET, FILM COATED ORAL at 21:05

## 2020-05-31 RX ADMIN — MONTELUKAST 10 MG: 10 TABLET, FILM COATED ORAL at 21:05

## 2020-05-31 RX ADMIN — METHYLPREDNISOLONE SODIUM SUCCINATE 125 MG: 125 INJECTION, POWDER, FOR SOLUTION INTRAMUSCULAR; INTRAVENOUS at 12:43

## 2020-06-01 LAB
GLUCOSE BLDC GLUCOMTR-MCNC: 211 MG/DL (ref 70–130)
GLUCOSE BLDC GLUCOMTR-MCNC: 252 MG/DL (ref 70–130)
GLUCOSE BLDC GLUCOMTR-MCNC: 289 MG/DL (ref 70–130)

## 2020-06-01 PROCEDURE — 82962 GLUCOSE BLOOD TEST: CPT

## 2020-06-01 PROCEDURE — 25010000002 METHYLPREDNISOLONE PER 125 MG: Performed by: NURSE PRACTITIONER

## 2020-06-01 PROCEDURE — 94799 UNLISTED PULMONARY SVC/PX: CPT

## 2020-06-01 PROCEDURE — 25010000002 METHYLPREDNISOLONE PER 125 MG: Performed by: INTERNAL MEDICINE

## 2020-06-01 PROCEDURE — 99406 BEHAV CHNG SMOKING 3-10 MIN: CPT

## 2020-06-01 PROCEDURE — 94660 CPAP INITIATION&MGMT: CPT

## 2020-06-01 PROCEDURE — 63710000001 INSULIN LISPRO (HUMAN) PER 5 UNITS: Performed by: INTERNAL MEDICINE

## 2020-06-01 PROCEDURE — 94664 DEMO&/EVAL PT USE INHALER: CPT

## 2020-06-01 PROCEDURE — 63710000001 INSULIN LISPRO (HUMAN) PER 5 UNITS: Performed by: NURSE PRACTITIONER

## 2020-06-01 RX ORDER — BENZONATATE 100 MG/1
200 CAPSULE ORAL 3 TIMES DAILY PRN
Status: DISCONTINUED | OUTPATIENT
Start: 2020-06-01 | End: 2020-06-06 | Stop reason: HOSPADM

## 2020-06-01 RX ORDER — FERROUS SULFATE 325(65) MG
325 TABLET ORAL 2 TIMES DAILY
COMMUNITY

## 2020-06-01 RX ORDER — DILTIAZEM HYDROCHLORIDE 120 MG/1
120 CAPSULE, COATED, EXTENDED RELEASE ORAL
Status: DISCONTINUED | OUTPATIENT
Start: 2020-06-02 | End: 2020-06-06 | Stop reason: HOSPADM

## 2020-06-01 RX ORDER — BUDESONIDE 0.5 MG/2ML
0.5 INHALANT ORAL
Status: DISCONTINUED | OUTPATIENT
Start: 2020-06-01 | End: 2020-06-06 | Stop reason: HOSPADM

## 2020-06-01 RX ORDER — AZITHROMYCIN 250 MG/1
500 TABLET, FILM COATED ORAL
Status: COMPLETED | OUTPATIENT
Start: 2020-06-01 | End: 2020-06-06

## 2020-06-01 RX ORDER — DEXTROMETHORPHAN POLISTIREX 30 MG/5ML
60 SUSPENSION ORAL EVERY 12 HOURS PRN
Status: DISCONTINUED | OUTPATIENT
Start: 2020-06-01 | End: 2020-06-06 | Stop reason: HOSPADM

## 2020-06-01 RX ORDER — METHYLPREDNISOLONE SODIUM SUCCINATE 125 MG/2ML
60 INJECTION, POWDER, LYOPHILIZED, FOR SOLUTION INTRAMUSCULAR; INTRAVENOUS EVERY 8 HOURS
Status: DISCONTINUED | OUTPATIENT
Start: 2020-06-01 | End: 2020-06-01

## 2020-06-01 RX ORDER — METHYLPREDNISOLONE SODIUM SUCCINATE 125 MG/2ML
60 INJECTION, POWDER, LYOPHILIZED, FOR SOLUTION INTRAMUSCULAR; INTRAVENOUS EVERY 8 HOURS
Status: DISCONTINUED | OUTPATIENT
Start: 2020-06-01 | End: 2020-06-03

## 2020-06-01 RX ADMIN — INSULIN LISPRO 3 UNITS: 100 INJECTION, SOLUTION INTRAVENOUS; SUBCUTANEOUS at 09:14

## 2020-06-01 RX ADMIN — ESCITALOPRAM 10 MG: 10 TABLET, FILM COATED ORAL at 09:13

## 2020-06-01 RX ADMIN — DILTIAZEM HYDROCHLORIDE 180 MG: 180 CAPSULE, COATED, EXTENDED RELEASE ORAL at 09:15

## 2020-06-01 RX ADMIN — ARFORMOTEROL TARTRATE 15 MCG: 15 SOLUTION RESPIRATORY (INHALATION) at 20:01

## 2020-06-01 RX ADMIN — PANTOPRAZOLE SODIUM 40 MG: 40 TABLET, DELAYED RELEASE ORAL at 09:19

## 2020-06-01 RX ADMIN — ARFORMOTEROL TARTRATE 15 MCG: 15 SOLUTION RESPIRATORY (INHALATION) at 09:38

## 2020-06-01 RX ADMIN — LINAGLIPTIN 5 MG: 5 TABLET, FILM COATED ORAL at 12:41

## 2020-06-01 RX ADMIN — METHYLPREDNISOLONE SODIUM SUCCINATE 60 MG: 125 INJECTION, POWDER, FOR SOLUTION INTRAMUSCULAR; INTRAVENOUS at 17:00

## 2020-06-01 RX ADMIN — METFORMIN HYDROCHLORIDE 500 MG: 500 TABLET ORAL at 09:13

## 2020-06-01 RX ADMIN — IPRATROPIUM BROMIDE AND ALBUTEROL SULFATE 3 ML: 2.5; .5 SOLUTION RESPIRATORY (INHALATION) at 11:14

## 2020-06-01 RX ADMIN — IPRATROPIUM BROMIDE AND ALBUTEROL SULFATE 3 ML: 2.5; .5 SOLUTION RESPIRATORY (INHALATION) at 06:30

## 2020-06-01 RX ADMIN — INSULIN LISPRO 6 UNITS: 100 INJECTION, SOLUTION INTRAVENOUS; SUBCUTANEOUS at 17:00

## 2020-06-01 RX ADMIN — METFORMIN HYDROCHLORIDE 500 MG: 500 TABLET ORAL at 17:00

## 2020-06-01 RX ADMIN — INSULIN LISPRO 6 UNITS: 100 INJECTION, SOLUTION INTRAVENOUS; SUBCUTANEOUS at 12:41

## 2020-06-01 RX ADMIN — AZITHROMYCIN 500 MG: 250 TABLET, FILM COATED ORAL at 12:41

## 2020-06-01 RX ADMIN — CLOPIDOGREL 75 MG: 75 TABLET, FILM COATED ORAL at 09:13

## 2020-06-01 RX ADMIN — MONTELUKAST 10 MG: 10 TABLET, FILM COATED ORAL at 21:16

## 2020-06-01 RX ADMIN — FLUTICASONE PROPIONATE 2 SPRAY: 50 SPRAY, METERED NASAL at 09:14

## 2020-06-01 RX ADMIN — BENZONATATE 200 MG: 100 CAPSULE ORAL at 21:16

## 2020-06-01 RX ADMIN — METHYLPREDNISOLONE SODIUM SUCCINATE 60 MG: 125 INJECTION, POWDER, FOR SOLUTION INTRAMUSCULAR; INTRAVENOUS at 09:14

## 2020-06-01 RX ADMIN — GUAIFENESIN 1200 MG: 600 TABLET, EXTENDED RELEASE ORAL at 21:59

## 2020-06-01 RX ADMIN — IPRATROPIUM BROMIDE AND ALBUTEROL SULFATE 3 ML: 2.5; .5 SOLUTION RESPIRATORY (INHALATION) at 15:00

## 2020-06-01 RX ADMIN — IPRATROPIUM BROMIDE AND ALBUTEROL SULFATE 3 ML: 2.5; .5 SOLUTION RESPIRATORY (INHALATION) at 19:54

## 2020-06-01 RX ADMIN — GUAIFENESIN 1200 MG: 600 TABLET, EXTENDED RELEASE ORAL at 09:14

## 2020-06-01 RX ADMIN — ATORVASTATIN CALCIUM 80 MG: 40 TABLET, FILM COATED ORAL at 21:16

## 2020-06-01 RX ADMIN — DEXTROMETHORPHAN 60 MG: 30 SUSPENSION, EXTENDED RELEASE ORAL at 18:07

## 2020-06-01 NOTE — PLAN OF CARE
Problem: Patient Care Overview  Goal: Plan of Care Review  Outcome: Ongoing (interventions implemented as appropriate)  Flowsheets (Taken 6/1/2020 6629)  Progress: improving  Plan of Care Reviewed With: patient  Outcome Summary: AZITHROMYCIN PO AND STEROIDS CONTINUE. FREQUENT NON PRODUCTIVE COUGH. SATS WNL ON 4 LITERS, HOME O2. NO C/O PAIN. CONTINUE TO MONITOR.

## 2020-06-01 NOTE — PROGRESS NOTES
Discharge Planning Assessment  [facility]     Patient Name: Jessica Gregg  MRN: [id number]  Today's Date: 6/1/2020    Admit Date: 5/31/2020    Discharge Needs Assessment     Row Name 06/01/20 1344       Living Environment    Lives With  child(dorina), adult    Name(s) of Who Lives With Patient  Has been visiting sister here from out of town([place])    Current Living Arrangements  home/apartment/condo    Primary Care Provided by  self    Provides Primary Care For  no one, unable/limited ability to care for self    Family Caregiver if Needed  child(dorina), adult;sibling(s)    Quality of Family Relationships  supportive;involved;helpful    Able to Return to Prior Arrangements  yes       Resource/Environmental Concerns    Resource/Environmental Concerns  none    Transportation Concerns  car, none       Transition Planning    Patient/Family Anticipates Transition to  home with family;home with help/services    Patient/Family Anticipated Services at Transition  home health care    Transportation Anticipated  family or friend will provide       Discharge Needs Assessment    Readmission Within the Last 30 Days  no previous admission in last 30 days    Concerns to be Addressed  basic needs    Equipment Currently Used at Home  cane, straight;bipap/cpap;nebulizer;oxygen;commode;shower chair;walker, rolling;wheelchair    Anticipated Changes Related to Illness  none    Equipment Needed After Discharge  none    Outpatient/Agency/Support Group Needs  homecare agency    Discharge Facility/Level of Care Needs  home with home health    Current Discharge Risk  chronically ill        Discharge Plan     Row Name 06/01/20 1346       Plan    Plan  Home Health    Patient/Family in Agreement with Plan  yes    Plan Comments  Spoke with pt to assess for home needs. Pt has been here from out of town visiting with her sister, she feels she will stay here locally for a month or so until able to return home.  Pt normally lives with her daughter and her  family. Pt was followed by [facility] in her area before [phone].  She would like HH transferred and follow at her sister's house at d/c, will need orders for this.  Pt has needed DME here with her, she uses [employer] for O2 and keeps at 4LPM continuously.  Pt has RX coverage/PCP. Pt will need local MD to follow for HH here.  Will follow.     1400- Received call back from [facility] and they are not current and discontinued services on May 1st.          Destination      Coordination has not been started for this encounter.      Durable Medical Equipment      Coordination has not been started for this encounter.      Dialysis/Infusion      Coordination has not been started for this encounter.      Home Medical Care      Coordination has not been started for this encounter.      Therapy      Coordination has not been started for this encounter.      Community Resources      Coordination has not been started for this encounter.          Demographic Summary    No documentation.       Functional Status    No documentation.       Psychosocial    No documentation.       Abuse/Neglect    No documentation.       Legal    No documentation.       Substance Abuse    No documentation.       Patient Forms    No documentation.           NATHAN Resendez

## 2020-06-01 NOTE — PAYOR COMM NOTE
"Jessica Gregg (74 y.o. Female) [id number]    [facility]    yaw phone    Fax        Date of Birth Social Security Number Address Home Phone MRN    1945  [address] [facility] [address] [phone] [id number]    Restorationist Marital Status          Congregational        Admission Date Admission Type Admitting Provider Attending Provider Department, Room/Bed    5/31/20 Emergency Arsh Roman, Arsh Bunch,  [facility] 4B, 446/1    Discharge Date Discharge Disposition Discharge Destination                       Attending Provider:  Arsh Roman DO    Allergies:  Latex    Isolation:  None   Infection:  None   Code Status:  CPR    Ht:  152.4 cm (60\")   Wt:  55.3 kg (122 lb)    Admission Cmt:  None   Principal Problem:  None                Active Insurance as of 5/31/2020     Primary Coverage     Payor Plan Insurance Group Employer/Plan Group    HUMANA MEDICARE REPLACEMENT HUMANA MEDICARE REPLACEMENT [id number]     Payor Plan Address Payor Plan Phone Number Payor Plan Fax Number Effective Dates    [address] [address] [phone]  10/1/2018 - None Entered    [facility] [phone]       Subscriber Name Subscriber Birth Date Member ID       JESSICA GREGG 1945 [id number]                 Emergency Contacts      (Rel.) Home Phone Work Phone Mobile Phone    Mariaa Pitts (Sister) -- -- [phone]               History & Physical      Dakota Oglesby MD at 05/31/20 [address] [place] [address] Hospital Medicine Services  HISTORY AND PHYSICAL    Date of Admission: 5/31/2020  Primary Care Physician: Provider, No Known    Subjective     Chief Complaint: cough    History of Present Illness  I am asked to admit this 74-year-old man with known history of chronic obstructive pulmonary disease on home oxygen normally at 4 L nasal cannula for exacerbation of COPD.  Her respiratory PCR and COVID-19 " testing were negative.  She came in tachycardic with oxygen saturation of 89% and respiratory rate of 24.  She received nebulizer treatment, Solu-Medrol and Zithromax.  Her blood gas showed pH of 7.31 with PCO2 of 60 and PO2 of 162 using 4 L via nasal cannula.  Hyperglycemia (216), her chemistry is otherwise within normal limits.  There is a lot of artifact from EKG.  Otherwise it is  sinus tachycardia.  I could not appreciate for any acute ST-T wave changes.    Chest x-ray showed no obvious consolidation, hyperinflated lung, calcified aortic arch.  Echocardiogram dating back to July 2019 showed normal EF    2 days worsening of cough lasting 10 mins in episode. Non productive in nature.  + rhinorrhea, +postnasal drip, + itchy eyes  For the past week started on OTC antihistamine   Review of Systems     Otherwise complete ROS reviewed and negative except as mentioned in the HPI.    Past Medical History:   Past Medical History:   Diagnosis Date   • CHF (congestive heart failure) (CMS/[facility])    • COPD (chronic obstructive pulmonary disease) (CMS/[facility])    • Diabetes mellitus (CMS/HCC)    • Emphysema lung (CMS/HCC)    • Mediastinal lymphadenopathy    • Stroke (CMS/[facility])    cad with prior stent  Past Surgical History:  Past Surgical History:   Procedure Laterality Date   • CAROTID STENT     • CHOLECYSTECTOMY     • COLONOSCOPY     • ESOPHAGUS SURGERY     • SIGMOIDOSCOPY       Social History:  reports that she has quit smoking. She does not have any smokeless tobacco history on file. She reports that she drinks alcohol. She reports that she does not use drugs.    Family History:  Dad had stroke.    Allergies:  Allergies   Allergen Reactions   • Latex Rash     Medications:  Prior to Admission medications    Medication Sig Start Date End Date Taking? Authorizing Provider   arformoterol (BROVANA) 15 MCG/2ML nebulizer solution Take 15 mcg by nebulization 2 (Two) Times a Day.    Provider, MD Angelica   atorvastatin (LIPITOR) 80 MG  "tablet Take 80 mg by mouth Daily.    Angelica Olson MD   budesonide (PULMICORT) 0.5 MG/2ML nebulizer solution Take 0.5 mg by nebulization Daily.    Angelica Olson MD   clopidogrel (PLAVIX) 75 MG tablet Take 75 mg by mouth Daily.    Angelica Olson MD   diltiaZEM CD (CARDIZEM CD) 180 MG 24 hr capsule Take 180 mg by mouth Daily.    Angelica Olson MD   docusate sodium (COLACE) 100 MG capsule Take 100 mg by mouth 2 (Two) Times a Day As Needed for Constipation.    Angelica Olson MD   escitalopram (LEXAPRO) 10 MG tablet Take 10 mg by mouth Daily.    Angelica Olson MD   fluticasone (FLONASE) 50 MCG/ACT nasal spray 2 sprays into the nostril(s) as directed by provider Daily.    Angelica Olson MD   guaiFENesin (MUCINEX) 600 MG 12 hr tablet Take 2 tablets by mouth Every 12 (Twelve) Hours. 7/10/19   Gregg Jnoes MD   hydrOXYzine (ATARAX) 25 MG tablet Take 1 tablet by mouth Every 8 (Eight) Hours As Needed for Itching, Allergies or Anxiety. 7/10/19   Gregg Jones MD   ipratropium-albuterol (DUO-NEB) 0.5-2.5 mg/3 ml nebulizer Take 3 mL by nebulization 4 (Four) Times a Day. 7/10/19   Gregg Jones MD   linagliptin (TRADJENTA) 5 MG tablet tablet Take 1 tablet by mouth Daily. 7/11/19   Gregg Jones MD   metFORMIN (GLUCOPHAGE) 500 MG tablet Take 500 mg by mouth 2 (Two) Times a Day With Meals.    Angelica Olson MD   methylPREDNISolone (MEDROL, ÁNGEL,) 4 MG tablet Take as directed on package instructions. 7/10/19   Gregg Jones MD   montelukast (SINGULAIR) 10 MG tablet Take 10 mg by mouth Every Night.    Angelica Olson MD   pantoprazole (PROTONIX) 40 MG EC tablet Take 40 mg by mouth Daily.    Angelica Olson MD   tiotropium (SPIRIVA) 18 MCG per inhalation capsule Place 1 capsule into inhaler and inhale Daily.    Angelica Olson MD     Objective     Vital Signs: /69   Pulse 107   Temp 98.5 °F (36.9 °C) (Oral)   Resp 18   Ht 152.4 cm (60\")   " Wt 55.3 kg (122 lb)   SpO2 96%   BMI 23.83 kg/m²    Physical Exam   Constitutional: She appears well-developed. No distress.   HENT:   Head: Normocephalic and atraumatic.   Right Ear: External ear normal.   Left Ear: External ear normal.   Eyes: Pupils are equal, round, and reactive to light. Conjunctivae and EOM are normal. Right eye exhibits no discharge. Left eye exhibits no discharge. No scleral icterus.   Neck: Normal range of motion. Neck supple. No JVD present. No tracheal deviation present. No thyromegaly present.   Cardiovascular: Regular rhythm.   tachycardic   Abdominal: Soft. Bowel sounds are normal.   Skin: Skin is dry. She is not diaphoretic.   Vitals reviewed.          Results Reviewed:  Lab Results (last 24 hours)     Procedure Component Value Units Date/Time    COVID-19,CEPHEID,COR/CHARLINE/PAD IN-HOUSE(OR EMERGENT/ADD-ON),NP SWAB IN TRANSPORT MEDIA 3-4 HR TAT - Swab, Nasopharynx [[id number]]  (Normal) Collected:  05/31/20 1349    Specimen:  Swab from Nasopharynx Updated:  05/31/20 1447     COVID19 Not Detected    Respiratory Panel, PCR - Swab, Nasopharynx [[id number]]  (Normal) Collected:  05/31/20 1349    Specimen:  Swab from Nasopharynx Updated:  05/31/20 1446     ADENOVIRUS, PCR Not Detected     Coronavirus 229E Not Detected     Coronavirus HKU1 Not Detected     Coronavirus NL63 Not Detected     Coronavirus OC43 Not Detected     Human Metapneumovirus Not Detected     Human Rhinovirus/Enterovirus Not Detected     Influenza B PCR Not Detected     Parainfluenza Virus 1 Not Detected     Parainfluenza Virus 2 Not Detected     Parainfluenza Virus 3 Not Detected     Parainfluenza Virus 4 Not Detected     Bordetella pertussis pcr Not Detected     Influenza A H1 2009 PCR Not Detected     Chlamydophila pneumoniae PCR Not Detected     Mycoplasma pneumo by PCR Not Detected     Influenza A PCR Not Detected     Influenza A H3 Not Detected     Influenza A H1 Not Detected     RSV, PCR Not Detected     Bordetella  parapertussis PCR Not Detected    Narrative:       The coronavirus on the RVP is NOT COVID-19 and is NOT indicative of infection with COVID-19.     [facility] Draw [[id number]] Collected:  05/31/20 1025    Specimen:  Blood Updated:  05/31/20 1345    Narrative:       The following orders were created for panel order [facility] Draw.  Procedure                               Abnormality         Status                     ---------                               -----------         ------                     Light Blue Top[[id number]]                                   Final result               Green Top (Gel)[[id number]]                                  Final result               Lavender Top[[id number]]                                     Final result               Red Top[[id number]]                                          Final result               [facility] Blood Culture Cornelius...[[id number]]                      Final result               Gray Top - Ice[[id number]]                                   Final result                 Please view results for these tests on the individual orders.    Light Blue Top [[id number]] Collected:  05/31/20 1236    Specimen:  Blood Updated:  05/31/20 1345     Extra Tube hold for add-on     Comment: Auto resulted       Green Top (Gel) [[id number]] Collected:  05/31/20 1236    Specimen:  Blood Updated:  05/31/20 1345     Extra Tube Hold for add-ons.     Comment: Auto resulted.       Lavender Top [[id number]] Collected:  05/31/20 1236    Specimen:  Blood Updated:  05/31/20 1345     Extra Tube hold for add-on     Comment: Auto resulted       Red Top [[id number]] Collected:  05/31/20 1236    Specimen:  Blood Updated:  05/31/20 1345     Extra Tube Hold for add-ons.     Comment: Auto resulted.       Gray Top - Ice [[id number]] Collected:  05/31/20 1236    Specimen:  Blood Updated:  05/31/20 1345     Extra Tube Hold for add-ons.     Comment: Auto resulted.       Spoonity Blood Culture Bottle Set [[id number]]  Collected:  05/31/20 1025    Specimen:  Blood from Arm, Left Updated:  05/31/20 1315     Extra Tube Hold for add-ons.     Comment: Auto resulted.       Comprehensive Metabolic Panel [[id number]]  (Abnormal) Collected:  05/31/20 1236    Specimen:  Blood Updated:  05/31/20 1313     Glucose 216 mg/dL      BUN 16 mg/dL      Creatinine 0.65 mg/dL      Sodium 141 mmol/L      Potassium 4.4 mmol/L      Chloride 98 mmol/L      CO2 27.0 mmol/L      Calcium 9.5 mg/dL      Total Protein 7.3 g/dL      Albumin 4.40 g/dL      ALT (SGPT) 11 U/L      AST (SGOT) 19 U/L      Alkaline Phosphatase 95 U/L      Total Bilirubin 0.2 mg/dL      eGFR Non African Amer 89 mL/min/1.73      Globulin 2.9 gm/dL      A/G Ratio 1.5 g/dL      BUN/Creatinine Ratio 24.6     Anion Gap 16.0 mmol/L     Narrative:       GFR Normal >60  Chronic Kidney Disease <60  Kidney Failure <15      Troponin [[id number]]  (Normal) Collected:  05/31/20 1236    Specimen:  Blood Updated:  05/31/20 1311     Troponin T <0.010 ng/mL     Narrative:       Troponin T Reference Range:  <= 0.03 ng/mL-   Negative for AMI  >0.03 ng/mL-     Abnormal for myocardial necrosis.  Clinicians would have to utilize clinical acumen, EKG, Troponin and serial changes to determine if it is an Acute Myocardial Infarction or myocardial injury due to an underlying chronic condition.       Results may be falsely decreased if patient taking Biotin.      BNP [[id number]]  (Normal) Collected:  05/31/20 1236    Specimen:  Blood Updated:  05/31/20 1310     proBNP 231.1 pg/mL     Narrative:       Among patients with dyspnea, NT-proBNP is highly sensitive for the detection of acute congestive heart failure. In addition NT-proBNP of <300 pg/ml effectively rules out acute congestive heart failure with 99% negative predictive value.    Results may be falsely decreased if patient taking Biotin.      Blood Gas, Arterial With Co-Ox [[id number]]  (Abnormal) Collected:  05/31/20 1255    Specimen:  Arterial Blood  Updated:  05/31/20 1302     Site Right Brachial     Aneesh's Test N/A     pH, Arterial 7.309 pH units      Comment: 84 Value below reference range        pCO2, Arterial 59.8 mm Hg      Comment: 83 Value above reference range        pO2, Arterial 162.0 mm Hg      Comment: 83 Value above reference range        HCO3, Arterial 30.0 mmol/L      Comment: 83 Value above reference range        Base Excess, Arterial 2.3 mmol/L      Comment: 83 Value above reference range        O2 Saturation, Arterial 99.5 %      Comment: 83 Value above reference range        Hemoglobin, Blood Gas 13.2 g/dL      Hematocrit, Blood Gas 40.6 %      Oxyhemoglobin 97.9 %      Methemoglobin 0.70 %      Carboxyhemoglobin 0.9 %      A-a Gradiant --     Comment: UNABLE TO CALCULATE        Temperature 37.0 C      Sodium, Arterial 141 mmol/L      Potassium, Arterial 4.6 mmol/L      Barometric Pressure for Blood Gas 757 mmHg      Modality Nasal Cannula     Flow Rate 4.0 lpm      Ventilator Mode NA     Note --     Collected by [id number]     Comment: Meter: [id number]     :  [id number]        pH, Temp Corrected --     pCO2, Temperature Corrected --     pO2, Temperature Corrected --    CBC & Differential [[id number]] Collected:  05/31/20 1236    Specimen:  Blood Updated:  05/31/20 1258    Narrative:       The following orders were created for panel order CBC & Differential.  Procedure                               Abnormality         Status                     ---------                               -----------         ------                     CBC Auto Differential[[id number]]        Abnormal            Final result                 Please view results for these tests on the individual orders.    CBC Auto Differential [[id number]]  (Abnormal) Collected:  05/31/20 1236    Specimen:  Blood Updated:  05/31/20 1258     WBC 11.22 10*3/mm3      RBC 4.53 10*6/mm3      Hemoglobin 12.4 g/dL      Hematocrit 41.2 %      MCV 90.9 fL      MCH 27.4 pg      MCHC  30.1 g/dL      RDW 13.8 %      RDW-SD 46.1 fl      MPV 9.9 fL      Platelets 307 10*3/mm3      Neutrophil % 72.7 %      Lymphocyte % 14.9 %      Monocyte % 7.1 %      Eosinophil % 4.3 %      Basophil % 0.7 %      Immature Grans % 0.3 %      Neutrophils, Absolute 8.16 10*3/mm3      Lymphocytes, Absolute 1.67 10*3/mm3      Monocytes, Absolute 0.80 10*3/mm3      Eosinophils, Absolute 0.48 10*3/mm3      Basophils, Absolute 0.08 10*3/mm3      Immature Grans, Absolute 0.03 10*3/mm3      nRBC 0.0 /100 WBC         Imaging Results (Last 24 Hours)     Procedure Component Value Units Date/Time    XR Chest 1 View [[id number]] Collected:  05/31/20 1359     Updated:  05/31/20 1404    Narrative:       EXAMINATION: Chest 1 view 05/31/2020     HISTORY: CHF/COPD protocol     FINDINGS: Today's exam is compared to previous study of 07/06/2019.  Lungs are mildly hyperinflated. There is no evidence of lobar pneumonia  or effusion. There is atheromatous calcification of the aortic arch.       Impression:       . No acute disease.  This report was finalized on 05/31/2020 14:01 by Dr. Beau Bills MD.        I have personally reviewed and interpreted the radiology studies and ECG obtained at time of admission.     Assessment / Plan     Assessment:   There are no active hospital problems to display for this patient.    COPD exacerbation  Acute on chronic hypoxic and hypercarbic respiratory failure  Diabetes mellitus type 2 with hyperglycemia    Plan:      Solu-Medrol  Neb treatment  Antibiotic  Oxygen supplementation to maintain oxygen greater than 89%  Continue supportive care  ssi  meds reviewed  Code Status:  Full code   I discussed the patient's findings and my recommendations with the patient and sister  Estimated length of stay TBD  Patient seen and examined by me on  Dakota Oglesby MD   05/31/20   14:51              Electronically signed by Dakota Oglesby MD at 05/31/20 5623          Emergency Department Notes       Marco John MD at 05/31/20 1416          Subjective   This patient is a 74-year-old female with a known history of COPD.  She presents today with a 2 to 3-day history of worsening shortness of breath symptoms.  She is currently unable to speak in full sentences and feels like she is full of congestion but cannot cough it out.  She denies any fever or other systemic symptoms.  She did not get her usual breathing treatment first thing this morning.  She has been having paroxysmal episodes of severe coughing which leaves her extremely short of breath.          Review of Systems   Respiratory: Positive for cough and shortness of breath.    All other systems reviewed and are negative.      Past Medical History:   Diagnosis Date   • CHF (congestive heart failure) (CMS/HCC)    • COPD (chronic obstructive pulmonary disease) (CMS/HCC)    • Diabetes mellitus (CMS/HCC)    • Emphysema lung (CMS/HCC)    • Mediastinal lymphadenopathy    • Stroke (CMS/HCC)        Allergies   Allergen Reactions   • Latex Rash       Past Surgical History:   Procedure Laterality Date   • CAROTID STENT     • CHOLECYSTECTOMY     • COLONOSCOPY     • ESOPHAGUS SURGERY     • SIGMOIDOSCOPY         No family history on file.    Social History     Socioeconomic History   • Marital status:      Spouse name: Not on file   • Number of children: Not on file   • Years of education: Not on file   • Highest education level: Not on file   Tobacco Use   • Smoking status: Former Smoker   Substance and Sexual Activity   • Alcohol use: Yes     Comment: OCCASSIONAL   • Drug use: No   • Sexual activity: Defer           Objective   Physical Exam   Constitutional: She is oriented to person, place, and time. She appears well-developed and well-nourished.   HENT:   Head: Normocephalic and atraumatic.   Mouth/Throat: Oropharynx is clear and moist.   Eyes: Conjunctivae and EOM are normal.   Neck: Normal range of motion. Neck supple.   Cardiovascular:  Normal rate, regular rhythm, normal heart sounds and intact distal pulses.   Pulmonary/Chest: Accessory muscle usage present. Tachypnea noted. She is in respiratory distress. She has decreased breath sounds. She has wheezes.   Abdominal: Soft. Bowel sounds are normal.   Musculoskeletal: Normal range of motion.   Neurological: She is alert and oriented to person, place, and time.   Skin: Skin is warm and dry. Capillary refill takes less than 2 seconds.   Psychiatric: She has a normal mood and affect. Her behavior is normal. Judgment and thought content normal.   Nursing note and vitals reviewed.      Procedures          ED Course                                           MDM  Number of Diagnoses or Management Options     Amount and/or Complexity of Data Reviewed  Clinical lab tests: reviewed and ordered  Tests in the radiology section of CPT®:  reviewed and ordered  Tests in the medicine section of CPT®:  reviewed and ordered    Patient Progress  Patient progress: stable      Final diagnoses:   COPD exacerbation (CMS/HCC)     The patient's improved after initial treatments in the ED but I believe she deserves admission as she is still tachypneic and unable to speak in full sentences.  I discussed the case with Dr. Ambriz who kindly agreed to the admission.       Marco John MD  05/31/20 1422      Electronically signed by Marco John MD at 05/31/20 1422       Vital Signs (last day)     Date/Time   Temp   Temp src   Pulse   Resp   BP   Patient Position   SpO2    06/01/20 1153   97.9 (36.6)   Axillary   110   21   121/68   Lying   98    06/01/20 1122   --   --   114   23   --   --   93    06/01/20 1114   --   --   113   20   --   --   95    06/01/20 1002   --   --   114   18   --   --   90    06/01/20 0945   --   --   114   18   --   --   --    06/01/20 0938   --   --   114   20   --   --   92    06/01/20 0915   --   --   --   --   --   --   95    06/01/20 0700   98.5 (36.9)   Oral   116   18    128/69   Lying   --    06/01/20 0636   --   --   105   16   --   --   90    06/01/20 0630   --   --   107   18   --   --   94    06/01/20 0625   --   --   106   18   --   --   92    06/01/20 0352   98.2 (36.8)   Oral   105   20   127/59   Lying   96    05/31/20 2351   97.5 (36.4)   Axillary   98   24   121/68   Lying   97    05/31/20 2228   --   --   96   --   --   --   --    05/31/20 2204   --   --   95   23   --   --   96    05/31/20 1947   97.7 (36.5)   Axillary   99   21   136/66   Lying   96    05/31/20 1614   --   --   --   --   --   --   91    05/31/20 1610   --   --   110   --   --   --   92    05/31/20 1601   --   --   --   --   --   --   95    05/31/20 1558   --   --   116   (!) 30   127/61   --   94    05/31/20 1549   --   --   --   (!) 30   --   --   --    05/31/20 1534   97.6 (36.4)   Axillary   (!) 124   22   164/82   Sitting   (!) 88    05/31/20 1500   --   --   105   --   144/62   --   96    05/31/20 1445   --   --   108   21   144/67   --   95    05/31/20 1415   --   --   107   --   144/69   --   96    05/31/20 1345   --   --   108   18   135/77   --   97    05/31/20 1330   --   --   109   --   136/84   --   96    05/31/20 1309   --   --   114   19   --   --   92    05/31/20 1303   --   --   116   19   160/89   --   95    05/31/20 1237   --   --   119   --   136/85   --   95    05/31/20 1217   98.5 (36.9)   Oral   (!) 125   24   123/70   Sitting   (!) 89                Current Facility-Administered Medications   Medication Dose Route Frequency Provider Last Rate Last Dose   • arformoterol (BROVANA) nebulizer solution 15 mcg  15 mcg Nebulization BID - RT Dakota Oglesby MD   15 mcg at 06/01/20 0938   • atorvastatin (LIPITOR) tablet 80 mg  80 mg Oral Nightly Dakota Oglesby MD   80 mg at 05/31/20 2105   • azithromycin (ZITHROMAX) tablet 500 mg  500 mg Oral Q24H Leatha Mobley APRN   500 mg at 06/01/20 1241   • benzonatate (TESSALON) capsule 200 mg  200 mg Oral TID PRN Itz  TERRENCE Aguero       • budesonide (PULMICORT) nebulizer solution 0.5 mg  0.5 mg Nebulization Daily - RT Leatha Mobley APRN       • clopidogrel (PLAVIX) tablet 75 mg  75 mg Oral Daily Dakota Oglesby MD   75 mg at 06/01/20 0913   • dextromethorphan polistirex ER (DELSYM) 30 MG/5ML oral suspension 60 mg  60 mg Oral Q12H PRN Leatha Mobley APRN       • dextrose (D50W) 25 g/ 50mL Intravenous Solution 25 g  25 g Intravenous Q15 Min PRN Dakota Oglesby MD       • dextrose (GLUTOSE) oral gel 15 g  15 g Oral Q15 Min PRN Dakota Oglesby MD       • dilTIAZem CD (CARDIZEM CD) 24 hr capsule 180 mg  180 mg Oral Daily Dakota Oglesby MD   180 mg at 06/01/20 0915   • docusate sodium (COLACE) capsule 100 mg  100 mg Oral BID PRN Dakota Oglesby MD       • escitalopram (LEXAPRO) tablet 10 mg  10 mg Oral Daily Dakota Oglesby MD   10 mg at 06/01/20 0913   • fluticasone (FLONASE) 50 MCG/ACT nasal spray 2 spray  2 spray Nasal Daily Dakota Oglesby MD   2 spray at 06/01/20 0914   • glucagon (human recombinant) (GLUCAGEN DIAGNOSTIC) injection 1 mg  1 mg Subcutaneous Q15 Min PRN Dakota Oglesby MD       • guaiFENesin (MUCINEX) 12 hr tablet 1,200 mg  1,200 mg Oral Q12H Dakota Oglesby MD   1,200 mg at 06/01/20 0914   • hydrOXYzine (ATARAX) tablet 25 mg  25 mg Oral Q8H PRN Dakota Oglesby MD       • insulin lispro (humaLOG) injection 0-9 Units  0-9 Units Subcutaneous TID AC Leatha Mobley APRN   6 Units at 06/01/20 1241   • ipratropium-albuterol (DUO-NEB) nebulizer solution 3 mL  3 mL Nebulization 4x Daily - RT Dakota Oglesby MD   3 mL at 06/01/20 1114   • linagliptin (TRADJENTA) tablet 5 mg  5 mg Oral Daily Dakota Oglesby MD   5 mg at 06/01/20 1241   • metFORMIN (GLUCOPHAGE) tablet 500 mg  500 mg Oral BID With Meals Dakota Oglesby MD   500 mg at 06/01/20 0913   • methylPREDNISolone sodium  succinate (SOLU-Medrol) injection 60 mg  60 mg Intravenous Q8H Leatha Mobley APRN       • montelukast (SINGULAIR) tablet 10 mg  10 mg Oral Nightly Dakota Oglesby MD   10 mg at 05/31/20 2105   • pantoprazole (PROTONIX) EC tablet 40 mg  40 mg Oral Daily Dakota Oglesby MD   40 mg at 06/01/20 0919   • sodium chloride 0.9 % flush 10 mL  10 mL Intravenous PRN Dakota Oglesby MD   10 mL at 05/31/20 1245       Lab Results (last 24 hours)     Procedure Component Value Units Date/Time    POC Glucose Once [[id number]]  (Abnormal) Collected:  06/01/20 1156    Specimen:  Blood Updated:  06/01/20 1208     Glucose 289 mg/dL      Comment: : [id number] [employer] ID: [id number]       POC Glucose Once [[id number]]  (Abnormal) Collected:  06/01/20 0758    Specimen:  Blood Updated:  06/01/20 0809     Glucose 211 mg/dL      Comment: : [id number] [employer] ID: [id number]       Blood Gas, Arterial [[id number]]  (Abnormal) Collected:  05/31/20 1810    Specimen:  Arterial Blood Updated:  05/31/20 1815     Site Right Radial     Aneesh's Test Positive     pH, Arterial 7.309 pH units      Comment: 84 Value below reference range        pCO2, Arterial 59.2 mm Hg      Comment: 83 Value above reference range        pO2, Arterial 96.3 mm Hg      HCO3, Arterial 29.7 mmol/L      Comment: 83 Value above reference range        Base Excess, Arterial 2.2 mmol/L      Comment: 83 Value above reference range        O2 Saturation, Arterial 97.0 %      Temperature 37.0 C      Barometric Pressure for Blood Gas 755 mmHg      Modality BiPap     FIO2 35 %      Ventilator Mode NA     Set Mech Resp Rate 18.0     IPAP 16     Comment: Meter: [id number]     :  [id number]        EPAP 8     Collected by [id number]    POC Glucose Once [[id number]]  (Abnormal) Collected:  05/31/20 1642    Specimen:  Blood Updated:  05/31/20 1653     Glucose 232 mg/dL      Comment: : [id number] ID:  [id number]       COVID-19,CEPHEID,COR/CHARLINE/PAD IN-HOUSE(OR EMERGENT/ADD-ON),NP SWAB IN TRANSPORT MEDIA 3-4 HR TAT - Swab, Nasopharynx [[id number]]  (Normal) Collected:  05/31/20 1349    Specimen:  Swab from Nasopharynx Updated:  05/31/20 1447     COVID19 Not Detected    Respiratory Panel, PCR - Swab, Nasopharynx [[id number]]  (Normal) Collected:  05/31/20 1349    Specimen:  Swab from Nasopharynx Updated:  05/31/20 1446     ADENOVIRUS, PCR Not Detected     Coronavirus 229E Not Detected     Coronavirus HKU1 Not Detected     Coronavirus NL63 Not Detected     Coronavirus OC43 Not Detected     Human Metapneumovirus Not Detected     Human Rhinovirus/Enterovirus Not Detected     Influenza B PCR Not Detected     Parainfluenza Virus 1 Not Detected     Parainfluenza Virus 2 Not Detected     Parainfluenza Virus 3 Not Detected     Parainfluenza Virus 4 Not Detected     Bordetella pertussis pcr Not Detected     Influenza A H1 2009 PCR Not Detected     Chlamydophila pneumoniae PCR Not Detected     Mycoplasma pneumo by PCR Not Detected     Influenza A PCR Not Detected     Influenza A H3 Not Detected     Influenza A H1 Not Detected     RSV, PCR Not Detected     Bordetella parapertussis PCR Not Detected    Narrative:       The coronavirus on the RVP is NOT COVID-19 and is NOT indicative of infection with COVID-19.     [place] Draw [[id number]] Collected:  05/31/20 1025    Specimen:  Blood Updated:  05/31/20 1345    Narrative:       The following orders were created for panel order [place] Draw.  Procedure                               Abnormality         Status                     ---------                               -----------         ------                     Light Blue Top[[id number]]                                   Final result               Green Top (Gel)[[id number]]                                  Final result               Lavender Top[[id number]]                                     Final result               Red  Top[[id number]]                                          Final result               [place] [facility]el[facility]...[[id number]]                      Final result               Gray Top - Ice[[id number]]                                   Final result                 Please view results for these tests on the individual orders.    Light Blue Top [[id number]] Collected:  05/31/20 1236    Specimen:  Blood Updated:  05/31/20 1345     Extra Tube hold for add-on     Comment: Auto resulted       Green Top (Gel) [[id number]] Collected:  05/31/20 1236    Specimen:  Blood Updated:  05/31/20 1345     Extra Tube Hold for add-ons.     Comment: Auto resulted.       Lavender Top [[id number]] Collected:  05/31/20 1236    Specimen:  Blood Updated:  05/31/20 1345     Extra Tube hold for add-on     Comment: Auto resulted       Red Top [[id number]] Collected:  05/31/20 1236    Specimen:  Blood Updated:  05/31/20 1345     Extra Tube Hold for add-ons.     Comment: Auto resulted.       Gray Top - Ice [[id number]] Collected:  05/31/20 1236    Specimen:  Blood Updated:  05/31/20 1345     Extra Tube Hold for add-ons.     Comment: Auto resulted.              Physician Progress Notes (last 24 hours) (Notes from 05/31/20 1321 through 06/01/20 1321)      Leatha Mobley, APRN at 06/01/20 1042              [facility] Medicine Services  INPATIENT PROGRESS NOTE    Length of Stay: 1  Date of Admission: 5/31/2020  Primary Care Physician: Provider, No Known    Subjective   Chief Complaint: Follow-up shortness of breath  HPI   Patient resting in bed.  She states she feels better in comparison to yesterday.  She is still having some shortness of breath and a bothersome cough.  She states she is not able to cough up anything, although she feels as though she needs to sometimes.  She has some soreness in her sides from coughing.  She denies any nausea, vomiting, or diarrhea.  She does wear oxygen chronically at 4 L continuously.       Review of Systems   All pertinent negatives and positives are as above. All other systems have been reviewed and are negative unless otherwise stated.     Objective    Temp:  [97.5 °F (36.4 °C)-98.5 °F (36.9 °C)] 98.5 °F (36.9 °C)  Heart Rate:  [] 114  Resp:  [16-30] 18  BP: (121-164)/(59-89) 128/69  Physical Exam   Constitutional: She is oriented to person, place, and time. She appears well-developed and well-nourished. No distress.   HENT:   Head: Normocephalic and atraumatic.   Neck: Normal range of motion. Neck supple. No JVD present. No tracheal deviation present.   Cardiovascular: Normal rate, regular rhythm and intact distal pulses. Exam reveals no gallop and no friction rub.   Sinus-sinus tach    Pulmonary/Chest: Effort normal. She has wheezes. She has no rales.   Congested cough   Abdominal: Soft. Bowel sounds are normal. She exhibits no distension. There is no tenderness. There is no guarding.   Musculoskeletal: Normal range of motion. She exhibits no edema or tenderness.   Neurological: She is alert and oriented to person, place, and time. No cranial nerve deficit.   Skin: Skin is warm and dry. No rash noted. No erythema.   Psychiatric: She has a normal mood and affect. Her behavior is normal. Judgment and thought content normal.   Vitals reviewed.    Results Review:  I have reviewed the labs, radiology results, and diagnostic studies.    Laboratory Data:   Results from last 7 days   Lab Units 05/31/20  1236   WBC 10*3/mm3 11.22*   HEMOGLOBIN g/dL 12.4   HEMATOCRIT % 41.2   PLATELETS 10*3/mm3 307     Results from last 7 days   Lab Units 05/31/20  1255 05/31/20  1236   SODIUM mmol/L  --  141   SODIUM, ARTERIAL mmol/L 141  --    POTASSIUM mmol/L  --  4.4   CHLORIDE mmol/L  --  98   CO2 mmol/L  --  27.0   BUN mg/dL  --  16   CREATININE mg/dL  --  0.65   CALCIUM mg/dL  --  9.5   BILIRUBIN mg/dL  --  0.2   ALK PHOS U/L  --  95   ALT (SGPT) U/L  --  11   AST (SGOT) U/L  --  19   GLUCOSE mg/dL   --  216*     Radiology Data:   Imaging Results (Last 24 Hours)     Procedure Component Value Units Date/Time    XR Chest 1 View [[id number]] Collected:  05/31/20 1359     Updated:  05/31/20 1404    Narrative:       EXAMINATION: Chest 1 view 05/31/2020     HISTORY: CHF/COPD protocol     FINDINGS: Today's exam is compared to previous study of 07/06/2019.  Lungs are mildly hyperinflated. There is no evidence of lobar pneumonia  or effusion. There is atheromatous calcification of the aortic arch.       Impression:       . No acute disease.  This report was finalized on 05/31/2020 14:01 by Dr. Beau Bills MD.        I have reviewed the patient current medications.     Assessment/Plan     Active Hospital Problems    Diagnosis   • COPD exacerbation (CMS/HCC)   • Acute on chronic respiratory failure with hypoxia and hypercapnia (CMS/HCC)   • CAD (coronary artery disease)   • Diabetes mellitus (CMS/HCC)     Plan:  1.  Patient admitted 5/31/2020 with complaints of shortness of breath/cough.  Chest x-ray showed no acute process.  Patient was admitted for treatment of acute exacerbation of chronic restrictive pulmonary disease.  On arrival to the floor, patient's oxygen saturation was found to be in the low 80s on 4 L.  Patient was placed on BiPAP with improvement of her symptoms.  2.  Continue DuoNeb breathing treatments, and home nebulizers Pulmicort and Brovana.  Continue Mucinex.  Will add flutter valve.  Add Tessalon and Delsym as needed for cough.  3.  Continue IV Solu-Medrol at 60 mg, will decrease to every 8 hours.  Patient states that she is hospitalized frequently for COPD exacerbations.  She is usually discharged on prednisone, and always begins to feel poorly shortly after prednisone is discontinued.  She may need a longer taper at time of discharge, and she may need to speak to her pulmonologist about chronic steroid therapy.  4.  Continue azithromycin, will switch to oral.  Respiratory PCR panel negative.   COVID-19 testing negative.  5.  PT/OT consults.  Activity as tolerated.  6.  Last blood glucoses-232, 211.  Continue oral medications and sliding scale insulin.  Monitor closely while on IV steroid therapy.  7.  Lab holiday in AM    Discharge Planning: I expect the patient to be discharged to home in 2-3 days.    TERRENCE Hansen   06/01/20   10:54      Electronically signed by Leatha Mobley APRN at 06/01/20 1104       pt arrived from ED with  oxygen sat in highs 90's on 4L nc per that nurse report, however patient was found to be low 80's on the 4L during her vitals.  We were unable to reach the Dr right away so we called an RRT to get this patient a cpap to improve her oxygenation. VSS, patient was able to take off her cpap and wear her home oxygen of 4L maintained 89-90 during that and now has cpap back on and doing well.  Patient does have coughing episodes that are intense and her face gets really red,  on tele right now, covid ruled out with rapid swab, patient alert and oriented, no c/o pain, no skin issues, cont to monitor

## 2020-06-01 NOTE — PROGRESS NOTES
[facility] Medicine Services  INPATIENT PROGRESS NOTE    Length of Stay: 1  Date of Admission: 5/31/2020  Primary Care Physician: Provider, No Known    Subjective   Chief Complaint: Follow-up shortness of breath  HPI   Patient resting in bed.  She states she feels better in comparison to yesterday.  She is still having some shortness of breath and a bothersome cough.  She states she is not able to cough up anything, although she feels as though she needs to sometimes.  She has some soreness in her sides from coughing.  She denies any nausea, vomiting, or diarrhea.  She does wear oxygen chronically at 4 L continuously.      Review of Systems   All pertinent negatives and positives are as above. All other systems have been reviewed and are negative unless otherwise stated.     Objective    Temp:  [97.5 °F (36.4 °C)-98.5 °F (36.9 °C)] 98.5 °F (36.9 °C)  Heart Rate:  [] 114  Resp:  [16-30] 18  BP: (121-164)/(59-89) 128/69  Physical Exam   Constitutional: She is oriented to person, place, and time. She appears well-developed and well-nourished. No distress.   HENT:   Head: Normocephalic and atraumatic.   Neck: Normal range of motion. Neck supple. No JVD present. No tracheal deviation present.   Cardiovascular: Normal rate, regular rhythm and intact distal pulses. Exam reveals no gallop and no friction rub.   Sinus-sinus tach    Pulmonary/Chest: Effort normal. She has wheezes. She has no rales.   Congested cough   Abdominal: Soft. Bowel sounds are normal. She exhibits no distension. There is no tenderness. There is no guarding.   Musculoskeletal: Normal range of motion. She exhibits no edema or tenderness.   Neurological: She is alert and oriented to person, place, and time. No cranial nerve deficit.   Skin: Skin is warm and dry. No rash noted. No erythema.   Psychiatric: She has a normal mood and affect. Her behavior is normal. Judgment and thought content normal.   Vitals  reviewed.    Results Review:  I have reviewed the labs, radiology results, and diagnostic studies.    Laboratory Data:   Results from last 7 days   Lab Units 05/31/20  1236   WBC 10*3/mm3 11.22*   HEMOGLOBIN g/dL 12.4   HEMATOCRIT % 41.2   PLATELETS 10*3/mm3 307     Results from last 7 days   Lab Units 05/31/20  1255 05/31/20  1236   SODIUM mmol/L  --  141   SODIUM, ARTERIAL mmol/L 141  --    POTASSIUM mmol/L  --  4.4   CHLORIDE mmol/L  --  98   CO2 mmol/L  --  27.0   BUN mg/dL  --  16   CREATININE mg/dL  --  0.65   CALCIUM mg/dL  --  9.5   BILIRUBIN mg/dL  --  0.2   ALK PHOS U/L  --  95   ALT (SGPT) U/L  --  11   AST (SGOT) U/L  --  19   GLUCOSE mg/dL  --  216*     Radiology Data:   Imaging Results (Last 24 Hours)     Procedure Component Value Units Date/Time    XR Chest 1 View [[id number]] Collected:  05/31/20 1359     Updated:  05/31/20 1404    Narrative:       EXAMINATION: Chest 1 view 05/31/2020     HISTORY: CHF/COPD protocol     FINDINGS: Today's exam is compared to previous study of 07/06/2019.  Lungs are mildly hyperinflated. There is no evidence of lobar pneumonia  or effusion. There is atheromatous calcification of the aortic arch.       Impression:       . No acute disease.  This report was finalized on 05/31/2020 14:01 by Dr. Beau Bills MD.        I have reviewed the patient current medications.     Assessment/Plan     Active Hospital Problems    Diagnosis   • COPD exacerbation (CMS/HCC)   • Acute on chronic respiratory failure with hypoxia and hypercapnia (CMS/HCC)   • CAD (coronary artery disease)   • Diabetes mellitus (CMS/HCC)     Plan:  1.  Patient admitted 5/31/2020 with complaints of shortness of breath/cough.  Chest x-ray showed no acute process.  Patient was admitted for treatment of acute exacerbation of chronic restrictive pulmonary disease.  On arrival to the floor, patient's oxygen saturation was found to be in the low 80s on 4 L.  Patient was placed on BiPAP with improvement of her  symptoms.  2.  Continue DuoNeb breathing treatments, and home nebulizers Pulmicort and Brovana.  Continue Mucinex.  Will add flutter valve.  Add Tessalon and Delsym as needed for cough.  3.  Continue IV Solu-Medrol at 60 mg, will decrease to every 8 hours.  Patient states that she is hospitalized frequently for COPD exacerbations.  She is usually discharged on prednisone, and always begins to feel poorly shortly after prednisone is discontinued.  She may need a longer taper at time of discharge, and she may need to speak to her pulmonologist about chronic steroid therapy.  4.  Continue azithromycin, will switch to oral.  Respiratory PCR panel negative.  COVID-19 testing negative.  5.  PT/OT consults.  Activity as tolerated.  6.  Last blood glucoses-232, 211.  Continue oral medications and sliding scale insulin.  Monitor closely while on IV steroid therapy.  7.  Lab holiday in AM    Discharge Planning: I expect the patient to be discharged to home in 2-3 days.    TERRENCE Hansen   06/01/20   10:54    I personally evaluated and examined the patient in conjunction with TERRENCE Kowalski and agree with the assessment, treatment plan, and disposition of the patient as recorded by her. My history, exam, and further recommendations are:     Patient seen and examined resting comfortably.  Currently being converted from BiPAP back to nasal cannula after a night.  Says she feels better than she did yesterday.  No fevers or chills.  Lungs are diminished on exam but no overt wheezing currently.  There is good air entry.  Not tachycardic.  Abdomen is benign.  Agree with note as above.      Arsh Roman DO  06/01/20  13:26

## 2020-06-01 NOTE — PLAN OF CARE
Problem: Patient Care Overview  Goal: Plan of Care Review  Outcome: Ongoing (interventions implemented as appropriate)  Flowsheets (Taken 6/1/2020 1705)  Progress: no change  Plan of Care Reviewed With: patient  Outcome Summary: Pt with bipap approximately half of night. Up with assist x1, safety maintained. VSS. No signs of respiratory distress. Will continue to monitor.

## 2020-06-02 LAB
GLUCOSE BLDC GLUCOMTR-MCNC: 263 MG/DL (ref 70–130)
GLUCOSE BLDC GLUCOMTR-MCNC: 273 MG/DL (ref 70–130)
GLUCOSE BLDC GLUCOMTR-MCNC: 276 MG/DL (ref 70–130)

## 2020-06-02 PROCEDURE — 97162 PT EVAL MOD COMPLEX 30 MIN: CPT

## 2020-06-02 PROCEDURE — 94799 UNLISTED PULMONARY SVC/PX: CPT

## 2020-06-02 PROCEDURE — 63710000001 INSULIN LISPRO (HUMAN) PER 5 UNITS: Performed by: NURSE PRACTITIONER

## 2020-06-02 PROCEDURE — 94660 CPAP INITIATION&MGMT: CPT

## 2020-06-02 PROCEDURE — 82962 GLUCOSE BLOOD TEST: CPT

## 2020-06-02 PROCEDURE — 25010000002 METHYLPREDNISOLONE PER 125 MG: Performed by: NURSE PRACTITIONER

## 2020-06-02 PROCEDURE — 97166 OT EVAL MOD COMPLEX 45 MIN: CPT

## 2020-06-02 PROCEDURE — 97116 GAIT TRAINING THERAPY: CPT

## 2020-06-02 RX ORDER — LEVALBUTEROL 1.25 MG/.5ML
1.25 SOLUTION, CONCENTRATE RESPIRATORY (INHALATION)
Status: DISCONTINUED | OUTPATIENT
Start: 2020-06-02 | End: 2020-06-06 | Stop reason: HOSPADM

## 2020-06-02 RX ADMIN — INSULIN LISPRO 8 UNITS: 100 INJECTION, SOLUTION INTRAVENOUS; SUBCUTANEOUS at 16:33

## 2020-06-02 RX ADMIN — GUAIFENESIN 1200 MG: 600 TABLET, EXTENDED RELEASE ORAL at 08:19

## 2020-06-02 RX ADMIN — DEXTROMETHORPHAN 60 MG: 30 SUSPENSION, EXTENDED RELEASE ORAL at 12:12

## 2020-06-02 RX ADMIN — ARFORMOTEROL TARTRATE 15 MCG: 15 SOLUTION RESPIRATORY (INHALATION) at 10:58

## 2020-06-02 RX ADMIN — METFORMIN HYDROCHLORIDE 500 MG: 500 TABLET ORAL at 17:00

## 2020-06-02 RX ADMIN — GUAIFENESIN 1200 MG: 600 TABLET, EXTENDED RELEASE ORAL at 20:54

## 2020-06-02 RX ADMIN — ESCITALOPRAM 10 MG: 10 TABLET, FILM COATED ORAL at 08:19

## 2020-06-02 RX ADMIN — FLUTICASONE PROPIONATE 2 SPRAY: 50 SPRAY, METERED NASAL at 08:20

## 2020-06-02 RX ADMIN — BUDESONIDE 0.5 MG: 0.5 INHALANT RESPIRATORY (INHALATION) at 07:04

## 2020-06-02 RX ADMIN — METHYLPREDNISOLONE SODIUM SUCCINATE 60 MG: 125 INJECTION, POWDER, FOR SOLUTION INTRAMUSCULAR; INTRAVENOUS at 00:40

## 2020-06-02 RX ADMIN — IPRATROPIUM BROMIDE AND ALBUTEROL SULFATE 3 ML: 2.5; .5 SOLUTION RESPIRATORY (INHALATION) at 10:57

## 2020-06-02 RX ADMIN — PANTOPRAZOLE SODIUM 40 MG: 40 TABLET, DELAYED RELEASE ORAL at 08:24

## 2020-06-02 RX ADMIN — DILTIAZEM HYDROCHLORIDE 120 MG: 120 CAPSULE, COATED, EXTENDED RELEASE ORAL at 06:18

## 2020-06-02 RX ADMIN — MONTELUKAST 10 MG: 10 TABLET, FILM COATED ORAL at 20:54

## 2020-06-02 RX ADMIN — IPRATROPIUM BROMIDE AND ALBUTEROL SULFATE 3 ML: 2.5; .5 SOLUTION RESPIRATORY (INHALATION) at 07:04

## 2020-06-02 RX ADMIN — INSULIN LISPRO 6 UNITS: 100 INJECTION, SOLUTION INTRAVENOUS; SUBCUTANEOUS at 08:18

## 2020-06-02 RX ADMIN — METFORMIN HYDROCHLORIDE 500 MG: 500 TABLET ORAL at 08:19

## 2020-06-02 RX ADMIN — METHYLPREDNISOLONE SODIUM SUCCINATE 60 MG: 125 INJECTION, POWDER, FOR SOLUTION INTRAMUSCULAR; INTRAVENOUS at 16:22

## 2020-06-02 RX ADMIN — IPRATROPIUM BROMIDE 0.5 MG: 0.5 SOLUTION RESPIRATORY (INHALATION) at 20:19

## 2020-06-02 RX ADMIN — METHYLPREDNISOLONE SODIUM SUCCINATE 60 MG: 125 INJECTION, POWDER, FOR SOLUTION INTRAMUSCULAR; INTRAVENOUS at 08:23

## 2020-06-02 RX ADMIN — IPRATROPIUM BROMIDE 0.5 MG: 0.5 SOLUTION RESPIRATORY (INHALATION) at 15:13

## 2020-06-02 RX ADMIN — ATORVASTATIN CALCIUM 80 MG: 40 TABLET, FILM COATED ORAL at 20:54

## 2020-06-02 RX ADMIN — CLOPIDOGREL 75 MG: 75 TABLET, FILM COATED ORAL at 08:19

## 2020-06-02 RX ADMIN — AZITHROMYCIN 500 MG: 250 TABLET, FILM COATED ORAL at 08:19

## 2020-06-02 RX ADMIN — INSULIN LISPRO 6 UNITS: 100 INJECTION, SOLUTION INTRAVENOUS; SUBCUTANEOUS at 12:08

## 2020-06-02 NOTE — THERAPY EVALUATION
Patient Name: Jessica Gregg  : 1945    MRN: [id number]                              Today's Date: 2020       Admit Date: 2020    Visit Dx:     ICD-10-CM ICD-9-CM   1. COPD exacerbation (CMS/HCC) J44.1 491.21   2. Impaired functional mobility and activity tolerance Z74.09 V49.89     Patient Active Problem List   Diagnosis   • Chronic obstructive pulmonary disease with acute exacerbation (CMS/HCC)   • CHF (congestive heart failure) (CMS/HCC)   • Diabetes mellitus (CMS/HCC)   • CAD (coronary artery disease)   • COPD exacerbation (CMS/HCC)   • Acute on chronic respiratory failure with hypoxia and hypercapnia (CMS/HCC)     Past Medical History:   Diagnosis Date   • CHF (congestive heart failure) (CMS/HCC)    • COPD (chronic obstructive pulmonary disease) (CMS/HCC)    • Diabetes mellitus (CMS/HCC)    • Emphysema lung (CMS/HCC)    • Mediastinal lymphadenopathy    • Stroke (CMS/[facility])      Past Surgical History:   Procedure Laterality Date   • CAROTID STENT     • CHOLECYSTECTOMY     • COLONOSCOPY     • ESOPHAGUS SURGERY     • SIGMOIDOSCOPY       General Information     Row Name 20          PT Evaluation Time/Intention    Document Type  evaluation Cough, SOB. Dx: COPD exac, Acute on chronic hypoxic & hypercarbic resp failure.   -BRITTON     Mode of Treatment  physical therapy  -BRITTON     Row Name 20          General Information    Patient Profile Reviewed?  yes  -BRITTON     Prior Level of Function  independent:;all household mobility;ADL's;dressing;bathing Uses RW/wheelchair for longer distances or while in public. Does not use AD in home, furniture walks at home. Daughter assists with cleaning her hair.  -BRITTON     Existing Precautions/Restrictions  fall;oxygen therapy device and L/min  -BRITTON     Barriers to Rehab  previous functional deficit  -BRITTON     Row Name 20 08          Relationship/Environment    Lives With  sibling(s) sister  -BRITTON     Row Name 20          Resource/Environmental  Concerns    Current Living Arrangements  home/apartment/condo tub shower  -BRITTON     Row Name 06/02/20 0800          Home Main Entrance    Number of Stairs, Main Entrance  one  -BRITTON     Row Name 06/02/20 0800          Cognitive Assessment/Intervention- PT/OT    Orientation Status (Cognition)  oriented x 4  -BRITTON     Row Name 06/02/20 0800          Safety Issues, Functional Mobility    Impairments Affecting Function (Mobility)  balance;endurance/activity tolerance;strength;shortness of breath  -BRITTON       User Key  (r) = Recorded By, (t) = Taken By, (c) = Cosigned By    Initials Name Provider Type    Zach Rodriguez PT DPT Physical Therapist        Mobility     Row Name 06/02/20 0800          Bed Mobility Assessment/Treatment    Comment (Bed Mobility)  Sitting EOB, left in chair  -BRITTON     Row Name 06/02/20 0800          Sit-Stand Transfer    Sit-Stand [place] (Transfers)  contact guard  -BRITTON     Row Name 06/02/20 0800          Gait/Stairs Assessment/Training    [place] Level (Gait)  contact guard  -BRITTON     Distance in Feet (Gait)  20  -BRITTON     Pattern (Gait)  swing-through  -BRITTON     Deviations/Abnormal Patterns (Gait)  [place] decreased;gait speed decreased  -BRITTON     Comment (Gait/Stairs)  SOB with activity, significant cough once sitting in chair.   -BRITTON       User Key  (r) = Recorded By, (t) = Taken By, (c) = Cosigned By    Initials Name Provider Type    Zach Rodriguez PT DPT Physical Therapist        Obj/Interventions     Row Name 06/02/20 0800          General ROM    GENERAL ROM COMMENTS  B LE AROM WFL  -BRITTON     Row Name 06/02/20 0800          MMT (Manual Muscle Testing)    General MMT Comments  B LE functionally 4/5  -BRITTON     Row Name 06/02/20 0800          Static Sitting Balance    Level of [place] (Unsupported Sitting, Static Balance)  independent  -BRITTON     Sitting Position (Unsupported Sitting, Static Balance)  sitting on edge of bed  -BRITTON     Row Name 06/02/20 0800          Dynamic Sitting Balance     Level of [place], Reaches Outside Midline (Sitting, Dynamic Balance)  independent  -BRITTON     Sitting Position, Reaches Outside Midline (Sitting, Dynamic Balance)  sitting on edge of bed  -BRITTON     Row Name 06/02/20 0800          Static Standing Balance    Level of [place] (Supported Standing, Static Balance)  contact guard assist  -BRITTON     Row Name 06/02/20 0800          Dynamic Standing Balance    Level of [place], Reaches Outside Midline (Standing, Dynamic Balance)  contact guard assist  -BRITTON     Row Name 06/02/20 0800          Sensory Assessment/Intervention    Sensory General Assessment  no sensation deficits identified B LE intact to light touch  -BRITTON       User Key  (r) = Recorded By, (t) = Taken By, (c) = Cosigned By    Initials Name Provider Type    Zach Rodriguez, PT DPT Physical Therapist        Goals/Plan     Row Name 06/02/20 0800          Transfer Goal 1 (PT)    Activity/Assistive Device (Transfer Goal 1, PT)  sit-to-stand/stand-to-sit;bed-to-chair/chair-to-bed;cane, straight;walker, rolling  -BRITTON     [place] Level/Cues Needed (Transfer Goal 1, PT)  independent  -BRITTON     Time Frame (Transfer Goal 1, PT)  long term goal (LTG);10 days  -BRITTON     Progress/Outcome (Transfer Goal 1, PT)  goal ongoing  -BRITTON     Row Name 06/02/20 0800          Gait Training Goal 1 (PT)    Activity/Assistive Device (Gait Training Goal 1, PT)  gait (walking locomotion);assistive device use;decrease fall risk;improve balance and speed;increase endurance/gait distance O2 sat WNL, on scheduled O2  -BRITTON     [place] Level (Gait Training Goal 1, PT)  independent  -BRITTON     Distance (Gait Goal 1, PT)  100  -BRITTON     Time Frame (Gait Training Goal 1, PT)  long term goal (LTG);10 days  -BRITTON     Progress/Outcome (Gait Training Goal 1, PT)  goal ongoing  -BRITTON       User Key  (r) = Recorded By, (t) = Taken By, (c) = Cosigned By    Initials Name Provider Type    Zach Rodriguez, PT DPT Physical Therapist        Clinical  Impression     Row Name 06/02/20 0800          Pain Assessment    Additional Documentation  Pain Scale: Numbers Pre/Post-Treatment (Group)  -BRITTON     Row Name 06/02/20 0800          Pain Scale: Numbers Pre/Post-Treatment    Pain Scale: Numbers, Pretreatment  0/10 - no pain  -BRITTON     Pre/Post Treatment Pain Comment  c/o sinus headache, SOB, cough  -BRITTON     Row Name 06/02/20 0800          Plan of Care Review    Plan of Care Reviewed With  patient  -BRITTON     Row Name 06/02/20 0800          Physical Therapy Clinical Impression    Patient/Family Goals Statement (PT Clinical Impression)  go home with sister  -BRITTON     Criteria for Skilled Interventions Met (PT Clinical Impression)  yes;treatment indicated  -BRITTON     Rehab Potential (PT Clinical Summary)  good, to achieve stated therapy goals  -BRITTON     Predicted Duration of Therapy (PT)  until d/c  -BRITTON     Row Name 06/02/20 0800          Vital Signs    Pretreatment Heart Rate (beats/min)  114  -BRITTON     Intratreatment Heart Rate (beats/min)  130  -BRITTON     Posttreatment Heart Rate (beats/min)  115  -BRITTON     Pre SpO2 (%)  96  -BRITTON     O2 Delivery Pre Treatment  supplemental O2  -BRITTON     O2 Delivery Intra Treatment  supplemental O2  -BRITTON     Post SpO2 (%)  91  -BRITTON     O2 Delivery Post Treatment  supplemental O2  -BRITTON     Pre Patient Position  Sitting  -BRITTON     Intra Patient Position  Standing  -BRITTON     Post Patient Position  Sitting  -BRITTON     Row Name 06/02/20 0800          Positioning and Restraints    Pre-Treatment Position  in bed  -BRITTON     Post Treatment Position  chair  -BRITTON     In Chair  sitting;call light within reach;encouraged to call for assist  -BRITTON       User Key  (r) = Recorded By, (t) = Taken By, (c) = Cosigned By    Initials Name Provider Type    Zach Rodriguez, PT DPT Physical Therapist        Outcome Measures     Row Name 06/02/20 0800          How much help from another person do you currently need...    Turning from your back to your side while in flat bed without using  bedrails?  3  -BRITTON     Moving from lying on back to sitting on the side of a flat bed without bedrails?  3  -BRITTON     Moving to and from a bed to a chair (including a wheelchair)?  3  -BRITTON     Standing up from a chair using your arms (e.g., wheelchair, bedside chair)?  3  -BRITTON     Climbing 3-5 steps with a railing?  3  -BRITTON     To walk in hospital room?  3  -BRITTON     AM-PAC 6 Clicks Score (PT)  18  -BRITTON     Row Name 06/02/20 0800          Functional Assessment    Outcome Measure Options  AM-PAC 6 Clicks Basic Mobility (PT)  -BRITTON       User Key  (r) = Recorded By, (t) = Taken By, (c) = Cosigned By    Initials Name Provider Type    Zach Rodriguez, PT DPT Physical Therapist        Physical Therapy Education                 Title: PT OT SLP Therapies (In Progress)     Topic: Physical Therapy (In Progress)     Point: Mobility training (Done)     Description:   Instruct learner(s) on safety and technique for assisting patient out of bed, chair or wheelchair.  Instruct in the proper use of assistive devices, such as walker, crutches, cane or brace.              Patient Friendly Description:   It's important to get you on your feet again, but we need to do so in a way that is safe for you. Falling has serious consequences, and your personal safety is the most important thing of all.        When it's time to get out of bed, one of us or a family member will sit next to you on the bed to give you support.     If your doctor or nurse tells you to use a walker, crutches, a cane, or a brace, be sure you use it every time you get out of bed, even if you think you don't need it.    Learning Progress Summary           Patient Acceptance, CLIFTON [employer] by BRITTON at 6/2/2020 0800    Comment:  Educated pt on progression of PT POC and benefits of activity                   Point: Home exercise program (Not Started)     Description:   Instruct learner(s) on appropriate technique for monitoring, assisting and/or progressing patient with therapeutic  exercises and activities.              Learner Progress:   Not documented in this visit.          Point: Body mechanics (Not Started)     Description:   Instruct learner(s) on proper positioning and spine alignment for patient and/or caregiver during mobility tasks and/or exercises.              Learner Progress:   Not documented in this visit.          Point: Precautions (Not Started)     Description:   Instruct learner(s) on prescribed precautions during mobility and gait tasks              Learner Progress:   Not documented in this visit.                      User Key     Initials Effective Dates Name Provider Type Discipline    BRITTON 08/02/16 -  Zach Yang PT DPT Physical Therapist PT              PT Recommendation and Plan  Planned Therapy Interventions (PT Eval): bed mobility training, transfer training, gait training, balance training, home exercise program, patient/family education, postural re-education, stair training, strengthening  Outcome Summary/Treatment Plan (PT)  Anticipated Discharge Disposition (PT): home with assist, home with home health  Plan of Care Reviewed With: patient  Outcome Summary: PT eval completed. She presents alert and oriented. She needed CGA to stand and ambulate within her room. She demos generalized weakness, SOB and a significant cough post activity. PT will continue to progress gait, balance, and strength. I anticipate she will d.c home with her sister, consider HH if needed.     Time Calculation:   PT Charges     Row Name 06/02/20 0908             Time Calculation    Start Time  0830  -BRITTON      Stop Time  0900 + 10 minutes with chart review. PT with 40 total minutes.   -BRITTON      Time Calculation (min)  30 min  -BRITTON      PT Received On  06/02/20  -BRITTON      PT Goal Re-Cert Due Date  06/12/20  -BRITTON        User Key  (r) = Recorded By, (t) = Taken By, (c) = Cosigned By    Initials Name Provider Type    Zach Rodriguez PT DPT Physical Therapist        Therapy Charges for  Today     Code Description Service Date Service Provider Modifiers Qty    11342115623 HC PT EVAL MOD COMPLEXITY 3 6/2/2020 Zach Yang, PT DPT GP 1          PT G-Codes  Outcome Measure Options: AM-PAC 6 Clicks Basic Mobility (PT)  AM-PAC 6 Clicks Score (PT): 18    Zach Yang, PT DPT  6/2/2020

## 2020-06-02 NOTE — PLAN OF CARE
"  Problem: Patient Care Overview  Goal: Plan of Care Review  Flowsheets (Taken 6/2/2020 8359)  Progress: improving  Plan of Care Reviewed With: patient  Outcome Summary: VSS, conts on 4L/NC with sats in mid 90's, denies pain, continues to have frequent congested cough, has \"coughing spells' that last for a bit before she can stop coughing, but pt states her cough is some better, prn delsym given, cont to be ST with HR in 110's, nebs changed to xopenex, [facility] to come tomorrow 06/03 toa adjust NIV setting, sat up in chair and on side of bed several times today.     "

## 2020-06-02 NOTE — THERAPY EVALUATION
"Acute Care - Occupational Therapy Initial Evaluation  [facility]     Patient Name: Jessica Gregg  : 1945  MRN: [id number]  Today's Date: 2020  Onset of Illness/Injury or Date of Surgery: 20  Date of Referral to OT: 20  Referring Physician: Dr. Oglesby    Admit Date: 2020       ICD-10-CM ICD-9-CM   1. COPD exacerbation (CMS/HCC) J44.1 491.21   2. Impaired functional mobility and activity tolerance Z74.09 V49.89   3. Decreased activities of daily living (ADL) Z78.9 V49.89     Patient Active Problem List   Diagnosis   • Chronic obstructive pulmonary disease with acute exacerbation (CMS/HCC)   • CHF (congestive heart failure) (CMS/HCC)   • Diabetes mellitus (CMS/HCC)   • CAD (coronary artery disease)   • COPD exacerbation (CMS/HCC)   • Acute on chronic respiratory failure with hypoxia and hypercapnia (CMS/HCC)     Past Medical History:   Diagnosis Date   • CHF (congestive heart failure) (CMS/HCC)    • COPD (chronic obstructive pulmonary disease) (CMS/HCC)    • Diabetes mellitus (CMS/HCC)    • Emphysema lung (CMS/HCC)    • Mediastinal lymphadenopathy    • Stroke (CMS/HCC)      Past Surgical History:   Procedure Laterality Date   • CAROTID STENT     • CHOLECYSTECTOMY     • COLONOSCOPY     • ESOPHAGUS SURGERY     • SIGMOIDOSCOPY            OT ASSESSMENT FLOWSHEET (last 12 hours)      Occupational Therapy Evaluation     Row Name 20 0830                   OT Evaluation Time/Intention    Subjective Information  complains of;dyspnea \"cough, sinus pressure\"  -        Document Type  evaluation  -MW        Mode of Treatment  occupational therapy  -           General Information    Patient Profile Reviewed?  yes  -MW        Onset of Illness/Injury or Date of Surgery  20  -MW        Referring Physician  Dr. Oglesby  -        Patient Observations  alert;agree to therapy;cooperative  -MW        Patient/Family Observations  no family present  -MW        General Observations of " Patient  awake and alert up to EOB eating breakfast  -MW        Prior Level of Function  independent:;all household mobility;ADL's RW/wc for longer distances, furniture walks in home  -MW        Equipment Currently Used at Home  shower chair  -MW        Pertinent History of Current Functional Problem  Cough, SOB. Dx: COPD exac, Acute on chronic hypoxic & hypercarbic resp failure.   -MW        Existing Precautions/Restrictions  fall;oxygen therapy device and L/min  -MW        Barriers to Rehab  previous functional deficit  -MW           Relationship/Environment    Lives With  sibling(s)  -MW        Name(s) of Who Lives With Patient  plans to d/c to sister's home, lives with daughter normally  -MW           Resource/Environmental Concerns    Current Living Arrangements  home/apartment/condo  -MW           Home Main Entrance    Number of Stairs, Main Entrance  one  -MW           Cognitive Assessment/Interventions    Additional Documentation  Cognitive Assessment/Intervention (Group)  -           Cognitive Assessment/Intervention- PT/OT    Orientation Status (Cognition)  oriented x 4  -MW        Personal Safety Interventions  fall prevention program maintained;gait belt;muscle strengthening facilitated;nonskid shoes/slippers when out of bed;supervised activity  -MW           Safety Issues, Functional Mobility    Impairments Affecting Function (Mobility)  balance;endurance/activity tolerance;strength;shortness of breath  -MW           Bed Mobility Assessment/Treatment    Comment (Bed Mobility)  sitting EOB, left up in chair  -           Functional Mobility    Functional Mobility- Ind. Level  contact guard assist  -        Functional Mobility- Device  -- HHA on L  -MW        Functional Mobility- Comment  amb within room and around bed to chair with HHAx1  -MW           Transfer Assessment/Treatment    Transfer Assessment/Treatment  sit-stand transfer;stand-sit transfer  -           Sit-Stand Transfer    Sit-Stand  Franklin (Transfers)  contact guard  -           Stand-Sit Transfer    Stand-Sit Franklin (Transfers)  contact guard  -           ADL Assessment/Intervention    BADL Assessment/Intervention  lower body dressing  -           Lower Body Dressing Assessment/Training    Lower Body Dressing Franklin Level  don;doff;shoes/slippers;supervision  -        Lower Body Dressing Position  edge of bed sitting  -           BADL Safety/Performance    Impairments, BADL Safety/Performance  balance;endurance/activity tolerance;strength;shortness of breath  -           General ROM    GENERAL ROM COMMENTS  AROM WFL BUE  -           MMT (Manual Muscle Testing)    General MMT Comments  BUE functionally 4/5  -MW           Motor Assessment/Interventions    Additional Documentation  Balance (Group)  -           Balance    Balance  static sitting balance;static standing balance;dynamic sitting balance;dynamic standing balance  -           Static Sitting Balance    Level of Franklin (Unsupported Sitting, Static Balance)  independent  -        Sitting Position (Unsupported Sitting, Static Balance)  sitting on edge of bed  -           Dynamic Sitting Balance    Level of Franklin, Reaches Outside Midline (Sitting, Dynamic Balance)  independent  -MW        Sitting Position, Reaches Outside Midline (Sitting, Dynamic Balance)  sitting on edge of bed  -           Static Standing Balance    Level of Franklin (Supported Standing, Static Balance)  contact guard assist  -           Dynamic Standing Balance    Level of Franklin, Reaches Outside Midline (Standing, Dynamic Balance)  contact guard assist  -           Sensory Assessment/Intervention    Sensory General Assessment  no sensation deficits identified  -           Positioning and Restraints    Pre-Treatment Position  in bed  -        Post Treatment Position  chair  -        In Chair  sitting;encouraged to call for assist;call light  within reach  -MW           Pain Assessment    Additional Documentation  Pain Scale: Numbers Pre/Post-Treatment (Group)  -MW           Pain Scale: Numbers Pre/Post-Treatment    Pain Scale: Numbers, Pretreatment  0/10 - no pain  -MW        Pain Scale: Numbers, Post-Treatment  0/10 - no pain  -MW           Plan of Care Review    Plan of Care Reviewed With  patient  -MW        Progress  improving  -MW        Outcome Summary  OT eval completed. Pt sitting up EOB eating breakfast, c/o sinus pressure, cough, and SOA. Katy to open containers on breakfast tray, demos mild BUE tremor and decreased  strength. Amb around room with HHAx1, mildly unsteady on her feet. Demos exacerbation of SOA and significant cough following minimal activity in room. OT indicated to address energy conservation, endurance, and strength training to increase independence and safety with ADL and functional mobility. Anticipate d/c home w assist and HH.  -MW           Clinical Impression (OT)    Date of Referral to OT  05/31/20  -MW        OT Diagnosis  decreased ADL  -MW        Prognosis (OT Eval)  good  -MW        Patient/Family Goals Statement (OT Eval)  return home  -MW        Criteria for Skilled Therapeutic Interventions Met (OT Eval)  yes;treatment indicated  -MW        Rehab Potential (OT Eval)  good, to achieve stated therapy goals  -MW        Therapy Frequency (OT Eval)  3 times/wk  -MW        Predicted Duration of Therapy Intervention (Therapy Eval)  until d/c  -MW        Care Plan Review (OT)  evaluation/treatment results reviewed;care plan/treatment goals reviewed;risks/benefits reviewed;current/potential barriers reviewed;patient/other agree to care plan  -MW        Anticipated Discharge Disposition (OT)  home with assist;home with home health  -MW           Vital Signs    Pretreatment Heart Rate (beats/min)  114  -MW        Intratreatment Heart Rate (beats/min)  130  -MW        Posttreatment Heart Rate (beats/min)  115  -MW         Pre SpO2 (%)  96  -MW        O2 Delivery Pre Treatment  supplemental O2  -MW        O2 Delivery Intra Treatment  supplemental O2  -MW        Post SpO2 (%)  91  -MW        O2 Delivery Post Treatment  supplemental O2  -MW        Pre Patient Position  Sitting  -MW        Intra Patient Position  Standing  -MW        Post Patient Position  Sitting  -MW           Planned OT Interventions    Planned Therapy Interventions (OT Eval)  activity tolerance training;BADL retraining;functional balance retraining;occupation/activity based interventions;patient/caregiver education/training;strengthening exercise;transfer/mobility retraining  -MW           OT Goals    Transfer Goal Selection (OT)  transfer, OT goal 1  -MW        Bathing Goal Selection (OT)  bathing, OT goal 1  -MW           Transfer Goal 1 (OT)    Activity/Assistive Device (Transfer Goal 1, OT)  toilet;tub  -MW        Dixie Level/Cues Needed (Transfer Goal 1, OT)  supervision required  -MW        Time Frame (Transfer Goal 1, OT)  long term goal (LTG);by discharge  -MW        Progress/Outcome (Transfer Goal 1, OT)  goal ongoing  -MW           Bathing Goal 1 (OT)    Activity/Assistive Device (Bathing Goal 1, OT)  bathing skills, all;shower chair  -MW        Dixie Level/Cues Needed (Bathing Goal 1, OT)  supervision required  -MW        Time Frame (Bathing Goal 1, OT)  long term goal (LTG);by discharge  -MW        Progress/Outcomes (Bathing Goal 1, OT)  goal ongoing  -MW           Patient Education Goal (OT)    Activity (Patient Education Goal, OT)  demo use of 3 energy conservation strategies to reduce needed rest breaks to </=1 to increase activity tolerance  -MW        Dixie/Cues/Accuracy (Memory Goal 2, OT)  demonstrates adequately;independent;verbalizes understanding  -MW        Time Frame (Patient Education Goal, OT)  long term goal (LTG);by discharge  -MW        Progress/Outcome (Patient Education Goal, OT)  goal ongoing  -MW           Living  Environment    Home Accessibility  tub/shower is not walk in  [facility]          User Key  (r) = Recorded By, (t) = Taken By, (c) = Cosigned By    Initials Name Effective Dates     Tracie Tse OTR/ANTIONE 08/28/18 -          Occupational Therapy Education                 Title: PT OT SLP Therapies (In Progress)     Topic: Occupational Therapy (Done)     Point: ADL training (Done)     Description:   Instruct learner(s) on proper safety adaptation and remediation techniques during self care or transfers.   Instruct in proper use of assistive devices.              Learning Progress Summary           Patient Acceptance, E, VU by  at 6/2/2020 0925                   Point: Home exercise program (Done)     Description:   Instruct learner(s) on appropriate technique for monitoring, assisting and/or progressing therapeutic exercises/activities.              Learning Progress Summary           Patient Acceptance, E, VU by  at 6/2/2020 0925                   Point: Precautions (Done)     Description:   Instruct learner(s) on prescribed precautions during self-care and functional transfers.              Learning Progress Summary           Patient Acceptance, E, VU by  at 6/2/2020 0925                   Point: Body mechanics (Done)     Description:   Instruct learner(s) on proper positioning and spine alignment during self-care, functional mobility activities and/or exercises.              Learning Progress Summary           Patient Acceptance, E, VU by  at 6/2/2020 0925                               User Key     Initials Effective Dates Name Provider Type Discipline     08/28/18 -  Tracie Tse OTR/L Occupational Therapist OT                  OT Recommendation and Plan  Outcome Summary/Treatment Plan (OT)  Anticipated Discharge Disposition (OT): home with assist, home with home health  Planned Therapy Interventions (OT Eval): activity tolerance training, BADL retraining, functional balance retraining,  occupation/activity based interventions, patient/caregiver education/training, strengthening exercise, transfer/mobility retraining  Therapy Frequency (OT Eval): 3 times/wk  Plan of Care Review  Plan of Care Reviewed With: patient  Plan of Care Reviewed With: patient  Outcome Summary: OT eval completed. Pt sitting up EOB eating breakfast, c/o sinus pressure, cough, and SOA. Katy to open containers on breakfast tray, demos mild BUE tremor and decreased  strength. Amb around room with HHAx1, mildly unsteady on her feet. Demos exacerbation of SOA and significant cough following minimal activity in room. OT indicated to address energy conservation, endurance, and strength training to increase independence and safety with ADL and functional mobility. Anticipate d/c home w assist and HH.    Outcome Measures     Row Name 06/02/20 0900             How much help from another is currently needed...    Putting on and taking off regular lower body clothing?  3  -MW      Bathing (including washing, rinsing, and drying)  3  -MW      Toileting (which includes using toilet bed pan or urinal)  3  -MW      Putting on and taking off regular upper body clothing  3  -MW      Taking care of personal grooming (such as brushing teeth)  3  -MW      Eating meals  3  -MW      AM-PAC 6 Clicks Score (OT)  18  -MW         Functional Assessment    Outcome Measure Options  AM-PAC 6 Clicks Daily Activity (OT)  -MW        User Key  (r) = Recorded By, (t) = Taken By, (c) = Cosigned By    Initials Name Provider Type    Tracie Krueger, OTR/L Occupational Therapist          Time Calculation:   Time Calculation- OT     Row Name 06/02/20 0925             Time Calculation- OT    OT Start Time  0830  -MW      OT Stop Time  0915  -MW      OT Time Calculation (min)  45 min  -MW      OT Received On  06/02/20  -MW      OT Goal Re-Cert Due Date  06/12/20  -MW        User Key  (r) = Recorded By, (t) = Taken By, (c) = Cosigned By    Initials Name Provider  Type    MW Tracie Tse, SLADER/L Occupational Therapist        Therapy Charges for Today     Code Description Service Date Service Provider Modifiers Qty    64462156389 HC OT EVAL MOD COMPLEXITY 3 6/2/2020 Tracie Tse, SLADER/L GO 1               JOHANNA Soto/ANTIONE  6/2/2020

## 2020-06-02 NOTE — DISCHARGE PLACEMENT REQUEST
"Mae Lester [phone]  Jessica Gregg (74 y.o. Female)     Date of Birth Social Security Number Address Home Phone MRN    1945  [address] [facility] [address] [phone] [id number]    Restorationist Marital Status          Holiness        Admission Date Admission Type Admitting Provider Attending Provider Department, Room/Bed    20 Emergency Arsh Roman, Arsh Bunch,  [address], 446/1    Discharge Date Discharge Disposition Discharge Destination                       Attending Provider:  Arsh Roman DO    Allergies:  Latex    Isolation:  None   Infection:  None   Code Status:  CPR    Ht:  152.4 cm (60\")   Wt:  58.2 kg (128 lb 3.2 oz)    Admission Cmt:  None   Principal Problem:  None                Active Insurance as of 2020     Primary Coverage     Payor Plan Insurance Group Employer/Plan Group    HUMANA MEDICARE REPLACEMENT HUMANA MEDICARE REPLACEMENT [id number]     Payor Plan Address Payor Plan Phone Number Payor Plan Fax Number Effective Dates    [address] [address] [phone]  10/1/2018 - None Entered    [facility] [address]       Subscriber Name Subscriber Birth Date Member ID       JESSICA GREGG 1945 [id number]                 Emergency Contacts      (Rel.) Home Phone Work Phone Mobile Phone    Mariaa Pitts (Sister) -- -- [phone]        [address] [address]  Phone:  [phone]  Fax:          Patient:     Jessica Gregg MRN:  [id number]   [address] [facility] [address] :  1945  SSN:    Phone: [phone] Sex:  F      INSURANCE PAYOR PLAN GROUP # SUBSCRIBER ID   Primary:    HUMANA MEDICARE REPLACEMENT [id number]   Admitting Diagnosis: COPD exacerbation (CMS/HCC) [J44.1]  Order Date:  2020         Case Management  Consult       (Order ID: [id number])     Diagnosis:         Priority:  Routine Expected Date:  "  Expiration Date:        Interval:  Once Count:    Comments: NEED TO TITRATE NIV SETTING TO PATIENT COMFORT.  Reason for Consult? REFERRAL TO [facility] FOR CHANGE TO HOME TRILOGY SETTING     Specimen Type:   Specimen Source:   Specimen Taken Date:   Specimen Taken Time:                   Verbal Order Mode: Verbal with readback   Authorizing Provider: Arsh Roman DO  Authorizing Provider's NPI: [id number]     Order Entered By: Fletcher, Merlina A, RN 6/2/2020  1:59 PM     Electronically signed by:

## 2020-06-02 NOTE — PLAN OF CARE
Problem: Patient Care Overview  Goal: Plan of Care Review  Outcome: Ongoing (interventions implemented as appropriate)  Flowsheets (Taken 6/2/2020 0830)  Progress: improving  Plan of Care Reviewed With: patient  Outcome Summary: OT eval completed. Pt sitting up EOB eating breakfast, c/o sinus pressure, cough, and SOA. Katy to open containers on breakfast tray, demos mild BUE tremor and decreased  strength. Amb around room with HHAx1, mildly unsteady on her feet. Demos exacerbation of SOA and significant cough following minimal activity in room. OT indicated to address energy conservation, endurance, and strength training to increase independence and safety with ADL and functional mobility. Anticipate d/c home w assist and HH.

## 2020-06-02 NOTE — PROGRESS NOTES
Continued Stay Note   [facility]     Patient Name: Jessica Gregg  MRN: [id number]  Today's Date: 6/2/2020    Admit Date: 5/31/2020    Discharge Plan     Row Name 06/02/20 1400       Plan    Plan  Home Health    Patient/Family in Agreement with Plan  yes    Plan Comments  Pt needing NIV adjusted for comfort.  Informed Nahum from [facility] and faxed order to arrange. They will have someone here late afternoon tomorrow 6/3 to make adjustments.  Will follow.         Discharge Codes    No documentation.             NATHAN Resendez

## 2020-06-02 NOTE — PLAN OF CARE
Problem: Patient Care Overview  Goal: Plan of Care Review  Flowsheets (Taken 6/2/2020 0800)  Outcome Summary: PT eval completed. She presents alert and oriented. She needed CGA to stand and ambulate within her room. She demos generalized weakness, SOB and a significant cough post activity. PT will continue to progress gait, balance, and strength. I anticipate she will d.c home with her sister, consider HH if needed.

## 2020-06-03 LAB
GLUCOSE BLDC GLUCOMTR-MCNC: 289 MG/DL (ref 70–130)
GLUCOSE BLDC GLUCOMTR-MCNC: 305 MG/DL (ref 70–130)
GLUCOSE BLDC GLUCOMTR-MCNC: 323 MG/DL (ref 70–130)

## 2020-06-03 PROCEDURE — 25010000002 METHYLPREDNISOLONE PER 40 MG: Performed by: NURSE PRACTITIONER

## 2020-06-03 PROCEDURE — 63710000001 INSULIN LISPRO (HUMAN) PER 5 UNITS: Performed by: NURSE PRACTITIONER

## 2020-06-03 PROCEDURE — 94799 UNLISTED PULMONARY SVC/PX: CPT

## 2020-06-03 PROCEDURE — 82962 GLUCOSE BLOOD TEST: CPT

## 2020-06-03 PROCEDURE — 97110 THERAPEUTIC EXERCISES: CPT

## 2020-06-03 PROCEDURE — 94660 CPAP INITIATION&MGMT: CPT

## 2020-06-03 PROCEDURE — 97116 GAIT TRAINING THERAPY: CPT

## 2020-06-03 PROCEDURE — 25010000002 METHYLPREDNISOLONE PER 125 MG: Performed by: NURSE PRACTITIONER

## 2020-06-03 RX ORDER — METHYLPREDNISOLONE SODIUM SUCCINATE 40 MG/ML
40 INJECTION, POWDER, LYOPHILIZED, FOR SOLUTION INTRAMUSCULAR; INTRAVENOUS EVERY 8 HOURS
Status: DISCONTINUED | OUTPATIENT
Start: 2020-06-03 | End: 2020-06-04

## 2020-06-03 RX ADMIN — METHYLPREDNISOLONE SODIUM SUCCINATE 60 MG: 125 INJECTION, POWDER, FOR SOLUTION INTRAMUSCULAR; INTRAVENOUS at 01:50

## 2020-06-03 RX ADMIN — ATORVASTATIN CALCIUM 80 MG: 40 TABLET, FILM COATED ORAL at 21:01

## 2020-06-03 RX ADMIN — GUAIFENESIN 1200 MG: 600 TABLET, EXTENDED RELEASE ORAL at 09:01

## 2020-06-03 RX ADMIN — IPRATROPIUM BROMIDE 0.5 MG: 0.5 SOLUTION RESPIRATORY (INHALATION) at 11:41

## 2020-06-03 RX ADMIN — GUAIFENESIN 1200 MG: 600 TABLET, EXTENDED RELEASE ORAL at 21:01

## 2020-06-03 RX ADMIN — IPRATROPIUM BROMIDE 0.5 MG: 0.5 SOLUTION RESPIRATORY (INHALATION) at 07:47

## 2020-06-03 RX ADMIN — MONTELUKAST 10 MG: 10 TABLET, FILM COATED ORAL at 21:01

## 2020-06-03 RX ADMIN — INSULIN LISPRO 10 UNITS: 100 INJECTION, SOLUTION INTRAVENOUS; SUBCUTANEOUS at 11:58

## 2020-06-03 RX ADMIN — SODIUM CHLORIDE, PRESERVATIVE FREE 10 ML: 5 INJECTION INTRAVENOUS at 09:01

## 2020-06-03 RX ADMIN — DEXTROMETHORPHAN 60 MG: 30 SUSPENSION, EXTENDED RELEASE ORAL at 09:01

## 2020-06-03 RX ADMIN — CLOPIDOGREL 75 MG: 75 TABLET, FILM COATED ORAL at 09:01

## 2020-06-03 RX ADMIN — METHYLPREDNISOLONE SODIUM SUCCINATE 40 MG: 40 INJECTION, POWDER, FOR SOLUTION INTRAMUSCULAR; INTRAVENOUS at 17:16

## 2020-06-03 RX ADMIN — INSULIN LISPRO 8 UNITS: 100 INJECTION, SOLUTION INTRAVENOUS; SUBCUTANEOUS at 08:59

## 2020-06-03 RX ADMIN — ARFORMOTEROL TARTRATE 15 MCG: 15 SOLUTION RESPIRATORY (INHALATION) at 19:45

## 2020-06-03 RX ADMIN — PANTOPRAZOLE SODIUM 40 MG: 40 TABLET, DELAYED RELEASE ORAL at 09:00

## 2020-06-03 RX ADMIN — FLUTICASONE PROPIONATE 2 SPRAY: 50 SPRAY, METERED NASAL at 09:00

## 2020-06-03 RX ADMIN — IPRATROPIUM BROMIDE 0.5 MG: 0.5 SOLUTION RESPIRATORY (INHALATION) at 19:44

## 2020-06-03 RX ADMIN — ESCITALOPRAM 10 MG: 10 TABLET, FILM COATED ORAL at 09:00

## 2020-06-03 RX ADMIN — METFORMIN HYDROCHLORIDE 500 MG: 500 TABLET ORAL at 17:16

## 2020-06-03 RX ADMIN — BENZONATATE 200 MG: 100 CAPSULE ORAL at 21:02

## 2020-06-03 RX ADMIN — AZITHROMYCIN 500 MG: 250 TABLET, FILM COATED ORAL at 09:00

## 2020-06-03 RX ADMIN — IPRATROPIUM BROMIDE 0.5 MG: 0.5 SOLUTION RESPIRATORY (INHALATION) at 15:48

## 2020-06-03 RX ADMIN — DOCUSATE SODIUM 100 MG: 100 CAPSULE ORAL at 09:52

## 2020-06-03 RX ADMIN — SODIUM CHLORIDE, PRESERVATIVE FREE 10 ML: 5 INJECTION INTRAVENOUS at 21:01

## 2020-06-03 RX ADMIN — DILTIAZEM HYDROCHLORIDE 120 MG: 120 CAPSULE, COATED, EXTENDED RELEASE ORAL at 05:56

## 2020-06-03 RX ADMIN — METHYLPREDNISOLONE SODIUM SUCCINATE 60 MG: 125 INJECTION, POWDER, FOR SOLUTION INTRAMUSCULAR; INTRAVENOUS at 09:02

## 2020-06-03 RX ADMIN — DOCUSATE SODIUM 100 MG: 100 CAPSULE ORAL at 21:53

## 2020-06-03 RX ADMIN — INSULIN LISPRO 16 UNITS: 100 INJECTION, SOLUTION INTRAVENOUS; SUBCUTANEOUS at 17:15

## 2020-06-03 RX ADMIN — LEVALBUTEROL 1.25 MG: 1.25 SOLUTION, CONCENTRATE RESPIRATORY (INHALATION) at 19:44

## 2020-06-03 RX ADMIN — METFORMIN HYDROCHLORIDE 500 MG: 500 TABLET ORAL at 09:01

## 2020-06-03 NOTE — PLAN OF CARE
Problem: Patient Care Overview  Goal: Plan of Care Review  Outcome: Ongoing (interventions implemented as appropriate)  Flowsheets (Taken 6/3/2020 1105)  Progress: improving  Plan of Care Reviewed With: patient  Note:   Pt. Performs ue exs to increase her act. Ran with adl tasks!

## 2020-06-03 NOTE — PLAN OF CARE
"  Problem: Patient Care Overview  Goal: Plan of Care Review  Outcome: Ongoing (interventions implemented as appropriate)  Flowsheets (Taken 6/3/2020 0171)  Progress: improving  Plan of Care Reviewed With: patient  Outcome Summary: Pt reports \"feeling much better\" than on admission. Lung sounds noted to be improving. O2 sats stable on 4L/NC as per home. Up with stand by assist. Will continue to monitor.     "

## 2020-06-03 NOTE — THERAPY TREATMENT NOTE
Acute Care - Occupational Therapy Treatment Note  [facility]     Patient Name: Jessica Gregg  : 1945  MRN: [id number]  Today's Date: 6/3/2020  Onset of Illness/Injury or Date of Surgery: 20  Date of Referral to OT: 20  Referring Physician: Dr. Oglesby    Admit Date: 2020       ICD-10-CM ICD-9-CM   1. COPD exacerbation (CMS/HCC) J44.1 491.21   2. Impaired functional mobility and activity tolerance Z74.09 V49.89   3. Decreased activities of daily living (ADL) Z78.9 V49.89     Patient Active Problem List   Diagnosis   • Chronic obstructive pulmonary disease with acute exacerbation (CMS/HCC)   • CHF (congestive heart failure) (CMS/HCC)   • Diabetes mellitus (CMS/HCC)   • CAD (coronary artery disease)   • COPD exacerbation (CMS/HCC)   • Acute on chronic respiratory failure with hypoxia and hypercapnia (CMS/HCC)     Past Medical History:   Diagnosis Date   • CHF (congestive heart failure) (CMS/HCC)    • COPD (chronic obstructive pulmonary disease) (CMS/HCC)    • Diabetes mellitus (CMS/HCC)    • Emphysema lung (CMS/HCC)    • Mediastinal lymphadenopathy    • Stroke (CMS/HCC)      Past Surgical History:   Procedure Laterality Date   • CAROTID STENT     • CHOLECYSTECTOMY     • COLONOSCOPY     • ESOPHAGUS SURGERY     • SIGMOIDOSCOPY         Therapy Treatment    Rehabilitation Treatment Summary     Row Name 20 1011 20 0801 20 0730       Treatment Time/Intention    Discipline  physical therapy assistant  -BERNIE  physical therapy assistant  -BERNIE  occupational therapy assistant  -CJ    Document Type  therapy note (daily note)  -BERNIE  --  therapy note (daily note)  -    Subjective Information  --  --  complains of;weakness;fatigue  -CJ    Mode of Treatment  --  --  occupational therapy  -CJ    Patient Effort  --  --  good  -CJ    Comment  --  breakfast  -BERNIE  --    Existing Precautions/Restrictions  --  --  fall;oxygen therapy device and L/min  -CJ    Recorded by [BERNIE] Tatiana Raya,  PTA 06/03/20 1018 [BERNIE] Tatiana Raya, PTA 06/03/20 0801 [CJ] Gilberto Benson, RILEY/L 06/03/20 1104    Row Name 06/03/20 0730             Bed Mobility Assessment/Treatment    Comment (Bed Mobility)  fowlers in bed!  -CJ      Recorded by [CJ] Gilberto Benson COTA/L 06/03/20 1104      Row Name 06/03/20 0730             Motor Skills Assessment/Interventions    Additional Documentation  Therapeutic Exercise (Group)  -CJ      Recorded by [CJ] Gilberto Benson RILEY/L 06/03/20 1104      Row Name 06/03/20 0730             Therapeutic Exercise    Upper Extremity (Therapeutic Exercise)  bicep curl, bilateral;wand exercises  -CJ      Upper Extremity Range of Motion (Therapeutic Exercise)  shoulder flexion/extension, bilateral;elbow flexion/extension, bilateral;wrist flexion/extension, bilateral  -CJ      Weight/Resistance (Therapeutic Exercise)  2 pounds;3 pounds  -CJ      Exercise Type (Therapeutic Exercise)  AROM (active range of motion)  -CJ      Position (Therapeutic Exercise)  seated  -CJ      Sets/Reps (Therapeutic Exercise)  2x 20 reps!  -CJ      Expected Outcome (Therapeutic Exercise)  facilitate normal movement patterns;improve functional stability;improve functional tolerance, self-care activity;improve motor control;improve neuromuscular control;improve performance, BADLs;improve performance, fine motor coordination skills;improve performance, gait skills;improve performance, transfer skills;improve postural control;increase active range of motion  -CJ      Recorded by [CJ] Gilberto Benson COTA/L 06/03/20 1104      Row Name 06/03/20 0730             Positioning and Restraints    Pre-Treatment Position  in bed  -CJ      Post Treatment Position  bed  -CJ      In Bed  fowlers;call light within reach;encouraged to call for assist;side rails up x2  -CJ      Recorded by [CJ] Gilberto Benson COTA/L 06/03/20 1104      Row Name 06/03/20 0730             Pain Assessment    Additional Documentation  Pain  Scale 2: Word Pre/Post-Treatment (Group)  -      Recorded by [CJ] Gilberto Benson, RILEY/L 06/03/20 1104      Row Name 06/03/20 0730             Pain Scale: Numbers Pre/Post-Treatment    Pain Scale: Numbers, Pretreatment  0/10 - no pain  -      Pain Scale: Numbers, Post-Treatment  0/10 - no pain  -CJ      Recorded by [CJ] MarcelinoGilberto, RILEY/L 06/03/20 1104      Row Name 06/03/20 0730             Outcome Summary/Treatment Plan (OT)    Daily Summary of Progress (OT)  progress toward functional goals is good  -      Barriers to Overall Progress (OT)  Fall/o2!  -      Plan for Continued Treatment (OT)  continue with ot poc!  -CJ      Recorded by [CJ] Gilberto Benson RILEY/L 06/03/20 1104        User Key  (r) = Recorded By, (t) = Taken By, (c) = Cosigned By    Initials Name Effective Dates Discipline     Gilberto Benson, RILEY/L 08/02/16 -  OT    Tatiana Mack, PREMA 08/02/16 -  PT             Occupational Therapy Education                 Title: PT OT SLP Therapies (In Progress)     Topic: Occupational Therapy (Done)     Point: ADL training (Done)     Description:   Instruct learner(s) on proper safety adaptation and remediation techniques during self care or transfers.   Instruct in proper use of assistive devices.              Learning Progress Summary           Patient Acceptance, E, VU by  at 6/2/2020 0925                   Point: Home exercise program (Done)     Description:   Instruct learner(s) on appropriate technique for monitoring, assisting and/or progressing therapeutic exercises/activities.              Learning Progress Summary           Patient Acceptance, E,TB, VU,DU,NR by  at 6/3/2020 1105    Comment:  Pt. performs ue exs to increase her act. russell!    Acceptance, E, VU by  at 6/2/2020 0925                   Point: Precautions (Done)     Description:   Instruct learner(s) on prescribed precautions during self-care and functional transfers.              Learning Progress  Summary           Patient Acceptance, E,TB, VU,DU,NR by  at 6/3/2020 1105    Comment:  Pt. performs ue exs to increase her act. russell!    Acceptance, E, VU by  at 6/2/2020 0925                   Point: Body mechanics (Done)     Description:   Instruct learner(s) on proper positioning and spine alignment during self-care, functional mobility activities and/or exercises.              Learning Progress Summary           Patient Acceptance, E,TB, VU,DU,NR by  at 6/3/2020 1105    Comment:  Pt. performs ue exs to increase her act. russell!    Acceptance, E, VU by  at 6/2/2020 0925                               User Key     Initials Effective Dates Name Provider Type Discipline     08/02/16 -  Gilberto Benson COTA/L Occupational Therapy Assistant OT     08/28/18 -  Tracie Tse OTR/L Occupational Therapist OT                OT Recommendation and Plan  Outcome Summary/Treatment Plan (OT)  Daily Summary of Progress (OT): progress toward functional goals is good  Barriers to Overall Progress (OT): Fall/o2!  Plan for Continued Treatment (OT): continue with ot poc!  Daily Summary of Progress (OT): progress toward functional goals is good  Plan of Care Review  Plan of Care Reviewed With: patient  Plan of Care Reviewed With: patient  Outcome Measures     Row Name 06/03/20 0730 06/02/20 0900          How much help from another is currently needed...    Putting on and taking off regular lower body clothing?  3  -  3  -MW     Bathing (including washing, rinsing, and drying)  3  -  3  -MW     Toileting (which includes using toilet bed pan or urinal)  3  -  3  -MW     Putting on and taking off regular upper body clothing  3  -  3  -MW     Taking care of personal grooming (such as brushing teeth)  3  -  3  -MW     Eating meals  3  -  3  -MW     AM-PAC 6 Clicks Score (OT)  18  -  18  -MW        Functional Assessment    Outcome Measure Options  AM-PAC 6 Clicks Daily Activity (OT)  -  AM-PAC 6 Clicks Daily  Activity (OT)  -       User Key  (r) = Recorded By, (t) = Taken By, (c) = Cosigned By    Initials Name Provider Type    Gilberto Pereira COTA/L Occupational Therapy Assistant    Tracie Krueger, OTR/L Occupational Therapist           Time Calculation:   Time Calculation- OT     Row Name 06/03/20 0730             Time Calculation- OT    OT Start Time  0730  -      OT Stop Time  0758  -      OT Time Calculation (min)  28 min  -      Total Timed Code Minutes- OT  28 minute(s)  -      TCU Minutes- OT  28 min  -      OT Received On  06/03/20  -      OT Goal Re-Cert Due Date  06/12/20  -        User Key  (r) = Recorded By, (t) = Taken By, (c) = Cosigned By    Initials Name Provider Type     Gilberto Benson COTA/L Occupational Therapy Assistant        Therapy Charges for Today     Code Description Service Date Service Provider Modifiers Qty    [id number] HC OT THER PROC EA 15 MIN 6/3/2020 Gliberto Benson COTA/L GO 2               ROSEMARY Pandey/ANTIONE  6/3/2020

## 2020-06-03 NOTE — PLAN OF CARE
Problem: Fall Risk (Adult)  Goal: Absence of Fall  Outcome: Ongoing (interventions implemented as appropriate)  Flowsheets (Taken 6/3/2020 0652 by Jess Brown LPN)  Absence of Fall: making progress toward outcome  Note:   VSS, conts on 4L/NC with sats in mid 90's, less episodes of harsh coughing, gets fatigued and SOB when up to BR, colace given per pt request, no c/o of pain, lungs continue to coarse, appetite fair

## 2020-06-03 NOTE — PLAN OF CARE
Problem: Patient Care Overview  Goal: Plan of Care Review  Outcome: Ongoing (interventions implemented as appropriate)  Flowsheets (Taken 6/3/2020 1140)  Outcome Summary: Pt. is independent in bed mobility. Stands with stand by assist. Ambulates with SBQC for 100'. Requires CGA and 2 standing rest breaks. 4LPM and SAT to 85% during activity but quickly recovers to 94%. Will continue to benefit from strengthening activities.

## 2020-06-03 NOTE — PROGRESS NOTES
[facility] Medicine Services  INPATIENT PROGRESS NOTE    Length of Stay: 3  Date of Admission: 5/31/2020  Primary Care Physician: Provider, No Known    Subjective   Chief Complaint: Follow-up shortness of breath  HPI   Patient resting in bed.  She states she continues to feel little better each day.  She still has shortness of breath with exertion.  She is still coughing, but does not feel she is coughing quite as much.  She is still unable to expectorate sputum.  She denies chest pain.  She is eating and drinking well.    Review of Systems   All pertinent negatives and positives are as above. All other systems have been reviewed and are negative unless otherwise stated.     Objective    Temp:  [96.9 °F (36.1 °C)-98 °F (36.7 °C)] 96.9 °F (36.1 °C)  Heart Rate:  [] 105  Resp:  [16-21] 18  BP: (136-149)/(68-78) 144/69  Physical Exam  Constitutional: She is oriented to person, place, and time. She appears well-developed and well-nourished. No distress.   HENT:   Head: Normocephalic and atraumatic.   Neck: Normal range of motion. Neck supple. No JVD present. No tracheal deviation present.   Cardiovascular: Normal rate, regular rhythm and intact distal pulses. Exam reveals no gallop and no friction rub.   Sinus- sinus tach   Pulmonary/Chest: Effort normal. She has wheezes. She has no rales.   Tight lung sounds  Abdominal: Soft. Bowel sounds are normal. She exhibits no distension. There is no tenderness. There is no guarding.   Musculoskeletal: Normal range of motion. She exhibits no edema or tenderness.   Neurological: She is alert and oriented to person, place, and time. No cranial nerve deficit.   Skin: Skin is warm and dry. No rash noted. No erythema.   Psychiatric: She has a normal mood and affect. Her behavior is normal. Judgment and thought content normal.   Vitals reviewed.    Results Review:  I have reviewed the labs, radiology results, and diagnostic  studies.    Laboratory Data:   Results from last 7 days   Lab Units 05/31/20  1236   WBC 10*3/mm3 11.22*   HEMOGLOBIN g/dL 12.4   HEMATOCRIT % 41.2   PLATELETS 10*3/mm3 307      Results from last 7 days   Lab Units 05/31/20  1255 05/31/20  1236   SODIUM mmol/L  --  141   SODIUM, ARTERIAL mmol/L 141  --    POTASSIUM mmol/L  --  4.4   CHLORIDE mmol/L  --  98   CO2 mmol/L  --  27.0   BUN mg/dL  --  16   CREATININE mg/dL  --  0.65   CALCIUM mg/dL  --  9.5   BILIRUBIN mg/dL  --  0.2   ALK PHOS U/L  --  95   ALT (SGPT) U/L  --  11   AST (SGOT) U/L  --  19   GLUCOSE mg/dL  --  216*     I have reviewed the patient current medications.     Assessment/Plan     Active Hospital Problems    Diagnosis   • COPD exacerbation (CMS/HCC)   • Acute on chronic respiratory failure with hypoxia and hypercapnia (CMS/[facility])   • CAD (coronary artery disease)   • Diabetes mellitus (CMS/[facility])     Plan:  1.  Patient admitted 5/31/2020 with complaints of shortness of breath/cough.  Chest x-ray showed no acute process.  Patient was admitted for treatment of acute exacerbation of chronic restrictive pulmonary disease.  On arrival to the floor, patient's oxygen saturation was found to be in the low 80s on 4 L.  Rapid response team was activated, and patient was placed on BiPAP with improvement of her symptoms.  2.  Continue Xopenex/Atrovent, and home nebulizers Pulmicort and Brovana.  Continue Mucinex and use of flutter valve.  Continue antitussives as needed.  3.  Will slowly taper Solu-Medrol, decrease to 40 mg every 8 hours.  Lung sounds still tight.   4.  Continue azithromycin.  Respiratory PCR panel negative.  COVID-19 testing negative.  5.  Patient's [employer] is to adjust settings on her [place] for comfort as she felt as though there was not enough pressure on her machine as compared to the hospital's BiPAP.  6.  Continue PT/OT.  They have recommended discharge home with assist and home health-however patient will need a local  physician to follow for home health.  7.  Last blood glucoses- 273, 289, 323.  Continue metformin.  Increase to high-dose sliding scale for better control while hospitalized on IV steroid therapy.  8.  Lab holiday in a.m.    Discharge Planning: I expect the patient to be discharged to home with home health in 1-2 days.    TERRENCE Hansen   06/03/20   15:49    I personally evaluated and examined the patient in conjunction with TERRENCE Kowalski and agree with the assessment, treatment plan, and disposition of the patient as recorded by her. My history, exam, and further recommendations are:     Heart: RRR, +s1/s2  Lungs: diminished bs b/l, mild end exp wheeze  Abd: soft, NT, +BS  Neuro: intact, no focal deficits    Agree with note as above.  Patient is improving.  Still tight exam but unknown what her baseline exam is.  She is on her home O2 right now.  Minimal expiratory wheeze.  Atenolol current treatments and begin to ambulate the patient more.  Hopefully home in several days.    Arsh Roman DO  06/03/20  16:45

## 2020-06-04 LAB
ANION GAP SERPL CALCULATED.3IONS-SCNC: 14 MMOL/L (ref 5–15)
BASOPHILS # BLD AUTO: 0.01 10*3/MM3 (ref 0–0.2)
BASOPHILS NFR BLD AUTO: 0.1 % (ref 0–1.5)
BUN BLD-MCNC: 28 MG/DL (ref 8–23)
BUN/CREAT SERPL: 48.3 (ref 7–25)
CALCIUM SPEC-SCNC: 9.6 MG/DL (ref 8.6–10.5)
CHLORIDE SERPL-SCNC: 93 MMOL/L (ref 98–107)
CO2 SERPL-SCNC: 31 MMOL/L (ref 22–29)
CREAT BLD-MCNC: 0.58 MG/DL (ref 0.57–1)
DEPRECATED RDW RBC AUTO: 44.7 FL (ref 37–54)
EOSINOPHIL # BLD AUTO: 0 10*3/MM3 (ref 0–0.4)
EOSINOPHIL NFR BLD AUTO: 0 % (ref 0.3–6.2)
ERYTHROCYTE [DISTWIDTH] IN BLOOD BY AUTOMATED COUNT: 13.8 % (ref 12.3–15.4)
GFR SERPL CREATININE-BSD FRML MDRD: 102 ML/MIN/1.73
GLUCOSE BLD-MCNC: 289 MG/DL (ref 65–99)
GLUCOSE BLDC GLUCOMTR-MCNC: 204 MG/DL (ref 70–130)
GLUCOSE BLDC GLUCOMTR-MCNC: 284 MG/DL (ref 70–130)
GLUCOSE BLDC GLUCOMTR-MCNC: 330 MG/DL (ref 70–130)
HCT VFR BLD AUTO: 38.3 % (ref 34–46.6)
HGB BLD-MCNC: 12.2 G/DL (ref 12–15.9)
IMM GRANULOCYTES # BLD AUTO: 0.09 10*3/MM3 (ref 0–0.05)
IMM GRANULOCYTES NFR BLD AUTO: 0.6 % (ref 0–0.5)
LYMPHOCYTES # BLD AUTO: 0.62 10*3/MM3 (ref 0.7–3.1)
LYMPHOCYTES NFR BLD AUTO: 4.4 % (ref 19.6–45.3)
MCH RBC QN AUTO: 27.9 PG (ref 26.6–33)
MCHC RBC AUTO-ENTMCNC: 31.9 G/DL (ref 31.5–35.7)
MCV RBC AUTO: 87.4 FL (ref 79–97)
MONOCYTES # BLD AUTO: 0.48 10*3/MM3 (ref 0.1–0.9)
MONOCYTES NFR BLD AUTO: 3.4 % (ref 5–12)
NEUTROPHILS # BLD AUTO: 12.75 10*3/MM3 (ref 1.7–7)
NEUTROPHILS NFR BLD AUTO: 91.5 % (ref 42.7–76)
NRBC BLD AUTO-RTO: 0 /100 WBC (ref 0–0.2)
PLATELET # BLD AUTO: 295 10*3/MM3 (ref 140–450)
PMV BLD AUTO: 10 FL (ref 6–12)
POTASSIUM BLD-SCNC: 4.4 MMOL/L (ref 3.5–5.2)
RBC # BLD AUTO: 4.38 10*6/MM3 (ref 3.77–5.28)
SODIUM BLD-SCNC: 138 MMOL/L (ref 136–145)
WBC NRBC COR # BLD: 13.95 10*3/MM3 (ref 3.4–10.8)

## 2020-06-04 PROCEDURE — 94799 UNLISTED PULMONARY SVC/PX: CPT

## 2020-06-04 PROCEDURE — 80048 BASIC METABOLIC PNL TOTAL CA: CPT | Performed by: NURSE PRACTITIONER

## 2020-06-04 PROCEDURE — 97110 THERAPEUTIC EXERCISES: CPT

## 2020-06-04 PROCEDURE — 82962 GLUCOSE BLOOD TEST: CPT

## 2020-06-04 PROCEDURE — 97535 SELF CARE MNGMENT TRAINING: CPT

## 2020-06-04 PROCEDURE — 97116 GAIT TRAINING THERAPY: CPT

## 2020-06-04 PROCEDURE — 25010000002 METHYLPREDNISOLONE PER 40 MG: Performed by: NURSE PRACTITIONER

## 2020-06-04 PROCEDURE — 63710000001 INSULIN LISPRO (HUMAN) PER 5 UNITS: Performed by: NURSE PRACTITIONER

## 2020-06-04 PROCEDURE — 85025 COMPLETE CBC W/AUTO DIFF WBC: CPT | Performed by: NURSE PRACTITIONER

## 2020-06-04 RX ORDER — METHYLPREDNISOLONE SODIUM SUCCINATE 40 MG/ML
40 INJECTION, POWDER, LYOPHILIZED, FOR SOLUTION INTRAMUSCULAR; INTRAVENOUS EVERY 12 HOURS
Status: DISCONTINUED | OUTPATIENT
Start: 2020-06-04 | End: 2020-06-06 | Stop reason: HOSPADM

## 2020-06-04 RX ADMIN — AZITHROMYCIN 500 MG: 250 TABLET, FILM COATED ORAL at 08:38

## 2020-06-04 RX ADMIN — METHYLPREDNISOLONE SODIUM SUCCINATE 40 MG: 40 INJECTION, POWDER, FOR SOLUTION INTRAMUSCULAR; INTRAVENOUS at 08:38

## 2020-06-04 RX ADMIN — LEVALBUTEROL 1.25 MG: 1.25 SOLUTION, CONCENTRATE RESPIRATORY (INHALATION) at 15:40

## 2020-06-04 RX ADMIN — CLOPIDOGREL 75 MG: 75 TABLET, FILM COATED ORAL at 08:38

## 2020-06-04 RX ADMIN — METHYLPREDNISOLONE SODIUM SUCCINATE 40 MG: 40 INJECTION, POWDER, FOR SOLUTION INTRAMUSCULAR; INTRAVENOUS at 00:51

## 2020-06-04 RX ADMIN — FLUTICASONE PROPIONATE 2 SPRAY: 50 SPRAY, METERED NASAL at 10:38

## 2020-06-04 RX ADMIN — PANTOPRAZOLE SODIUM 40 MG: 40 TABLET, DELAYED RELEASE ORAL at 08:38

## 2020-06-04 RX ADMIN — ARFORMOTEROL TARTRATE 15 MCG: 15 SOLUTION RESPIRATORY (INHALATION) at 21:29

## 2020-06-04 RX ADMIN — ATORVASTATIN CALCIUM 80 MG: 40 TABLET, FILM COATED ORAL at 21:04

## 2020-06-04 RX ADMIN — LEVALBUTEROL 1.25 MG: 1.25 SOLUTION, CONCENTRATE RESPIRATORY (INHALATION) at 06:36

## 2020-06-04 RX ADMIN — DEXTROMETHORPHAN 60 MG: 30 SUSPENSION, EXTENDED RELEASE ORAL at 21:07

## 2020-06-04 RX ADMIN — METFORMIN HYDROCHLORIDE 500 MG: 500 TABLET ORAL at 17:12

## 2020-06-04 RX ADMIN — INSULIN LISPRO 12 UNITS: 100 INJECTION, SOLUTION INTRAVENOUS; SUBCUTANEOUS at 08:38

## 2020-06-04 RX ADMIN — METHYLPREDNISOLONE SODIUM SUCCINATE 40 MG: 40 INJECTION, POWDER, FOR SOLUTION INTRAMUSCULAR; INTRAVENOUS at 21:04

## 2020-06-04 RX ADMIN — LEVALBUTEROL 1.25 MG: 1.25 SOLUTION, CONCENTRATE RESPIRATORY (INHALATION) at 11:30

## 2020-06-04 RX ADMIN — IPRATROPIUM BROMIDE 0.5 MG: 0.5 SOLUTION RESPIRATORY (INHALATION) at 21:24

## 2020-06-04 RX ADMIN — IPRATROPIUM BROMIDE 0.5 MG: 0.5 SOLUTION RESPIRATORY (INHALATION) at 11:30

## 2020-06-04 RX ADMIN — INSULIN LISPRO 16 UNITS: 100 INJECTION, SOLUTION INTRAVENOUS; SUBCUTANEOUS at 17:12

## 2020-06-04 RX ADMIN — LEVALBUTEROL 1.25 MG: 1.25 SOLUTION, CONCENTRATE RESPIRATORY (INHALATION) at 21:24

## 2020-06-04 RX ADMIN — INSULIN LISPRO 8 UNITS: 100 INJECTION, SOLUTION INTRAVENOUS; SUBCUTANEOUS at 14:24

## 2020-06-04 RX ADMIN — DILTIAZEM HYDROCHLORIDE 120 MG: 120 CAPSULE, COATED, EXTENDED RELEASE ORAL at 05:19

## 2020-06-04 RX ADMIN — BUDESONIDE 0.5 MG: 0.5 INHALANT RESPIRATORY (INHALATION) at 06:36

## 2020-06-04 RX ADMIN — SODIUM CHLORIDE, PRESERVATIVE FREE 10 ML: 5 INJECTION INTRAVENOUS at 08:39

## 2020-06-04 RX ADMIN — IPRATROPIUM BROMIDE 0.5 MG: 0.5 SOLUTION RESPIRATORY (INHALATION) at 15:40

## 2020-06-04 RX ADMIN — ESCITALOPRAM 10 MG: 10 TABLET, FILM COATED ORAL at 08:38

## 2020-06-04 RX ADMIN — GUAIFENESIN 1200 MG: 600 TABLET, EXTENDED RELEASE ORAL at 21:04

## 2020-06-04 RX ADMIN — SODIUM CHLORIDE, PRESERVATIVE FREE 10 ML: 5 INJECTION INTRAVENOUS at 21:04

## 2020-06-04 RX ADMIN — GUAIFENESIN 1200 MG: 600 TABLET, EXTENDED RELEASE ORAL at 08:38

## 2020-06-04 RX ADMIN — MONTELUKAST 10 MG: 10 TABLET, FILM COATED ORAL at 21:04

## 2020-06-04 RX ADMIN — IPRATROPIUM BROMIDE 0.5 MG: 0.5 SOLUTION RESPIRATORY (INHALATION) at 06:36

## 2020-06-04 RX ADMIN — METFORMIN HYDROCHLORIDE 500 MG: 500 TABLET ORAL at 08:38

## 2020-06-04 RX ADMIN — ARFORMOTEROL TARTRATE 15 MCG: 15 SOLUTION RESPIRATORY (INHALATION) at 06:36

## 2020-06-04 NOTE — PLAN OF CARE
Problem: Patient Care Overview  Goal: Plan of Care Review  Outcome: Ongoing (interventions implemented as appropriate)  Flowsheets (Taken 6/4/2020 4943)  Outcome Summary: Pt. stands and sits with stand by assist. Ambulated 65' with 3 standing rests, a quad cane and CGA. Breathing improved this a.m. Does tend to do better in a.m. Will continue to benefit from strengthening activities.

## 2020-06-04 NOTE — PLAN OF CARE
Problem: Patient Care Overview  Goal: Plan of Care Review  Outcome: Ongoing (interventions implemented as appropriate)  Flowsheets  Taken 6/4/2020 1110 by Gilberto Benson, RILEY/L  Progress: improving  Plan of Care Reviewed With: patient  Taken 6/4/2020 1550 by Aimee Ellis, RN  Outcome Summary: Patient states that her breathing is better today than on admit.  She still remains extremely dyspneic and her wheezing increases when she moves around.  Patient is on her home dose of oxygen.  VSS, will continue to monitor. Aimee Ellis, RN

## 2020-06-04 NOTE — PLAN OF CARE
Problem: Patient Care Overview  Goal: Plan of Care Review  Outcome: Ongoing (interventions implemented as appropriate)  Flowsheets (Taken 6/4/2020 1110)  Progress: improving  Plan of Care Reviewed With: patient  Note:   Pt. Able to perform adl bathing/dressing with cga-supervision from this [place]/l! Pt. Becomes sob and fatigues easily and requires rest to complete task!

## 2020-06-04 NOTE — THERAPY TREATMENT NOTE
Acute Care - Occupational Therapy Treatment Note  [facility]     Patient Name: Jessica Gregg  : 1945  MRN: [id number]  Today's Date: 2020  Onset of Illness/Injury or Date of Surgery: 20  Date of Referral to OT: 20  Referring Physician: Dr. Oglesby    Admit Date: 2020       ICD-10-CM ICD-9-CM   1. COPD exacerbation (CMS/HCC) J44.1 491.21   2. Impaired functional mobility and activity tolerance Z74.09 V49.89   3. Decreased activities of daily living (ADL) Z78.9 V49.89     Patient Active Problem List   Diagnosis   • Chronic obstructive pulmonary disease with acute exacerbation (CMS/HCC)   • CHF (congestive heart failure) (CMS/HCC)   • Diabetes mellitus (CMS/HCC)   • CAD (coronary artery disease)   • COPD exacerbation (CMS/HCC)   • Acute on chronic respiratory failure with hypoxia and hypercapnia (CMS/HCC)     Past Medical History:   Diagnosis Date   • CHF (congestive heart failure) (CMS/HCC)    • COPD (chronic obstructive pulmonary disease) (CMS/HCC)    • Diabetes mellitus (CMS/HCC)    • Emphysema lung (CMS/HCC)    • Mediastinal lymphadenopathy    • Stroke (CMS/HCC)      Past Surgical History:   Procedure Laterality Date   • CAROTID STENT     • CHOLECYSTECTOMY     • COLONOSCOPY     • ESOPHAGUS SURGERY     • SIGMOIDOSCOPY         Therapy Treatment    Rehabilitation Treatment Summary     Row Name 20 0802 20 0756          Treatment Time/Intention    Discipline  occupational therapy assistant  -CJ  physical therapy assistant  -BERNIE     Document Type  therapy note (daily note)  -CJ  therapy note (daily note)  -BERNIE     Subjective Information  complains of;weakness;fatigue  -CJ  no complaints  -BERNIE     Mode of Treatment  occupational therapy  -CJ  --     Patient Effort  good  -CJ  --     Existing Precautions/Restrictions  fall;oxygen therapy device and L/min  -CJ  fall;oxygen therapy device and L/min  -BERNIE     Recorded by [NOAM] Gilberto Benson COTA/ANTIONE 20 1109 [BERNIE] Dorota  Tatiana SANCHEZ, PTA 06/04/20 0832     Row Name 06/04/20 0756             Vital Signs    Pre SpO2 (%)  96  -BERNIE      O2 Delivery Pre Treatment  supplemental O2 4lpm  -BERNIE      Post SpO2 (%)  94  -BERNIE      O2 Delivery Post Treatment  supplemental O2  -BERNIE      Recorded by [BERNIE] Tatiana Raya, PTA 06/04/20 0832      Row Name 06/04/20 0802             Bed Mobility Assessment/Treatment    Bed Mobility Assessment/Treatment  bed mobility (all) activities  -CJ      [place] Level (Bed Mobility)  conditional independence;supervision  -CJ      Recorded by [CJ] Gilberto Benson COTA/L 06/04/20 1109      Row Name 06/04/20 0802             Functional Mobility    Functional Mobility- Ind. Level  set up required;verbal cues required;contact guard assist  -CJ      Functional Mobility-Distance (Feet)  20  -CJ      Recorded by [CJ] Gilberto Benson COTA/L 06/04/20 1109      Row Name 06/04/20 0802 06/04/20 0756          Sit-Stand Transfer    Sit-Stand [place] (Transfers)  set up;verbal cues;contact guard  -CJ  stand by assist  -BERNIE     Recorded by [CJ] Gilberto Benson COTA/L 06/04/20 1109 [BERNIE] Tatiana Raya, PTA 06/04/20 0832     Row Name 06/04/20 0802 06/04/20 0756          Stand-Sit Transfer    Stand-Sit [place] (Transfers)  set up;verbal cues;contact guard  -CJ  verbal cues;stand by assist  -BERNIE     Recorded by [CJ] Gilberto Benson COTA/L 06/04/20 1109 [BERNIE] Tatiana Raya, \[facility]\"" 06/04/20 0832     Row Name 06/04/20 0756             Gait/Stairs Assessment/Training    [place] Level (Gait)  contact guard  -BERNIE      Assistive Device (Gait)  cane, quad  -BERNIE      Distance in Feet (Gait)  70 3 standing rests  -BERNIE      Deviations/Abnormal Patterns (Gait)  [place] decreased;stride length decreased  -BERNIE      Recorded by [BERNIE] Tatiana Raya, PTA 06/04/20 0832      Row Name 06/04/20 0802             ADL Assessment/Intervention    55815 - OT Self Care/Mgmt Minutes  39  -CJ      BADL Assessment/Intervention   bathing;upper body dressing;lower body dressing;toileting  -CJ      Recorded by [CJ] Gilberto Benson COTA/L 06/04/20 1109      Row Name 06/04/20 0802             Bathing Assessment/Intervention    Bathing [place] Level  bathing skills;set up;verbal cues;contact guard assist  -CJ      Assistive Devices (Bathing)  bath mitt;grab bars/tub rail;hand-held shower spray hose;shower chair  -CJ      Bathing Position  supported sitting;supported standing  -CJ      Recorded by [CJ] Gilberto Benson COTA/L 06/04/20 1109      Row Name 06/04/20 0802             Upper Body Dressing Assessment/Training    Upper Body Dressing [place] Level  doff;don;front opening garment;set up;verbal cues;contact guard assist  -CJ      Upper Body Dressing Position  supported sitting;edge of bed sitting;supported standing  -CJ      Recorded by [CJ] Gilberto Benson COTA/L 06/04/20 1109      Row Name 06/04/20 0802             Lower Body Dressing Assessment/Training    Lower Body Dressing [place] Level  doff;don;undergarment;socks;set up;verbal cues;contact guard assist  -CJ      Lower Body Dressing Position  supported sitting;supported standing;edge of bed sitting  -CJ      Recorded by [CJ] Gilberto Benson COTA/L 06/04/20 1109      Row Name 06/04/20 0802             Toileting Assessment/Training    [place] Level (Toileting)  toileting skills;set up;supervision  -      Assistive Devices (Toileting)  commode  -      Toileting Position  supported sitting  -CJ      Recorded by [CJ] Gilberto Benson COTA/L 06/04/20 1109      Row Name 06/04/20 0756             Therapeutic Exercise    Lower Extremity Range of Motion (Therapeutic Exercise)  hip flexion/extension, bilateral;hip abduction/adduction, bilateral;knee flexion/extension, bilateral;ankle dorsiflexion/plantar flexion, bilateral  -BERNIE      Exercise Type (Therapeutic Exercise)  AROM (active range of motion)  -BERNIE      Position (Therapeutic Exercise)  seated  -BERNIE       Sets/Reps (Therapeutic Exercise)  1x15,then 1 x10  -BERNIE      Comment (Therapeutic Exercise)  multiple rests  -BERNIE      Recorded by [BERNIE] Tatiana Raya, PTA 06/04/20 0832      Row Name 06/04/20 0802 06/04/20 0756          Positioning and Restraints    Pre-Treatment Position  sitting in chair/recliner  -CJ  sitting in chair/recliner  -BERNIE     Post Treatment Position  bed  -CJ  chair  -BERNIE     In Bed  fowlers;call light within reach;encouraged to call for assist;side rails up x2  -CJ  --     In Chair  --  sitting;call light within reach;encouraged to call for assist;with family/caregiver  -BERNIE     Recorded by [CJ] Gilberto Benson COTA/L 06/04/20 1109 [BERNIE] Tatiana Raya, PTA 06/04/20 0832     Row Name 06/04/20 0802             Pain Assessment    Additional Documentation  Pain Scale 2: Word Pre/Post-Treatment (Group)  -CJ      Recorded by [CJ] Gilberto Benson COTA/L 06/04/20 1109      Row Name 06/04/20 0802 06/04/20 0756          Pain Scale: Numbers Pre/Post-Treatment    Pain Scale: Numbers, Pretreatment  0/10 - no pain  -CJ  1/10  -BERNIE     Pain Scale: Numbers, Post-Treatment  0/10 - no pain  -CJ  --     Recorded by [CJ] Gilberto Benson COTA/L 06/04/20 1109 [BERNIE] Tatiana Raya, PTA 06/04/20 0832     Row Name 06/04/20 0802             Outcome Summary/Treatment Plan (OT)    Daily Summary of Progress (OT)  progress toward functional goals is good  -      Barriers to Overall Progress (OT)  Fall/o2  -      Plan for Continued Treatment (OT)  continue with ot poc!  -CJ      Recorded by [CJ] Gilberto Benson RILEY/L 06/04/20 1109        User Key  (r) = Recorded By, (t) = Taken By, (c) = Cosigned By    Initials Name Effective Dates Discipline    CJ Gilberto Benson RILEY/L 08/02/16 -  OT    BERNIE Tatiana Raya, PTA 08/02/16 -  PT             Occupational Therapy Education                 Title: PT OT SLP Therapies (In Progress)     Topic: Occupational Therapy (Done)     Point: ADL training (Done)      Description:   Instruct learner(s) on proper safety adaptation and remediation techniques during self care or transfers.   Instruct in proper use of assistive devices.              Learning Progress Summary           Patient Acceptance, E,TB, VU,DU,NR by  at 6/4/2020 1109    Comment:  Pt. performed adl bathing/dressing!    Acceptance, E, VU by  at 6/2/2020 0925                   Point: Home exercise program (Done)     Description:   Instruct learner(s) on appropriate technique for monitoring, assisting and/or progressing therapeutic exercises/activities.              Learning Progress Summary           Patient Acceptance, E,TB, VU,DU,NR by  at 6/3/2020 1105    Comment:  Pt. performs ue exs to increase her act. rsusell!    Acceptance, E, VU by  at 6/2/2020 0925                   Point: Precautions (Done)     Description:   Instruct learner(s) on prescribed precautions during self-care and functional transfers.              Learning Progress Summary           Patient Acceptance, E,TB, VU,DU,NR by  at 6/4/2020 1109    Comment:  Pt. performed adl bathing/dressing!    Acceptance, E,TB, VU,DU,NR by  at 6/3/2020 1105    Comment:  Pt. performs ue exs to increase her act. russell!    Acceptance, E, VU by  at 6/2/2020 0925                   Point: Body mechanics (Done)     Description:   Instruct learner(s) on proper positioning and spine alignment during self-care, functional mobility activities and/or exercises.              Learning Progress Summary           Patient Acceptance, E,TB, VU,DU,NR by  at 6/4/2020 1109    Comment:  Pt. performed adl bathing/dressing!    Acceptance, E,TB, VU,DU,NR by  at 6/3/2020 1105    Comment:  Pt. performs ue exs to increase her act. russell!    Acceptance, E, VU by  at 6/2/2020 0925                               User Key     Initials Effective Dates Name Provider Type Discipline     08/02/16 -  Gilberto Benson COTA/L Occupational Therapy Assistant OT     08/28/18 -  Carmencita  Tracie SANCHEZ, OTR/L Occupational Therapist OT                OT Recommendation and Plan  Outcome Summary/Treatment Plan (OT)  Daily Summary of Progress (OT): progress toward functional goals is good  Barriers to Overall Progress (OT): Fall/o2  Plan for Continued Treatment (OT): continue with ot poc!  Daily Summary of Progress (OT): progress toward functional goals is good  Plan of Care Review  Plan of Care Reviewed With: patient  Plan of Care Reviewed With: patient  Outcome Measures     Row Name 06/04/20 0802 06/03/20 0730 06/02/20 0900       How much help from another is currently needed...    Putting on and taking off regular lower body clothing?  3  -  3  -  3  -MW    Bathing (including washing, rinsing, and drying)  3  -  3  -  3  -MW    Toileting (which includes using toilet bed pan or urinal)  3  -  3  -  3  -MW    Putting on and taking off regular upper body clothing  3  -  3  -  3  -MW    Taking care of personal grooming (such as brushing teeth)  3  -  3  -  3  -MW    Eating meals  3  -  3  -CJ  3  -MW    AM-PAC 6 Clicks Score (OT)  18  -  18  -CJ  18  -MW       Functional Assessment    Outcome Measure Options  AM-PAC 6 Clicks Daily Activity (OT)  -  AM-PAC 6 Clicks Daily Activity (OT)  -  AM-PAC 6 Clicks Daily Activity (OT)  -      User Key  (r) = Recorded By, (t) = Taken By, (c) = Cosigned By    Initials Name Provider Type     Gilberto Benson, RILEY/L Occupational Therapy Assistant    Tracie Krueger, OTR/L Occupational Therapist           Time Calculation:   Time Calculation- OT     Row Name 06/04/20 0834 06/04/20 0802          Time Calculation- OT    OT Start Time  --  0802  -     OT Stop Time  --  0841  -     OT Time Calculation (min)  --  39 min  -     Total Timed Code Minutes- OT  --  39 minute(s)  -     TCU Minutes- OT  --  39 min  -     OT Received On  --  06/04/20  -     OT Goal Re-Cert Due Date  --  06/12/20  -        Timed Charges    81528 - Gait  Training Minutes   13  -BERNIE  --     52890 - OT Self Care/Mgmt Minutes  --  39  -       User Key  (r) = Recorded By, (t) = Taken By, (c) = Cosigned By    Initials Name Provider Type    Gilberto Pereira COTA/L Occupational Therapy Assistant    Tatiana Mack, PREMA Physical Therapy Assistant        Therapy Charges for Today     Code Description Service Date Service Provider Modifiers Qty    37415555154 HC OT THER PROC EA 15 MIN 6/3/2020 Gilberto Benson COTA/L GO 2    27648548567 HC OT SELF CARE/MGMT/TRAIN EA 15 MIN 6/4/2020 Gilberto Benson COTA/L GO 3               MARILIN Pandey  6/4/2020

## 2020-06-04 NOTE — PROGRESS NOTES
[facility] Medicine Services  INPATIENT PROGRESS NOTE    Length of Stay: 4  Date of Admission: 5/31/2020  Primary Care Physician: Provider, No Known    Subjective   Chief Complaint: Follow-up shortness of breath  HPI   Sitting up in chair.  Oxygen at 4 L.  She reports she feels some better today.  Her trilogy machine was adjusted yesterday and feels as though she slept better last night.  No sputum production.  Minimal faint expiratory wheeze left posterior base.  Otherwise, lungs sound clear.  Denies chest pain, palpitations or increased shortness of breath.  Denies nausea, vomiting or abdominal pain.  Plans to stay with her sisterMariaa for the next month.    Review of Systems     All pertinent negatives and positives are as above. All other systems have been reviewed and are negative unless otherwise stated.     Objective    Temp:  [97 °F (36.1 °C)-98 °F (36.7 °C)] 98 °F (36.7 °C)  Heart Rate:  [] 98  Resp:  [18-22] 18  BP: (133-172)/(55-93) 144/73  Physical Exam   Constitutional: She is oriented to person, place, and time.   No distress.  Sitting up in chair.  Oxygen at 4 L.   Eyes: Pupils are equal, round, and reactive to light. EOM are normal.   Neck: Normal range of motion. Neck supple.   Cardiovascular: Normal rate, regular rhythm, normal heart sounds and intact distal pulses. Exam reveals no gallop and no friction rub.   No murmur heard.  Normal sinus rhythm  on telemetry   Pulmonary/Chest: She has wheezes (Faint expiratory wheeze left posterior base.  Otherwise clear.).   Oxygen at 4 L.   Abdominal: Soft. Bowel sounds are normal. She exhibits no distension. There is no tenderness.   Genitourinary:   Genitourinary Comments: Voiding.   Musculoskeletal: Normal range of motion. She exhibits no edema, tenderness or deformity.   Neurological: She is alert and oriented to person, place, and time.   Skin: Skin is warm and dry. No rash noted. No erythema. No pallor.    Psychiatric: She has a normal mood and affect. Her behavior is normal. Judgment and thought content normal.     Results Review:  I have reviewed the labs, radiology results, and diagnostic studies.    Laboratory Data:   Results from last 7 days   Lab Units 06/04/20  0700 05/31/20  1236   WBC 10*3/mm3 13.95* 11.22*   HEMOGLOBIN g/dL 12.2 12.4   HEMATOCRIT % 38.3 41.2   PLATELETS 10*3/mm3 295 307        Results from last 7 days   Lab Units 06/04/20  0700 05/31/20  1255 05/31/20  1236   SODIUM mmol/L 138  --  141   SODIUM, ARTERIAL mmol/L  --  141  --    POTASSIUM mmol/L 4.4  --  4.4   CHLORIDE mmol/L 93*  --  98   CO2 mmol/L 31.0*  --  27.0   BUN mg/dL 28*  --  16   CREATININE mg/dL 0.58  --  0.65   CALCIUM mg/dL 9.6  --  9.5   BILIRUBIN mg/dL  --   --  0.2   ALK PHOS U/L  --   --  95   ALT (SGPT) U/L  --   --  11   AST (SGOT) U/L  --   --  19   GLUCOSE mg/dL 289*  --  216*     No results found for: BLOODCX, URINECX, WOUNDCX, MRSACX, RESPCX, STOOLCX  Imaging Results (All)     Procedure Component Value Units Date/Time    XR Chest 1 View [[id number]] Collected:  05/31/20 1359     Updated:  05/31/20 1404    Narrative:       EXAMINATION: Chest 1 view 05/31/2020     HISTORY: CHF/COPD protocol     FINDINGS: Today's exam is compared to previous study of 07/06/2019.  Lungs are mildly hyperinflated. There is no evidence of lobar pneumonia  or effusion. There is atheromatous calcification of the aortic arch.       Impression:       . No acute disease.  This report was finalized on 05/31/2020 14:01 by Dr. Beau Bills MD.        Intake/Output    Intake/Output Summary (Last 24 hours) at 6/4/2020 1152  Last data filed at 6/4/2020 1000  Gross per 24 hour   Intake 720 ml   Output 1800 ml   Net -1080 ml       Scheduled Meds    arformoterol 15 mcg Nebulization BID - RT   atorvastatin 80 mg Oral Nightly   azithromycin 500 mg Oral Q24H   budesonide 0.5 mg Nebulization Daily - RT   clopidogrel 75 mg Oral Daily   dilTIAZem  mg  Oral Q AM   escitalopram 10 mg Oral Daily   fluticasone 2 spray Nasal Daily   guaiFENesin 1,200 mg Oral Q12H   insulin lispro 4-24 Units Subcutaneous TID AC   ipratropium 0.5 mg Nebulization 4x Daily - RT   levalbuterol 1.25 mg Nebulization 4x Daily - RT   metFORMIN 500 mg Oral BID With Meals   methylPREDNISolone sodium succinate 40 mg Intravenous Q8H   montelukast 10 mg Oral Nightly   pantoprazole 40 mg Oral Daily       I have reviewed the patient current medications.     Assessment/Plan     Active Hospital Problems    Diagnosis   • COPD exacerbation (CMS/HCC)   • Acute on chronic respiratory failure with hypoxia and hypercapnia (CMS/HCC)   • CAD (coronary artery disease)   • Diabetes mellitus (CMS/[facility])     Plan:  1.  Admitted 5/31/2020 with shortness of breath cough.  Chest x-ray showed no acute process.  Admitted with exacerbation of chronic obstructive pulmonary disease.  On arrival to the floor, saturation noted to be in the low 80s on 4 L.  Rapid response team activated.  Patient placed on BiPAP with improvement of symptoms.  2.  Currently on 4 L oxygen which is baseline.  DME company adjusted settings on [employer] last night and patient feels as though she rested better.  3.  Azithromycin oral. Respiratory PCR negative, COVID 19  negative  4.  Decrease Solu-Medrol to 40 mg every 12 hours.  Transition to oral prednisone tomorrow.  Continue Xopenex/Atrovent, Pulmicort, Brovana, Mucinex, flutter valve  5.  Glucoses 284, 289, 305.  Sliding scale insulin coverage.  Metformin.  6.  Physical therapy.  Ambulated 65 feet with 3 standing rest.  Quad cane and contact-guard.  Patient plans to stay with her sister, Mariaa for the next month as patient resides 300 miles away in [place].    Her sister, Mariaa will assist with decision-making if she is unable to make decisions for herself.  The above documentation resulted from a face-to-face encounter by lobito OCAMPO, ACNP-BC.    Discharge Planning: I expect  patient to be discharged to home with sister in 1-2 days.    TERRENCE Goode   06/04/20   11:52      I personally evaluated and examined the patient in conjunction with TERRENCE Vidal and agree with the assessment, treatment plan, and disposition of the patient as recorded by her. My history, exam, and further recommendations are:     Seen and examined.  Says she is feeling closer to her baseline.  She is been up and walking and doing okay.  Think she needs another day to go home.    Heart regular rate and rhythm, positive S1/S2  Lungs diminished bilaterally with minimal end expiratory wheeze  Abdomen soft nontender positive bowel sounds  Extremities no edema      Agree with note as above.  Continue current treatments.  Encourage patient to ambulate more today with walker.  Patient is on her baseline home O2 and feels close to baseline.  Potentially home tomorrow.    Arsh Roman DO  06/04/20  16:20

## 2020-06-04 NOTE — PLAN OF CARE
Problem: Patient Care Overview  Goal: Plan of Care Review  Outcome: Ongoing (interventions implemented as appropriate)  Flowsheets (Taken 6/4/2020 1094)  Outcome Summary: VSS. No complaint of pain. Remains on 4L NC and CPAP at night.Up with quad cane and standby assist. Monitor and notify MD of any changes.

## 2020-06-05 LAB
GLUCOSE BLDC GLUCOMTR-MCNC: 204 MG/DL (ref 70–130)
GLUCOSE BLDC GLUCOMTR-MCNC: 272 MG/DL (ref 70–130)
GLUCOSE BLDC GLUCOMTR-MCNC: 301 MG/DL (ref 70–130)

## 2020-06-05 PROCEDURE — 94799 UNLISTED PULMONARY SVC/PX: CPT

## 2020-06-05 PROCEDURE — 97110 THERAPEUTIC EXERCISES: CPT

## 2020-06-05 PROCEDURE — 97116 GAIT TRAINING THERAPY: CPT

## 2020-06-05 PROCEDURE — 82962 GLUCOSE BLOOD TEST: CPT

## 2020-06-05 PROCEDURE — 63710000001 INSULIN LISPRO (HUMAN) PER 5 UNITS: Performed by: NURSE PRACTITIONER

## 2020-06-05 PROCEDURE — 25010000002 METHYLPREDNISOLONE PER 40 MG: Performed by: NURSE PRACTITIONER

## 2020-06-05 RX ADMIN — MONTELUKAST 10 MG: 10 TABLET, FILM COATED ORAL at 21:15

## 2020-06-05 RX ADMIN — GUAIFENESIN 1200 MG: 600 TABLET, EXTENDED RELEASE ORAL at 21:15

## 2020-06-05 RX ADMIN — FLUTICASONE PROPIONATE 2 SPRAY: 50 SPRAY, METERED NASAL at 10:56

## 2020-06-05 RX ADMIN — IPRATROPIUM BROMIDE 0.5 MG: 0.5 SOLUTION RESPIRATORY (INHALATION) at 11:13

## 2020-06-05 RX ADMIN — IPRATROPIUM BROMIDE 0.5 MG: 0.5 SOLUTION RESPIRATORY (INHALATION) at 07:03

## 2020-06-05 RX ADMIN — GUAIFENESIN 1200 MG: 600 TABLET, EXTENDED RELEASE ORAL at 10:54

## 2020-06-05 RX ADMIN — METHYLPREDNISOLONE SODIUM SUCCINATE 40 MG: 40 INJECTION, POWDER, FOR SOLUTION INTRAMUSCULAR; INTRAVENOUS at 21:15

## 2020-06-05 RX ADMIN — IPRATROPIUM BROMIDE 0.5 MG: 0.5 SOLUTION RESPIRATORY (INHALATION) at 14:55

## 2020-06-05 RX ADMIN — AZITHROMYCIN 500 MG: 250 TABLET, FILM COATED ORAL at 10:54

## 2020-06-05 RX ADMIN — LEVALBUTEROL 1.25 MG: 1.25 SOLUTION, CONCENTRATE RESPIRATORY (INHALATION) at 19:45

## 2020-06-05 RX ADMIN — METHYLPREDNISOLONE SODIUM SUCCINATE 40 MG: 40 INJECTION, POWDER, FOR SOLUTION INTRAMUSCULAR; INTRAVENOUS at 10:55

## 2020-06-05 RX ADMIN — IPRATROPIUM BROMIDE 0.5 MG: 0.5 SOLUTION RESPIRATORY (INHALATION) at 19:45

## 2020-06-05 RX ADMIN — ATORVASTATIN CALCIUM 80 MG: 40 TABLET, FILM COATED ORAL at 21:15

## 2020-06-05 RX ADMIN — PANTOPRAZOLE SODIUM 40 MG: 40 TABLET, DELAYED RELEASE ORAL at 10:54

## 2020-06-05 RX ADMIN — CLOPIDOGREL 75 MG: 75 TABLET, FILM COATED ORAL at 10:54

## 2020-06-05 RX ADMIN — DILTIAZEM HYDROCHLORIDE 120 MG: 120 CAPSULE, COATED, EXTENDED RELEASE ORAL at 05:49

## 2020-06-05 RX ADMIN — METFORMIN HYDROCHLORIDE 500 MG: 500 TABLET ORAL at 10:54

## 2020-06-05 RX ADMIN — INSULIN LISPRO 12 UNITS: 100 INJECTION, SOLUTION INTRAVENOUS; SUBCUTANEOUS at 12:18

## 2020-06-05 RX ADMIN — INSULIN LISPRO 8 UNITS: 100 INJECTION, SOLUTION INTRAVENOUS; SUBCUTANEOUS at 17:19

## 2020-06-05 RX ADMIN — ESCITALOPRAM 10 MG: 10 TABLET, FILM COATED ORAL at 10:55

## 2020-06-05 RX ADMIN — ARFORMOTEROL TARTRATE 15 MCG: 15 SOLUTION RESPIRATORY (INHALATION) at 07:50

## 2020-06-05 RX ADMIN — ARFORMOTEROL TARTRATE 15 MCG: 15 SOLUTION RESPIRATORY (INHALATION) at 19:55

## 2020-06-05 RX ADMIN — SODIUM CHLORIDE, PRESERVATIVE FREE 10 ML: 5 INJECTION INTRAVENOUS at 10:55

## 2020-06-05 RX ADMIN — BENZONATATE 200 MG: 100 CAPSULE ORAL at 18:11

## 2020-06-05 RX ADMIN — METFORMIN HYDROCHLORIDE 500 MG: 500 TABLET ORAL at 17:19

## 2020-06-05 RX ADMIN — BUDESONIDE 0.5 MG: 0.5 INHALANT RESPIRATORY (INHALATION) at 07:09

## 2020-06-05 RX ADMIN — DEXTROMETHORPHAN 60 MG: 30 SUSPENSION, EXTENDED RELEASE ORAL at 18:41

## 2020-06-05 RX ADMIN — INSULIN LISPRO 16 UNITS: 100 INJECTION, SOLUTION INTRAVENOUS; SUBCUTANEOUS at 10:55

## 2020-06-05 NOTE — PROGRESS NOTES
Continued Stay Note   [facility]     Patient Name: Jessica Gregg  MRN: [id number]  Today's Date: 6/5/2020    Admit Date: 5/31/2020    Discharge Plan     Row Name 06/05/20 0855       Plan    Plan  Home Health( will need 1st established PCP appt. first)    Patient/Family in Agreement with Plan  yes    Plan Comments  Pt will return to live with sister at discharge. Pt will need PCP established, then HH care to follow at home.  Darryl came to hospital and changed NIV settings.  Note possible d/c today. Will follow.         Discharge Codes    No documentation.             NATHAN Resendez

## 2020-06-05 NOTE — THERAPY TREATMENT NOTE
Acute Care - Occupational Therapy Treatment Note  [facility]     Patient Name: Jessica Gregg  : 1945  MRN: [id number]  Today's Date: 2020  Onset of Illness/Injury or Date of Surgery: 20  Date of Referral to OT: 20  Referring Physician: Dr. Oglesby    Admit Date: 2020       ICD-10-CM ICD-9-CM   1. COPD exacerbation (CMS/HCC) J44.1 491.21   2. Impaired functional mobility and activity tolerance Z74.09 V49.89   3. Decreased activities of daily living (ADL) Z78.9 V49.89     Patient Active Problem List   Diagnosis   • Chronic obstructive pulmonary disease with acute exacerbation (CMS/HCC)   • CHF (congestive heart failure) (CMS/HCC)   • Diabetes mellitus (CMS/HCC)   • CAD (coronary artery disease)   • COPD exacerbation (CMS/HCC)   • Acute on chronic respiratory failure with hypoxia and hypercapnia (CMS/HCC)     Past Medical History:   Diagnosis Date   • CHF (congestive heart failure) (CMS/HCC)    • COPD (chronic obstructive pulmonary disease) (CMS/HCC)    • Diabetes mellitus (CMS/HCC)    • Emphysema lung (CMS/HCC)    • Mediastinal lymphadenopathy    • Stroke (CMS/HCC)      Past Surgical History:   Procedure Laterality Date   • CAROTID STENT     • CHOLECYSTECTOMY     • COLONOSCOPY     • ESOPHAGUS SURGERY     • SIGMOIDOSCOPY         Therapy Treatment    Rehabilitation Treatment Summary     Row Name 20             Treatment Time/Intention    Discipline  occupational therapy assistant  -CJ      Document Type  therapy note (daily note)  -CJ      Subjective Information  complains of;weakness;fatigue  -CJ      Mode of Treatment  occupational therapy  -CJ      Patient Effort  good  -CJ      Existing Precautions/Restrictions  fall;oxygen therapy device and L/min  -CJ      Recorded by [CJ] Gilberto Benson COTA/L 20 1123      Row Name 20 0913             Bed Mobility Assessment/Treatment    Comment (Bed Mobility)  fowlers in bed!  -CJ      Recorded by [CJ] Marcelino  ROSEMARY Quinn/L 06/05/20 1123      Row Name 06/05/20 0913             Motor Skills Assessment/Interventions    Additional Documentation  Therapeutic Exercise (Group)  -CJ      Recorded by [CJ] Gilberto Benson COTA/L 06/05/20 1123      Row Name 06/05/20 0913             Therapeutic Exercise    Upper Extremity (Therapeutic Exercise)  bicep curl, bilateral;wand exercises  -CJ      Upper Extremity Range of Motion (Therapeutic Exercise)  shoulder abduction/adduction, bilateral;elbow flexion/extension, bilateral;wrist flexion/extension, bilateral  -CJ      Weight/Resistance (Therapeutic Exercise)  2 pounds;3 pounds  -CJ      Exercise Type (Therapeutic Exercise)  AROM (active range of motion)  -CJ      Position (Therapeutic Exercise)  seated  -CJ      Sets/Reps (Therapeutic Exercise)  2 sets x 20 reps!  -      Equipment (Therapeutic Exercise)  dowel blu/wand;free weight, barbell  -CJ      Expected Outcome (Therapeutic Exercise)  facilitate normal movement patterns;improve functional stability;improve functional tolerance, self-care activity;improve motor control;improve neuromuscular control;improve performance, BADLs;improve performance, fine motor coordination skills;improve performance, gait skills;improve performance, transfer skills;improve postural control;increase active range of motion  -CJ      Recorded by [CJ] Gilberto Benson COTA/L 06/05/20 1123      Row Name 06/05/20 0913             Positioning and Restraints    Pre-Treatment Position  in bed  -CJ      Post Treatment Position  bed  -CJ      In Bed  fowlers;call light within reach;encouraged to call for assist;side rails up x2  -CJ      Recorded by [CJ] Gilberto Benson COTA/L 06/05/20 1123      Row Name 06/05/20 0913             Pain Assessment    Additional Documentation  Pain Scale 2: Word Pre/Post-Treatment (Group)  -CJ      Recorded by [CJ] Gilberto Benson COTA/L 06/05/20 1123      Row Name 06/05/20 0913             Pain Scale: Numbers  Pre/Post-Treatment    Pain Scale: Numbers, Pretreatment  0/10 - no pain  -      Pain Scale: Numbers, Post-Treatment  0/10 - no pain  -      Recorded by [CJ] Gilberto Benson COTA/L 06/05/20 1123      Row Name 06/05/20 0913             Outcome Summary/Treatment Plan (OT)    Daily Summary of Progress (OT)  progress toward functional goals is good  -      Barriers to Overall Progress (OT)  Fall/o2!  -CJ      Plan for Continued Treatment (OT)  continue with ot poc!  -CJ      Recorded by [CJ] Gilberto Benson COTA/ANTIONE 06/05/20 1123        User Key  (r) = Recorded By, (t) = Taken By, (c) = Cosigned By    Initials Name Effective Dates Discipline     Gilberto Benson COTA/L 08/02/16 -  OT             Occupational Therapy Education                 Title: PT OT SLP Therapies (In Progress)     Topic: Occupational Therapy (Done)     Point: ADL training (Done)     Description:   Instruct learner(s) on proper safety adaptation and remediation techniques during self care or transfers.   Instruct in proper use of assistive devices.              Learning Progress Summary           Patient Acceptance, E,TB, VU,DU,NR by  at 6/4/2020 1109    Comment:  Pt. performed adl bathing/dressing!    Acceptance, E, VU by  at 6/2/2020 0925                   Point: Home exercise program (Done)     Description:   Instruct learner(s) on appropriate technique for monitoring, assisting and/or progressing therapeutic exercises/activities.              Learning Progress Summary           Patient Acceptance, E,TB, VU,DU,NR by  at 6/5/2020 1123    Comment:  Pt. fowlers in bed, performed ue exs!    Acceptance, E,TB, VU,DU,NR by  at 6/3/2020 1105    Comment:  Pt. performs ue exs to increase her act. russell!    Acceptance, E, VU by  at 6/2/2020 0925                   Point: Precautions (Done)     Description:   Instruct learner(s) on prescribed precautions during self-care and functional transfers.              Learning Progress Summary            Patient Acceptance, E,TB, VU,DU,NR by  at 6/5/2020 1123    Comment:  Pt. fowlers in bed, performed ue exs!    Acceptance, E,TB, VU,DU,NR by  at 6/4/2020 1109    Comment:  Pt. performed adl bathing/dressing!    Acceptance, E,TB, VU,DU,NR by  at 6/3/2020 1105    Comment:  Pt. performs ue exs to increase her act. russell!    Acceptance, E, VU by  at 6/2/2020 0925                   Point: Body mechanics (Done)     Description:   Instruct learner(s) on proper positioning and spine alignment during self-care, functional mobility activities and/or exercises.              Learning Progress Summary           Patient Acceptance, E,TB, VU,DU,NR by  at 6/5/2020 1123    Comment:  Pt. fowlers in bed, performed ue exs!    Acceptance, E,TB, VU,DU,NR by  at 6/4/2020 1109    Comment:  Pt. performed adl bathing/dressing!    Acceptance, E,TB, VU,DU,NR by  at 6/3/2020 1105    Comment:  Pt. performs ue exs to increase her act. russell!    Acceptance, E, VU by  at 6/2/2020 0925                               User Key     Initials Effective Dates Name Provider Type Discipline     08/02/16 -  Gilberto Benson COTA/L Occupational Therapy Assistant OT     08/28/18 -  Tracie Tse, JOHANNA/L Occupational Therapist OT                OT Recommendation and Plan  Outcome Summary/Treatment Plan (OT)  Daily Summary of Progress (OT): progress toward functional goals is good  Barriers to Overall Progress (OT): Fall/o2!  Plan for Continued Treatment (OT): continue with ot poc!  Daily Summary of Progress (OT): progress toward functional goals is good  Plan of Care Review  Plan of Care Reviewed With: patient  Plan of Care Reviewed With: patient  Outcome Measures     Row Name 06/05/20 0913 06/04/20 0802 06/03/20 0730       How much help from another is currently needed...    Putting on and taking off regular lower body clothing?  3  -CJ  3  -CJ  3  -CJ    Bathing (including washing, rinsing, and drying)  3  -CJ  3  -CJ  3  -CJ     Toileting (which includes using toilet bed pan or urinal)  3  -CJ  3  -CJ  3  -CJ    Putting on and taking off regular upper body clothing  3  -CJ  3  -CJ  3  -CJ    Taking care of personal grooming (such as brushing teeth)  3  -CJ  3  -CJ  3  -CJ    Eating meals  3  -CJ  3  -CJ  3  -CJ    AM-PAC 6 Clicks Score (OT)  18  -CJ  18  -CJ  18  -CJ       Functional Assessment    Outcome Measure Options  AM-PAC 6 Clicks Daily Activity (OT)  -CJ  AM-PAC 6 Clicks Daily Activity (OT)  -CJ  AM-PAC 6 Clicks Daily Activity (OT)  -      User Key  (r) = Recorded By, (t) = Taken By, (c) = Cosigned By    Initials Name Provider Type     Gilberto Benson COTA/L Occupational Therapy Assistant           Time Calculation:   Time Calculation- OT     Row Name 06/05/20 0913             Time Calculation- OT    OT Start Time  0913  -      OT Stop Time  0941  -      OT Time Calculation (min)  28 min  -      Total Timed Code Minutes- OT  28 minute(s)  -      TCU Minutes- OT  28 min  -      OT Received On  06/05/20  -      OT Goal Re-Cert Due Date  06/12/20  -        User Key  (r) = Recorded By, (t) = Taken By, (c) = Cosigned By    Initials Name Provider Type     Gilberto Benson COTA/L Occupational Therapy Assistant        Therapy Charges for Today     Code Description Service Date Service Provider Modifiers Qty    91650321421 HC OT SELF CARE/MGMT/TRAIN EA 15 MIN 6/4/2020 Gilberto Benson COTA/L GO 3    47149370334 HC OT THER PROC EA 15 MIN 6/5/2020 Gilberto Benson COTA/L GO 2               MARILIN Pandey  6/5/2020

## 2020-06-05 NOTE — PROGRESS NOTES
[facility] Medicine Services  INPATIENT PROGRESS NOTE    Length of Stay: 5  Date of Admission: 5/31/2020  Primary Care Physician: Provider, No Known    Subjective   Chief Complaint: Follow-up shortness of breath  HPI   Sitting up on side of bed.  Oxygen at 4 L.  Wore trilogy last night.  Feels as though she slept better with her trilogy machine.  No sputum production.  More expiratory wheezes noted today.  Patient is able to ambulate with physical therapy but requires frequent rest periods during ambulation.  She plans to stay with her sister, Mariaa for the next month.  She has no primary care provider locally.  Attempted to arrange home health    Review of Systems     All pertinent negatives and positives are as above. All other systems have been reviewed and are negative unless otherwise stated.     Objective    Temp:  [97.6 °F (36.4 °C)-97.9 °F (36.6 °C)] 97.9 °F (36.6 °C)  Heart Rate:  [] 102  Resp:  [18-20] 18  BP: (136-157)/(58-72) 141/58  Physical Exam  Constitutional: She is oriented to person, place, and time.   No distress.  Sitting up in chair.  Oxygen at 4 L.   Eyes: Pupils are equal, round, and reactive to light. EOM are normal.   Neck: Normal range of motion. Neck supple.   Cardiovascular: Normal rate, regular rhythm, normal heart sounds and intact distal pulses. Exam reveals no gallop and no friction rub.   No murmur heard.  Normal sinus rhythm 100 on telemetry   Pulmonary/Chest: She has wheezes (expiratory wheezes more today.).   Oxygen at 4 L.   Abdominal: Soft. Bowel sounds are normal. She exhibits no distension. There is no tenderness.   Genitourinary:   Genitourinary Comments: Voiding.   Musculoskeletal: Normal range of motion. She exhibits no edema, tenderness or deformity.   Neurological: She is alert and oriented to person, place, and time.   Skin: Skin is warm and dry. No rash noted. No erythema. No pallor.   Psychiatric: She has a normal mood and  affect. Her behavior is normal. Judgment and thought content normal.      Results Review:  I have reviewed the labs, radiology results, and diagnostic studies.    Laboratory Data:   Results from last 7 days   Lab Units 06/04/20  0700 05/31/20  1236   WBC 10*3/mm3 13.95* 11.22*   HEMOGLOBIN g/dL 12.2 12.4   HEMATOCRIT % 38.3 41.2   PLATELETS 10*3/mm3 295 307        Results from last 7 days   Lab Units 06/04/20  0700 05/31/20  1255 05/31/20  1236   SODIUM mmol/L 138  --  141   SODIUM, ARTERIAL mmol/L  --  141  --    POTASSIUM mmol/L 4.4  --  4.4   CHLORIDE mmol/L 93*  --  98   CO2 mmol/L 31.0*  --  27.0   BUN mg/dL 28*  --  16   CREATININE mg/dL 0.58  --  0.65   CALCIUM mg/dL 9.6  --  9.5   BILIRUBIN mg/dL  --   --  0.2   ALK PHOS U/L  --   --  95   ALT (SGPT) U/L  --   --  11   AST (SGOT) U/L  --   --  19   GLUCOSE mg/dL 289*  --  216*     No results found for: BLOODCX, URINECX, WOUNDCX, MRSACX, RESPCX, STOOLCX  Imaging Results (All)     Procedure Component Value Units Date/Time    XR Chest 1 View [[id number]] Collected:  05/31/20 1359     Updated:  05/31/20 1404    Narrative:       EXAMINATION: Chest 1 view 05/31/2020     HISTORY: CHF/COPD protocol     FINDINGS: Today's exam is compared to previous study of 07/06/2019.  Lungs are mildly hyperinflated. There is no evidence of lobar pneumonia  or effusion. There is atheromatous calcification of the aortic arch.       Impression:       . No acute disease.  This report was finalized on 05/31/2020 14:01 by Dr. Beau Bills MD.        Intake/Output    Intake/Output Summary (Last 24 hours) at 6/5/2020 1349  Last data filed at 6/5/2020 1300  Gross per 24 hour   Intake 500 ml   Output 1700 ml   Net -1200 ml       Scheduled Meds    arformoterol 15 mcg Nebulization BID - RT   atorvastatin 80 mg Oral Nightly   azithromycin 500 mg Oral Q24H   budesonide 0.5 mg Nebulization Daily - RT   clopidogrel 75 mg Oral Daily   dilTIAZem  mg Oral Q AM   escitalopram 10 mg Oral  Daily   fluticasone 2 spray Nasal Daily   guaiFENesin 1,200 mg Oral Q12H   insulin lispro 4-24 Units Subcutaneous TID AC   ipratropium 0.5 mg Nebulization 4x Daily - RT   levalbuterol 1.25 mg Nebulization 4x Daily - RT   metFORMIN 500 mg Oral BID With Meals   methylPREDNISolone sodium succinate 40 mg Intravenous Q12H   montelukast 10 mg Oral Nightly   pantoprazole 40 mg Oral Daily       I have reviewed the patient current medications.     Assessment/Plan     Active Hospital Problems    Diagnosis   • COPD exacerbation (CMS/[facility])   • Acute on chronic respiratory failure with hypoxia and hypercapnia (CMS/[facility])   • CAD (coronary artery disease)   • Diabetes mellitus (CMS/[facility])     Plan:  1.  Admitted 5/31/2020 with shortness of breath cough.  Chest x-ray showed no acute process.  Admitted with exacerbation of chronic obstructive pulmonary disease.  On arrival to the floor, saturation noted to be in the low 80s on 4 L.  Rapid response team activated.  Patient placed on BiPAP with improvement of symptoms.  2.  Currently on 4 L oxygen which is baseline.  DME company adjusted settings on astral trilogy and patient feels as though she rested better.  3.  Azithromycin oral. Respiratory PCR negative, COVID 19  negative  4.  Solu-Medrol to 40 mg every 12 hours.  Transition to oral prednisone at discharge.  Continue Xopenex/Atrovent, Pulmicort, Brovana, Mucinex, flutter valve  5.  Glucoses 272, 301, 330, 204.  Sliding scale insulin coverage.  Metformin.  6.  Physical therapy.  Ambulated 65 feet with 3 standing rest.  Quad cane and contact-guard.  She will be staying with her sister, Mariaa for the next month as patient lives in [place].  We will try to arrange home health at discharge but may be difficult as no primary care provider local.   7.  Wheezing more today.  Would like patient to ambulate more and be closer to baseline prior to discharge.  Hopefully discharge in 1 to 2 days.      Her sister, Mariaa will assist with  decision-making if she is unable to make decisions for herself.  The above documentation resulted from a face-to-face encounter by me Laura OCAMPO, [facility].    Discharge Planning: I expect patient to be discharged to home with sister and home health if able to arrange in 1-2 days.    TERRENCE Goode   06/05/20   13:49    I personally evaluated and examined the patient in conjunction with TERRENCE Vidal and agree with the assessment, treatment plan, and disposition of the patient as recorded by her. My history, exam, and further recommendations are:     Patient seen and examined.  Says she feels about 70 to 80% of her baseline.  Still getting easily dyspneic on exertion.  Nursing confirms this.  Is ambulating around pretty well however with walker.    On exam heart is regular rate and rhythm.  Lungs are still diminished with minimal end expiratory wheeze on the left greater than right.  Lower extremities any cyanosis or edema    Overall patient continues to improve.  Unclear what her baseline exam is.  However she has not quite yet feel back to baseline and is still gets pretty dyspneic with exertion.  She is on her baseline home O2 otherwise though.  We will continue here with treatments hopefully discharge in the next 1 to 2 days.  Patient is from out of town without any local providers and is a high risk for readmission if discharged too soon.      Arsh Roman DO  06/05/20  16:38

## 2020-06-05 NOTE — PLAN OF CARE
Problem: Patient Care Overview  Goal: Plan of Care Review  Outcome: Ongoing (interventions implemented as appropriate)  Flowsheets (Taken 6/5/2020 1124)  Progress: improving  Plan of Care Reviewed With: patient  Note:   Pt. Able to perform ue exs to increase her act. Ran with adl tasks, pt. Has no issues with ot poc tasks, gets sob occasionally and requires frequent rests!

## 2020-06-05 NOTE — PLAN OF CARE
Problem: Patient Care Overview  Goal: Plan of Care Review  Outcome: Ongoing (interventions implemented as appropriate)  Flowsheets (Taken 6/5/2020 2799)  Progress: no change  Plan of Care Reviewed With: patient  Outcome Summary: No c/o voiced. Wearing home bipap while sleeping. O2 switch to nasal cannula to ambulate to BR becomes soa with activity resolves with rest. -110 per tele

## 2020-06-05 NOTE — THERAPY TREATMENT NOTE
Acute Care - Physical Therapy Treatment Note  [facility]     Patient Name: Jessica Gregg  : 1945  MRN: [id number]  Today's Date: 2020  Onset of Illness/Injury or Date of Surgery: 20     Referring Physician: Dr. Oglesby    Admit Date: 2020    Visit Dx:    ICD-10-CM ICD-9-CM   1. COPD exacerbation (CMS/HCC) J44.1 491.21   2. Impaired functional mobility and activity tolerance Z74.09 V49.89   3. Decreased activities of daily living (ADL) Z78.9 V49.89     Patient Active Problem List   Diagnosis   • Chronic obstructive pulmonary disease with acute exacerbation (CMS/HCC)   • CHF (congestive heart failure) (CMS/HCC)   • Diabetes mellitus (CMS/HCC)   • CAD (coronary artery disease)   • COPD exacerbation (CMS/HCC)   • Acute on chronic respiratory failure with hypoxia and hypercapnia (CMS/[facility])       Therapy Treatment    Rehabilitation Treatment Summary     Row Name 20 1525 20 0913          Treatment Time/Intention    Discipline  physical therapy assistant  -  occupational therapy assistant  -CJ     Document Type  therapy note (daily note)  -  therapy note (daily note)  -CJ     Subjective Information  complains of;weakness;dyspnea  -2  complains of;weakness;fatigue  -CJ     Mode of Treatment  physical therapy  -  occupational therapy  -CJ     Patient Effort  --  good  -CJ     Existing Precautions/Restrictions  fall;oxygen therapy device and L/min  -  fall;oxygen therapy device and L/min  -CJ     Recorded by [LC] Carmina Leija, PREMA 20 1525  [LC2] Carmina Leija PTA 20 1551 [CJ] Gilberto Benson COTA/L 20 1123     Row Name 20 1525 20 0913          Bed Mobility Assessment/Treatment    [place] Level (Bed Mobility)  independent  -  --     Supine-Sit [place] (Bed Mobility)  independent  -LC  --     Sit-Supine [place] (Bed Mobility)  independent  -  --     Comment (Bed Mobility)  --  fowlers in bed!  -CJ     Recorded by [LC] Liss  Carmina SANCHEZ, [facility] 06/05/20 1551 [CJ] Gilberto Benson COTA/L 06/05/20 1123     Row Name 06/05/20 1525             Sit-Stand Transfer    Sit-Stand [place] (Transfers)  supervision  -      Recorded by [LC] Carmina Leija, PTA 06/05/20 1551      Row Name 06/05/20 1525             Stand-Sit Transfer    Stand-Sit [place] (Transfers)  supervision  -LC      Recorded by [LC] Carmina Leija, [facility] 06/05/20 1551      Row Name 06/05/20 1525             Toilet Transfer    Type (Toilet Transfer)  stand-sit;sit-stand  -      [place] Level (Toilet Transfer)  supervision  -LC      Recorded by [LC] Carmina Leija, [facility] 06/05/20 1551      Row Name 06/05/20 1525             Gait/Stairs Assessment/Training    [place] Level (Gait)  verbal cues;contact guard  -LC      Assistive Device (Gait)  cane, quad  -LC      Distance in Feet (Gait)  65'  4 standing rest breaks  -LC      Pattern (Gait)  step-to  -LC      Deviations/Abnormal Patterns (Gait)  c[place] decreased;stride length decreased  -LC      Bilateral Gait Deviations  forward flexed posture  -LC      Comment (Gait/Stairs)  cues for quad cane sequencing   -      Recorded by [LC] Carmina Leija, [facility] 06/05/20 1551      Row Name 06/05/20 0913             Motor Skills Assessment/Interventions    Additional Documentation  Therapeutic Exercise (Group)  -      Recorded by [CJ] Gilberto Benson COTA/L 06/05/20 1123      Row Name 06/05/20 0913             Therapeutic Exercise    Upper Extremity (Therapeutic Exercise)  bicep curl, bilateral;wand exercises  -      Upper Extremity Range of Motion (Therapeutic Exercise)  shoulder abduction/adduction, bilateral;elbow flexion/extension, bilateral;wrist flexion/extension, bilateral  -      Weight/Resistance (Therapeutic Exercise)  2 pounds;3 pounds  -      Exercise Type (Therapeutic Exercise)  AROM (active range of motion)  -      Position (Therapeutic Exercise)  seated  -      Sets/Reps (Therapeutic Exercise)  2 sets  x 20 reps!  -      Equipment (Therapeutic Exercise)  dowel blu/wand;free weight, barbell  -      Expected Outcome (Therapeutic Exercise)  facilitate normal movement patterns;improve functional stability;improve functional tolerance, self-care activity;improve motor control;improve neuromuscular control;improve performance, BADLs;improve performance, fine motor coordination skills;improve performance, gait skills;improve performance, transfer skills;improve postural control;increase active range of motion  -CJ      Recorded by [CJ] Gilberto Benson COTA/L 06/05/20 1123      Row Name 06/05/20 1525 06/05/20 0913          Positioning and Restraints    Pre-Treatment Position  in bed  -LC  in bed  -CJ     Post Treatment Position  bed  -  bed  -CJ     In Bed  fowlers;call light within reach;encouraged to call for assist  -  fowlers;call light within reach;encouraged to call for assist;side rails up x2  -CJ     Recorded by [LC] Carmina Leija, PTA 06/05/20 1551 [CJ] Gilberto Benson COTA/L 06/05/20 1123     Row Name 06/05/20 0913             Pain Assessment    Additional Documentation  Pain Scale 2: Word Pre/Post-Treatment (Group)  -CJ      Recorded by [CJ] Gilberto Benson COTA/L 06/05/20 1123      Row Name 06/05/20 1525 06/05/20 0913          Pain Scale: Numbers Pre/Post-Treatment    Pain Scale: Numbers, Pretreatment  0/10 - no pain  -  0/10 - no pain  -     Pain Scale: Numbers, Post-Treatment  0/10 - no pain  -  0/10 - no pain  -     Pre/Post Treatment Pain Comment  c/o SOA with ambulation  -  --     Recorded by [] Carmina Leija, PTA 06/05/20 1551 [CJ] Gilberto Benson RILEY/L 06/05/20 1123     Row Name 06/05/20 1525             Plan of Care Review    Plan of Care Reviewed With  patient  -      Progress  improving  -      Outcome Summary  PT tx completed. I for bed mobility S for sit to stands and commode transfer with grab bar. CGA for ambulation with quad cane with cues for sequencing  and safety. Required 4 standing rest breaks for 65'. Will cont to progress with strengthening and endurance for improved functional mobility.    -      Recorded by [LC] Carmina Leija, PTA 06/05/20 1551      Row Name 06/05/20 0913             Outcome Summary/Treatment Plan (OT)    Daily Summary of Progress (OT)  progress toward functional goals is good  -      Barriers to Overall Progress (OT)  Fall/o2!  -CJ      Plan for Continued Treatment (OT)  continue with ot poc!  -      Recorded by [CJ] Giblerto Benson COTA/ANTIONE 06/05/20 1123        User Key  (r) = Recorded By, (t) = Taken By, (c) = Cosigned By    Initials Name Effective Dates Discipline     Gilberto Benson RILEY/L 08/02/16 -  OT     Carmina Leija, PTA 10/18/19 -  PT                   Physical Therapy Education                 Title: PT OT SLP Therapies (In Progress)     Topic: Physical Therapy (In Progress)     Point: Mobility training (Done)     Description:   Instruct learner(s) on safety and technique for assisting patient out of bed, chair or wheelchair.  Instruct in the proper use of assistive devices, such as walker, crutches, cane or brace.              Patient Friendly Description:   It's important to get you on your feet again, but we need to do so in a way that is safe for you. Falling has serious consequences, and your personal safety is the most important thing of all.        When it's time to get out of bed, one of us or a family member will sit next to you on the bed to give you support.     If your doctor or nurse tells you to use a walker, crutches, a cane, or a brace, be sure you use it every time you get out of bed, even if you think you don't need it.    Learning Progress Summary           Patient Acceptance, CLIFT[employer] by BRITTON at 6/2/2020 0800    Comment:  Educated pt on progression of PT POC and benefits of activity                   Point: Home exercise program (Not Started)     Description:   Instruct learner(s) on appropriate  technique for monitoring, assisting and/or progressing patient with therapeutic exercises and activities.              Learner Progress:   Not documented in this visit.          Point: Body mechanics (Not Started)     Description:   Instruct learner(s) on proper positioning and spine alignment for patient and/or caregiver during mobility tasks and/or exercises.              Learner Progress:   Not documented in this visit.          Point: Precautions (Not Started)     Description:   Instruct learner(s) on prescribed precautions during mobility and gait tasks              Learner Progress:   Not documented in this visit.                      User Key     Initials Effective Dates Name Provider Type Discipline    BRITTON 08/02/16 -  Zach Yang, PT DPT Physical Therapist PT                PT Recommendation and Plan     Plan of Care Reviewed With: patient  Progress: improving  Outcome Summary: PT tx completed. I for bed mobility S for sit to stands and commode transfer with grab bar. CGA for ambulation with quad cane with cues for sequencing and safety. Required 4 standing rest breaks for 65'. Will cont to progress with strengthening and endurance for improved functional mobility.    Outcome Measures     Row Name 06/05/20 0913 06/04/20 0802 06/03/20 0730       How much help from another is currently needed...    Putting on and taking off regular lower body clothing?  3  -CJ  3  -CJ  3  -CJ    Bathing (including washing, rinsing, and drying)  3  -CJ  3  -CJ  3  -CJ    Toileting (which includes using toilet bed pan or urinal)  3  -CJ  3  -CJ  3  -CJ    Putting on and taking off regular upper body clothing  3  -CJ  3  -CJ  3  -CJ    Taking care of personal grooming (such as brushing teeth)  3  -CJ  3  -CJ  3  -CJ    Eating meals  3  -CJ  3  -CJ  3  -CJ    AM-PAC 6 Clicks Score (OT)  18  -CJ  18  -CJ  18  -CJ       Functional Assessment    Outcome Measure Options  AM-PAC 6 Clicks Daily Activity (OT)  -CJ  AM-PAC 6 Clicks  Daily Activity (OT)  -  AM-PAC 6 Clicks Daily Activity (OT)  -      User Key  (r) = Recorded By, (t) = Taken By, (c) = Cosigned By    Initials Name Provider Type     Gilberto Benson COTA/L Occupational Therapy Assistant         Time Calculation:   PT Charges     Row Name 06/05/20 1551             Time Calculation    Start Time  1525  -LC      Stop Time  1549  -      Time Calculation (min)  24 min  -      PT Received On  06/05/20  -         Time Calculation- PT    Total Timed Code Minutes- PT  24 minute(s)  -        User Key  (r) = Recorded By, (t) = Taken By, (c) = Cosigned By    Initials Name Provider Type    Carmina Hobson PTA Physical Therapy Assistant        Therapy Charges for Today     Code Description Service Date Service Provider Modifiers Qty    [id number] HC GAIT TRAINING EA 15 MIN 6/5/2020 Carmina Leija PTA GP 2          PT G-Codes  Outcome Measure Options: AM-PAC 6 Clicks Daily Activity (OT)  AM-PAC 6 Clicks Score (PT): 18  AM-PAC 6 Clicks Score (OT): 18    Carmina Leija PTA  6/5/2020

## 2020-06-05 NOTE — PLAN OF CARE
Problem: Patient Care Overview  Goal: Plan of Care Review  Flowsheets  Taken 6/5/2020 1552  Progress: improving  Taken 6/5/2020 1525  Plan of Care Reviewed With: patient  Outcome Summary: PT tx completed. I for bed mobility S for sit to stands and commode transfer with grab bar. CGA for ambulation with quad cane with cues for sequencing and safety. Required 4 standing rest breaks for 65'. Will cont to progress with strengthening and endurance for improved functional mobility.

## 2020-06-06 VITALS
OXYGEN SATURATION: 94 % | HEIGHT: 60 IN | RESPIRATION RATE: 18 BRPM | HEART RATE: 95 BPM | BODY MASS INDEX: 24.97 KG/M2 | SYSTOLIC BLOOD PRESSURE: 149 MMHG | TEMPERATURE: 97.9 F | WEIGHT: 127.2 LBS | DIASTOLIC BLOOD PRESSURE: 73 MMHG

## 2020-06-06 LAB — GLUCOSE BLDC GLUCOMTR-MCNC: 326 MG/DL (ref 70–130)

## 2020-06-06 PROCEDURE — 82962 GLUCOSE BLOOD TEST: CPT

## 2020-06-06 PROCEDURE — 25010000002 METHYLPREDNISOLONE PER 40 MG: Performed by: NURSE PRACTITIONER

## 2020-06-06 PROCEDURE — 94799 UNLISTED PULMONARY SVC/PX: CPT

## 2020-06-06 PROCEDURE — 63710000001 INSULIN LISPRO (HUMAN) PER 5 UNITS: Performed by: NURSE PRACTITIONER

## 2020-06-06 RX ORDER — BENZONATATE 200 MG/1
200 CAPSULE ORAL 3 TIMES DAILY PRN
Qty: 9 CAPSULE | Refills: 0 | Status: SHIPPED | OUTPATIENT
Start: 2020-06-06 | End: 2020-06-15 | Stop reason: SDUPTHER

## 2020-06-06 RX ORDER — GUAIFENESIN 600 MG/1
1200 TABLET, EXTENDED RELEASE ORAL EVERY 12 HOURS SCHEDULED
Qty: 20 TABLET | Refills: 0 | Status: SHIPPED | OUTPATIENT
Start: 2020-06-06 | End: 2020-06-15 | Stop reason: SDUPTHER

## 2020-06-06 RX ORDER — MONTELUKAST SODIUM 10 MG/1
10 TABLET ORAL NIGHTLY
Qty: 30 TABLET | Refills: 0 | Status: ON HOLD | OUTPATIENT
Start: 2020-06-06 | End: 2020-07-11

## 2020-06-06 RX ORDER — PREDNISONE 10 MG/1
TABLET ORAL
Qty: 21 TABLET | Refills: 0 | Status: SHIPPED | OUTPATIENT
Start: 2020-06-06 | End: 2020-06-15

## 2020-06-06 RX ADMIN — METHYLPREDNISOLONE SODIUM SUCCINATE 40 MG: 40 INJECTION, POWDER, FOR SOLUTION INTRAMUSCULAR; INTRAVENOUS at 08:20

## 2020-06-06 RX ADMIN — IPRATROPIUM BROMIDE 0.5 MG: 0.5 SOLUTION RESPIRATORY (INHALATION) at 06:48

## 2020-06-06 RX ADMIN — SODIUM CHLORIDE, PRESERVATIVE FREE 10 ML: 5 INJECTION INTRAVENOUS at 08:21

## 2020-06-06 RX ADMIN — FLUTICASONE PROPIONATE 2 SPRAY: 50 SPRAY, METERED NASAL at 10:50

## 2020-06-06 RX ADMIN — METFORMIN HYDROCHLORIDE 500 MG: 500 TABLET ORAL at 08:20

## 2020-06-06 RX ADMIN — LEVALBUTEROL 1.25 MG: 1.25 SOLUTION, CONCENTRATE RESPIRATORY (INHALATION) at 06:48

## 2020-06-06 RX ADMIN — DILTIAZEM HYDROCHLORIDE 120 MG: 120 CAPSULE, COATED, EXTENDED RELEASE ORAL at 05:37

## 2020-06-06 RX ADMIN — CLOPIDOGREL 75 MG: 75 TABLET, FILM COATED ORAL at 08:20

## 2020-06-06 RX ADMIN — ARFORMOTEROL TARTRATE 15 MCG: 15 SOLUTION RESPIRATORY (INHALATION) at 06:48

## 2020-06-06 RX ADMIN — LEVALBUTEROL 1.25 MG: 1.25 SOLUTION, CONCENTRATE RESPIRATORY (INHALATION) at 10:32

## 2020-06-06 RX ADMIN — IPRATROPIUM BROMIDE 0.5 MG: 0.5 SOLUTION RESPIRATORY (INHALATION) at 10:31

## 2020-06-06 RX ADMIN — BUDESONIDE 0.5 MG: 0.5 INHALANT RESPIRATORY (INHALATION) at 06:52

## 2020-06-06 RX ADMIN — PANTOPRAZOLE SODIUM 40 MG: 40 TABLET, DELAYED RELEASE ORAL at 08:20

## 2020-06-06 RX ADMIN — INSULIN LISPRO 16 UNITS: 100 INJECTION, SOLUTION INTRAVENOUS; SUBCUTANEOUS at 08:21

## 2020-06-06 RX ADMIN — GUAIFENESIN 1200 MG: 600 TABLET, EXTENDED RELEASE ORAL at 08:20

## 2020-06-06 RX ADMIN — AZITHROMYCIN 500 MG: 250 TABLET, FILM COATED ORAL at 08:20

## 2020-06-06 RX ADMIN — ESCITALOPRAM 10 MG: 10 TABLET, FILM COATED ORAL at 08:20

## 2020-06-06 NOTE — DISCHARGE SUMMARY
[facility] Medicine Services  DISCHARGE SUMMARY     Date of Admission: 5/31/2020  Date of Discharge:  6/6/2020  Primary Care Physician: Chandan, Sushma, MD    Presenting Problem/History of Present Illness:  COPD exacerbation (CMS/[facility]) [J44.1]     Discharge Diagnoses:  Active Hospital Problems    Diagnosis   • **Acute on chronic respiratory failure with hypoxia and hypercapnia (CMS/[facility])   • COPD exacerbation (CMS/[facility])   • CAD (coronary artery disease)   • Type 2 diabetes mellitus, without long-term current use of insulin (CMS/[facility])     Chief Complaint on Day of Discharge: Feels as though she is close to baseline and wants to go home today.  History of Present Illness on Day of Discharge: Sitting up on side of bed.  Oxygen at 4 L.  Breath sounds decreased.  Faint expiratory wheeze anterior.  No wheezing heard posterior.  Patient ambulated in the hallway 3-4 times yesterday and reported she feels as though she is close to baseline.  Overall no sputum production and she feels that C she continues to improve daily.  She is asking to go home today.  She plans to stay locally with her sister for a month before returning back home to [place].    Consults: None    Procedures Performed: None    Pertinent Test Results:   Laboratory Data:    Results from last 7 days   Lab Units 06/04/20  0700 05/31/20  1236   WBC 10*3/mm3 13.95* 11.22*   HEMOGLOBIN g/dL 12.2 12.4   HEMATOCRIT % 38.3 41.2   PLATELETS 10*3/mm3 295 307        Results from last 7 days   Lab Units 06/04/20  0700 05/31/20  1255 05/31/20  1236   SODIUM mmol/L 138  --  141   SODIUM, ARTERIAL mmol/L  --  141  --    POTASSIUM mmol/L 4.4  --  4.4   CHLORIDE mmol/L 93*  --  98   CO2 mmol/L 31.0*  --  27.0   BUN mg/dL 28*  --  16   CREATININE mg/dL 0.58  --  0.65   CALCIUM mg/dL 9.6  --  9.5   BILIRUBIN mg/dL  --   --  0.2   ALK PHOS U/L  --   --  95   ALT (SGPT) U/L  --   --  11   AST (SGOT) U/L  --   --  19   GLUCOSE mg/dL 289*  --   216*     Lab Results (all)     Procedure Component Value Units Date/Time    POC Glucose Once [[id number]]  (Abnormal) Collected:  06/06/20 0757    Specimen:  Blood Updated:  06/06/20 0809     Glucose 326 mg/dL      Comment: : [id number] Silas QuezadaMeter ID: [id number]       POC Glucose Once [[id number]]  (Abnormal) Collected:  06/05/20 1643    Specimen:  Blood Updated:  06/05/20 1705     Glucose 204 mg/dL      Comment: : [id number] Mendez ChristinaMeter ID: [id number]       POC Glucose Once [[id number]]  (Abnormal) Collected:  06/05/20 1141    Specimen:  Blood Updated:  06/05/20 1213     Glucose 272 mg/dL      Comment: : [id number] Mendez ChristinaMeter ID: [id number]       POC Glucose Once [[id number]]  (Abnormal) Collected:  06/05/20 0749    Specimen:  Blood Updated:  06/05/20 0807     Glucose 301 mg/dL      Comment: : [id number] Andrea HerbinaMeter ID: [id number]       POC Glucose Once [[id number]]  (Abnormal) Collected:  06/04/20 1617    Specimen:  Blood Updated:  06/04/20 1628     Glucose 330 mg/dL      Comment: : [id number] Drew SPOTBY.COMMeter ID: [id number]       POC Glucose Once [[id number]]  (Abnormal) Collected:  06/04/20 1157    Specimen:  Blood Updated:  06/04/20 1209     Glucose 204 mg/dL      Comment: : [id number] RealMatchMeter ID: [id number]       POC Glucose Once [[id number]]  (Abnormal) Collected:  06/04/20 0720    Specimen:  Blood Updated:  06/04/20 0743     Glucose 284 mg/dL      Comment: : [id number] RealMatchMeter ID: [id number]       Basic Metabolic Panel [[id number]]  (Abnormal) Collected:  06/04/20 0700    Specimen:  Blood Updated:  06/04/20 0731     Glucose 289 mg/dL      BUN 28 mg/dL      Creatinine 0.58 mg/dL      Sodium 138 mmol/L      Potassium 4.4 mmol/L      Chloride 93 mmol/L      CO2 31.0 mmol/L      Calcium 9.6 mg/dL      eGFR Non African Amer 102 mL/min/1.73      BUN/Creatinine Ratio 48.3     Anion Gap 14.0 mmol/L     Narrative:       GFR Normal  >60  Chronic Kidney Disease <60  Kidney Failure <15      CBC & Differential [[id number]] Collected:  06/04/20 0700    Specimen:  Blood Updated:  06/04/20 0715    Narrative:       The following orders were created for panel order CBC & Differential.  Procedure                               Abnormality         Status                     ---------                               -----------         ------                     CBC Auto Differential[[id number]]        Abnormal            Final result                 Please view results for these tests on the individual orders.    CBC Auto Differential [[id number]]  (Abnormal) Collected:  06/04/20 0700    Specimen:  Blood Updated:  06/04/20 0715     WBC 13.95 10*3/mm3      RBC 4.38 10*6/mm3      Hemoglobin 12.2 g/dL      Hematocrit 38.3 %      MCV 87.4 fL      MCH 27.9 pg      MCHC 31.9 g/dL      RDW 13.8 %      RDW-SD 44.7 fl      MPV 10.0 fL      Platelets 295 10*3/mm3      Neutrophil % 91.5 %      Lymphocyte % 4.4 %      Monocyte % 3.4 %      Eosinophil % 0.0 %      Basophil % 0.1 %      Immature Grans % 0.6 %      Neutrophils, Absolute 12.75 10*3/mm3      Lymphocytes, Absolute 0.62 10*3/mm3      Monocytes, Absolute 0.48 10*3/mm3      Eosinophils, Absolute 0.00 10*3/mm3      Basophils, Absolute 0.01 10*3/mm3      Immature Grans, Absolute 0.09 10*3/mm3      nRBC 0.0 /100 WBC     POC Glucose Once [[id number]]  (Abnormal) Collected:  06/03/20 1646    Specimen:  Blood Updated:  06/03/20 1658     Glucose 305 mg/dL      Comment: : [id number] Rick EuraisaMeter ID: [id number]       POC Glucose Once [[id number]]  (Abnormal) Collected:  06/03/20 1135    Specimen:  Blood Updated:  06/03/20 1146     Glucose 323 mg/dL      Comment: : [id number] Belen Alvarez ID: [id number]       POC Glucose Once [[id number]]  (Abnormal) Collected:  06/03/20 0745    Specimen:  Blood Updated:  06/03/20 0756     Glucose 289 mg/dL      Comment: : [id number] Belen [email or web address] ID:  [id number]       POC Glucose Once [[id number]]  (Abnormal) Collected:  06/02/20 1601    Specimen:  Blood Updated:  06/02/20 1612     Glucose 273 mg/dL      Comment: : [id number] WhitneyMeter ID: [id number]       POC Glucose Once [[id number]]  (Abnormal) Collected:  06/02/20 1135    Specimen:  Blood Updated:  06/02/20 1145     Glucose 263 mg/dL      Comment: : [id number] WhitneyMeter ID: [id number]       POC Glucose Once [[id number]]  (Abnormal) Collected:  06/02/20 0740    Specimen:  Blood Updated:  06/02/20 0756     Glucose 276 mg/dL      Comment: : [id number] WhitneyMeter ID: [id number]       POC Glucose Once [[id number]]  (Abnormal) Collected:  06/01/20 1642    Specimen:  Blood Updated:  06/01/20 1704     Glucose 252 mg/dL      Comment: : [id number] Coy (DeBoe) AutumnMeter ID: [id number]       POC Glucose Once [[id number]]  (Abnormal) Collected:  06/01/20 1156    Specimen:  Blood Updated:  06/01/20 1208     Glucose 289 mg/dL      Comment: : [id number] WhitneyMeter ID: [id number]       POC Glucose Once [[id number]]  (Abnormal) Collected:  06/01/20 0758    Specimen:  Blood Updated:  06/01/20 0809     Glucose 211 mg/dL      Comment: : [id number] WhitneyMeter ID: [id number]       Blood Gas, Arterial [[id number]]  (Abnormal) Collected:  05/31/20 1810    Specimen:  Arterial Blood Updated:  05/31/20 1815     Site Right Radial     Aneesh's Test Positive     pH, Arterial 7.309 pH units      Comment: 84 Value below reference range        pCO2, Arterial 59.2 mm Hg      Comment: 83 Value above reference range        pO2, Arterial 96.3 mm Hg      HCO3, Arterial 29.7 mmol/L      Comment: 83 Value above reference range        Base Excess, Arterial 2.2 mmol/L      Comment: 83 Value above reference range        O2 Saturation, Arterial 97.0 %      Temperature 37.0 C      Barometric Pressure for Blood Gas 755 mmHg      Modality BiPap     FIO2 35 %      Ventilator Mode NA     Set  [facility] Resp Rate 18.0     IPAP 16     Comment: Meter: [id number]     :  [id number]        EPAP 8     Collected by [id number]    POC Glucose Once [[id number]]  (Abnormal) Collected:  05/31/20 1642    Specimen:  Blood Updated:  05/31/20 1653     Glucose 232 mg/dL      Comment: : [id number] Perez SheenaMeter ID: [id number]       COVID-19,CEPHEID,COR/CHARLINE/PAD IN-HOUSE(OR EMERGENT/ADD-ON),NP SWAB IN TRANSPORT MEDIA 3-4 HR TAT - Swab, Nasopharynx [[id number]]  (Normal) Collected:  05/31/20 1349    Specimen:  Swab from Nasopharynx Updated:  05/31/20 1447     COVID19 Not Detected    Respiratory Panel, PCR - Swab, Nasopharynx [[id number]]  (Normal) Collected:  05/31/20 1349    Specimen:  Swab from Nasopharynx Updated:  05/31/20 1446     ADENOVIRUS, PCR Not Detected     Coronavirus 229E Not Detected     Coronavirus HKU1 Not Detected     Coronavirus NL63 Not Detected     Coronavirus OC43 Not Detected     Human Metapneumovirus Not Detected     Human Rhinovirus/Enterovirus Not Detected     Influenza B PCR Not Detected     Parainfluenza Virus 1 Not Detected     Parainfluenza Virus 2 Not Detected     Parainfluenza Virus 3 Not Detected     Parainfluenza Virus 4 Not Detected     Bordetella pertussis pcr Not Detected     Influenza A H1 2009 PCR Not Detected     Chlamydophila pneumoniae PCR Not Detected     Mycoplasma pneumo by PCR Not Detected     Influenza A PCR Not Detected     Influenza A H3 Not Detected     Influenza A H1 Not Detected     RSV, PCR Not Detected     Bordetella parapertussis PCR Not Detected    Narrative:       The coronavirus on the RVP is NOT COVID-19 and is NOT indicative of infection with COVID-19.     [place] Draw [[id number]] Collected:  05/31/20 1025    Specimen:  Blood Updated:  05/31/20 1345    Narrative:       The following orders were created for panel order [place] Draw.  Procedure                               Abnormality         Status                     ---------                                -----------         ------                     Light Blue Top[[id number]]                                   Final result               Green Top (Gel)[[id number]]                                  Final result               Lavender Top[[id number]]                                     Final result               Red Top[[id number]]                                          Final result               Evans Blood Culture Cornelius...[[id number]]                      Final result               Gray Top - Ice[[id number]]                                   Final result                 Please view results for these tests on the individual orders.    Light Blue Top [[id number]] Collected:  05/31/20 1236    Specimen:  Blood Updated:  05/31/20 1345     Extra Tube hold for add-on     Comment: Auto resulted       Green Top (Gel) [[id number]] Collected:  05/31/20 1236    Specimen:  Blood Updated:  05/31/20 1345     Extra Tube Hold for add-ons.     Comment: Auto resulted.       Lavender Top [[id number]] Collected:  05/31/20 1236    Specimen:  Blood Updated:  05/31/20 1345     Extra Tube hold for add-on     Comment: Auto resulted       Red Top [[id number]] Collected:  05/31/20 1236    Specimen:  Blood Updated:  05/31/20 1345     Extra Tube Hold for add-ons.     Comment: Auto resulted.       Gray Top - Ice [[id number]] Collected:  05/31/20 1236    Specimen:  Blood Updated:  05/31/20 1345     Extra Tube Hold for add-ons.     Comment: Auto resulted.       Evans Blood Culture Bottle Set [[id number]] Collected:  05/31/20 1025    Specimen:  Blood from Arm, Left Updated:  05/31/20 1315     Extra Tube Hold for add-ons.     Comment: Auto resulted.       Comprehensive Metabolic Panel [[id number]]  (Abnormal) Collected:  05/31/20 1236    Specimen:  Blood Updated:  05/31/20 1313     Glucose 216 mg/dL      BUN 16 mg/dL      Creatinine 0.65 mg/dL      Sodium 141 mmol/L      Potassium 4.4 mmol/L      Chloride 98 mmol/L      CO2 27.0 mmol/L      Calcium 9.5 mg/dL         Total Protein 7.3 g/dL      Albumin 4.40 g/dL      ALT (SGPT) 11 U/L      AST (SGOT) 19 U/L      Alkaline Phosphatase 95 U/L      Total Bilirubin 0.2 mg/dL      eGFR Non African Amer 89 mL/min/1.73      Globulin 2.9 gm/dL      A/G Ratio 1.5 g/dL      BUN/Creatinine Ratio 24.6     Anion Gap 16.0 mmol/L     Narrative:       GFR Normal >60  Chronic Kidney Disease <60  Kidney Failure <15      Troponin [[id number]]  (Normal) Collected:  05/31/20 1236    Specimen:  Blood Updated:  05/31/20 1311     Troponin T <0.010 ng/mL     Narrative:       Troponin T Reference Range:  <= 0.03 ng/mL-   Negative for AMI  >0.03 ng/mL-     Abnormal for myocardial necrosis.  Clinicians would have to utilize clinical acumen, EKG, Troponin and serial changes to determine if it is an Acute Myocardial Infarction or myocardial injury due to an underlying chronic condition.       Results may be falsely decreased if patient taking Biotin.      BNP [[id number]]  (Normal) Collected:  05/31/20 1236    Specimen:  Blood Updated:  05/31/20 1310     proBNP 231.1 pg/mL     Narrative:       Among patients with dyspnea, NT-proBNP is highly sensitive for the detection of acute congestive heart failure. In addition NT-proBNP of <300 pg/ml effectively rules out acute congestive heart failure with 99% negative predictive value.    Results may be falsely decreased if patient taking Biotin.      Blood Gas, Arterial With Co-Ox [[id number]]  (Abnormal) Collected:  05/31/20 1255    Specimen:  Arterial Blood Updated:  05/31/20 1302     Site Right Brachial     Aneesh's Test N/A     pH, Arterial 7.309 pH units      Comment: 84 Value below reference range        pCO2, Arterial 59.8 mm Hg      Comment: 83 Value above reference range        pO2, Arterial 162.0 mm Hg      Comment: 83 Value above reference range        HCO3, Arterial 30.0 mmol/L      Comment: 83 Value above reference range        Base Excess, Arterial 2.3 mmol/L      Comment: 83 Value above reference range         O2 Saturation, Arterial 99.5 %      Comment: 83 Value above reference range        Hemoglobin, Blood Gas 13.2 g/dL      Hematocrit, Blood Gas 40.6 %      Oxyhemoglobin 97.9 %      Methemoglobin 0.70 %      Carboxyhemoglobin 0.9 %      A-a Gradiant --     Comment: UNABLE TO CALCULATE        Temperature 37.0 C      Sodium, Arterial 141 mmol/L      Potassium, Arterial 4.6 mmol/L      Barometric Pressure for Blood Gas 757 mmHg      Modality Nasal Cannula     Flow Rate 4.0 lpm      Ventilator Mode NA     Note --     Collected by [id number]     Comment: Meter: [id number]     :  [id number]        pH, Temp Corrected --     pCO2, Temperature Corrected --     pO2, Temperature Corrected --    CBC & Differential [[id number]] Collected:  05/31/20 1236    Specimen:  Blood Updated:  05/31/20 1258    Narrative:       The following orders were created for panel order CBC & Differential.  Procedure                               Abnormality         Status                     ---------                               -----------         ------                     CBC Auto Differential[[id number]]        Abnormal            Final result                 Please view results for these tests on the individual orders.    CBC Auto Differential [[id number]]  (Abnormal) Collected:  05/31/20 1236    Specimen:  Blood Updated:  05/31/20 1258     WBC 11.22 10*3/mm3      RBC 4.53 10*6/mm3      Hemoglobin 12.4 g/dL      Hematocrit 41.2 %      MCV 90.9 fL      MCH 27.4 pg      MCHC 30.1 g/dL      RDW 13.8 %      RDW-SD 46.1 fl      MPV 9.9 fL      Platelets 307 10*3/mm3      Neutrophil % 72.7 %      Lymphocyte % 14.9 %      Monocyte % 7.1 %      Eosinophil % 4.3 %      Basophil % 0.7 %      Immature Grans % 0.3 %      Neutrophils, Absolute 8.16 10*3/mm3      Lymphocytes, Absolute 1.67 10*3/mm3      Monocytes, Absolute 0.80 10*3/mm3      Eosinophils, Absolute 0.48 10*3/mm3      Basophils, Absolute 0.08 10*3/mm3      Immature Grans, Absolute  0.03 10*3/mm3      nRBC 0.0 /100 WBC         Culture Data:   No results found for: BLOODCX, URINECX, WOUNDCX, MRSACX, RESPCX, STOOLCX  Imaging Results (All)     Procedure Component Value Units Date/Time    XR Chest 1 View [[id number]] Collected:  05/31/20 1359     Updated:  05/31/20 1404    Narrative:       EXAMINATION: Chest 1 view 05/31/2020     HISTORY: CHF/COPD protocol     FINDINGS: Today's exam is compared to previous study of 07/06/2019.  Lungs are mildly hyperinflated. There is no evidence of lobar pneumonia  or effusion. There is atheromatous calcification of the aortic arch.       Impression:       . No acute disease.  This report was finalized on 05/31/2020 14:01 by Dr. Beau Bills MD.        Hospital Course  Patient is a 74 y.o. female presented to [facility] emergency room 5/31/2020 with 2 to 3-day history of worsening shortness of breath.  Patient has known history of COPD with home oxygen at 4 L.  Patient reported feeling as though she was full of congestion and could not cough it out.  She denied fever or chills.  Patient reported episodes of severe coughing which was leaving her short of breath.  She was noted to be tachycardic and oxygen saturation 89% with respiratory rate 24 on admission.  She received nebulizer treatment, Solu-Medrol and azithromycin in ER.  ABGs showed pH 7.3, PCO2 60, PO2 164 on 4 L nasal cannula.  Chest x-ray showed no obvious consolidation, hyperinflation of lungs, calcified aortic arch.  Echocardiogram July 2019 showed a normal ejection fraction.    She was admitted to the medical floor and upon arrival to the floor the patient saturation was noted to be in the low 80s on 4 L.  Rapid response team was activated and she was placed on BiPAP with improvement of symptoms.  DuoNeb treatments, home nebulizers Pulmicort and Boniva continued.  Mucinex and flutter valve added.  Delsym and Tessalon ordered as needed for cough.  IV Solu-Medrol continued.  Patient  reported she is usually discharged on prednisone and feels well initially and then starts feeling poorly a week or 2 after steroids completed.  She is followed by a pulmonologist in [place].  She does not live locally but is here visiting her sister for the next month.  Respiratory PCR panel negative, COVID-19 negative.  Patient uses astral trilogy machine at night and felt as though she was not receiving enough pressure.  [employer] adjusted settings during hospital stay and patient reported she was able to rest better at night and felt as though she was breathing better with adjusted settings.  She continued to use trilogy at night and oxygen 4 L throughout the day.  She completed antibiotic treatment with azithromycin during hospital stay.  Solu-Medrol tapered and transition to longer dose of tapering dose of prednisone at discharge.  She will be discharged on prednisone 40 mg daily for 3 days then 20 mg daily for 3 days then 10 mg daily for 3 days.  We will continue Mucinex at discharge.  She continues to have decreased breath sounds throughout.  She has minimal anterior wheezing on the day of admission.  Patient reports that she normally wheezes upon waking in the morning and continues until she has her breathing treatments and takes her medications for the day.    Physical therapy consulted and patient was able to ambulate 65 feet with 3 standing rest.  She uses a quad cane.  Patient will be staying with her sister, so for the next month as patient lives in [place].  We attempted to arrange home health but difficulty as patient has no local primary care provider.  We were able to arrange a follow-up with Dr. Burns on 6/15/2020 follow-up hospital visit and see patient while she is staying locally.  Otherwise, she will follow-up with her primary care provider Dr. Jacobsen after returning home in [place].    Diabetes mellitus type 2 on metformin.  Glucoses elevated due to high-dose steroids.    On 6/6/20  "patient reports she feels much better than upon admission and she is 85 to 90% at baseline and she wants to go home today.  She has all of her breathing treatments and medications here with her locally while she is visiting her sister.  I discussed prolonged tapering dose of prednisone at discharge.  Again, have arranged appointment with Dr. Burns on 6/15/2020 for follow-up hospital stay as patient has no local primary care provider and lives in [place].  She will follow-up with her primary care provider as well as her pulmonologist when she returns to [place].  She denies chest pain, palpitations, or increased shortness of breath.  She feels as though her shortness of breath is at baseline.  No sputum production.  Minimal faint anterior expiratory wheezes.    Physical Exam on Discharge:  /73 (BP Location: Right arm, Patient Position: Sitting)   Pulse 103   Temp 97.9 °F (36.6 °C) (Oral)   Resp 24   Ht 152.4 cm (60\")   Wt 57.7 kg (127 lb 3.2 oz)   SpO2 93%   BMI 24.84 kg/m²   Physical Exam   Constitutional: She is oriented to person, place, and time.   Sitting up on side of bed.  Oxygen at 4 L.  No family in room.  No sputum production.   HENT:   Head: Normocephalic and atraumatic.   Eyes: Pupils are equal, round, and reactive to light. EOM are normal.   Neck: Normal range of motion. Neck supple.   Cardiovascular: Normal rate, regular rhythm, normal heart sounds and intact distal pulses. Exam reveals no gallop and no friction rub.   No murmur heard.  Normal sinus rhythm  telemetry   Pulmonary/Chest: She has wheezes (Faint expiratory wheeze anterior.  No wheezing posterior.).   Oxygen at 4 L (baseline) decreased breath sounds posterior.  Faint expiratory wheeze anterior.  No sputum production.   Abdominal: Soft. Bowel sounds are normal. She exhibits no distension. There is no tenderness.   Genitourinary:   Genitourinary Comments: Voiding.   Musculoskeletal: Normal range of motion. She exhibits no " edema, tenderness or deformity.   Neurological: She is alert and oriented to person, place, and time.   Skin: No rash noted. No erythema. No pallor.   Psychiatric: She has a normal mood and affect. Her behavior is normal. Judgment and thought content normal.     Condition on Discharge: Stable    Discharge Disposition: Home with sister    Discharge Diet:   Diet Instructions     Diet: Consistent Carbohydrate      Discharge Diet:  Consistent Carbohydrate        Activity at Discharge:   Activity Instructions     Activity as Tolerated          Discharge Care Plan / Instructions:   1.  For worsening shortness of breath and sputum production seek medical attention.  2.  Tapering dose of prednisone at discharge.  3.  Have arranged appointment with Dr. Burns on 6/15/2022 to follow-up while in [place].  4.  Follow-up with primary care provider Dr. Jacobsen after returning home in [place].    Discharge Medications:     Discharge Medications      New Medications      Instructions Start Date   benzonatate 200 MG capsule  Commonly known as:  TESSALON   200 mg, Oral, 3 Times Daily PRN      predniSONE 10 MG tablet  Commonly known as:  DELTASONE   Take 4 tablets for 3 days begin 6/7/2020, then 2 tablets for 3 days, then 1 tablet for 3 days         Continue These Medications      Instructions Start Date   arformoterol 15 MCG/2ML nebulizer solution  Commonly known as:  BROVANA   15 mcg, Nebulization, 2 Times Daily - RT      atorvastatin 80 MG tablet  Commonly known as:  LIPITOR   80 mg, Oral, Daily      budesonide 0.5 MG/2ML nebulizer solution  Commonly known as:  PULMICORT   0.5 mg, Nebulization, 2 Times Daily      clopidogrel 75 MG tablet  Commonly known as:  PLAVIX   75 mg, Oral, Daily      dilTIAZem  MG 24 hr capsule  Commonly known as:  CARDIZEM CD   120 mg, Oral, Daily      docusate sodium 100 MG capsule  Commonly known as:  COLACE   100 mg, Oral, 2 Times Daily PRN      escitalopram 10 MG tablet  Commonly known as:   LEXAPRO   10 mg, Oral, Daily      ferrous sulfate 325 (65 FE) MG tablet   325 mg, Oral, 2 Times Daily      fluticasone 50 MCG/ACT nasal spray  Commonly known as:  FLONASE   2 sprays, Nasal, Daily      guaiFENesin 600 MG 12 hr tablet  Commonly known as:  MUCINEX   1,200 mg, Oral, Every 12 Hours Scheduled      hydrOXYzine 25 MG tablet  Commonly known as:  ATARAX   25 mg, Oral, Every 8 Hours PRN      ipratropium-albuterol 0.5-2.5 mg/3 ml nebulizer  Commonly known as:  DUO-NEB   3 mL, Nebulization, 4 Times Daily - RT      metFORMIN 500 MG tablet  Commonly known as:  GLUCOPHAGE   500 mg, Oral, 2 Times Daily With Meals      montelukast 10 MG tablet  Commonly known as:  SINGULAIR   10 mg, Oral, Nightly      pantoprazole 40 MG EC tablet  Commonly known as:  PROTONIX   40 mg, Oral, Daily      tiotropium 18 MCG per inhalation capsule  Commonly known as:  SPIRIVA   1 capsule, Inhalation, Daily - RT           Follow-up Appointments:   Follow-up Information     Arsh Burns DO Follow up.    Specialty:  Family Medicine  Why:  Appointment Tonia 15, 2020 @ 2:15 PM  Contact information:  [address]  [address]  [facility] [address]  [phone]                 Future Appointments:  Future Appointments   Date Time Provider Department Center   6/15/2020  2:15 PM Arsh Burns DO [facility] None     Test Results Pending at Discharge: None    The above documentation resulted from a face-to-face encounter by me Laura OCAMPO, [facility].    TERRENCE Goode  06/06/20  10:09    Time: This discharge process required 35 minutes for completion.    Plan discussed with Dr. Roman and patient.    Time spent in face-to-face evaluation, chart review, planning and education 35 minutes.    I personally evaluated and examined the patient in conjunction with TERRENCE Vidal and agree with the assessment, treatment plan, and disposition of the patient as recorded by her. My history, exam, and further recommendations are:     On  exam patient's lung sounds are still diminished with very minimal end expiratory wheeze.  Overall her lung exam has improved slightly day by day for  last couple days.    Patient is on her home oxygen dosage.  She says she feels about 90% at baseline.  She is ambulating well and wants to go home today.  Okay with discharge on slow steroid taper.  She has completed antibiotics.  Will get outpatient follow-up for her.  Explained to patient she has any worsening of symptoms return to the hospital.      Arsh Roman DO  06/06/20  10:59

## 2020-06-06 NOTE — THERAPY DISCHARGE NOTE
Acute Care - Physical Therapy Progress Note/Discharge   [place]     Patient Name: Jessica Gregg  : 1945  MRN: [id number]  Today's Date: 2020  Onset of Illness/Injury or Date of Surgery: 20     Referring Physician: Dr. Oglesby    Admit Date: 2020    Visit Dx:    ICD-10-CM ICD-9-CM   1. COPD exacerbation (CMS/HCC) J44.1 491.21   2. Impaired functional mobility and activity tolerance Z74.09 V49.89   3. Decreased activities of daily living (ADL) Z78.9 V49.89     Patient Active Problem List   Diagnosis   • Chronic obstructive pulmonary disease with acute exacerbation (CMS/HCC)   • CHF (congestive heart failure) (CMS/[facility])   • Type 2 diabetes mellitus, without long-term current use of insulin (CMS/[facility])   • CAD (coronary artery disease)   • COPD exacerbation (CMS/HCC)   • Acute on chronic respiratory failure with hypoxia and hypercapnia (CMS/[facility])       Physical Therapy Education                 Title: PT OT SLP Therapies (Done)     Topic: Physical Therapy (Resolved)     Point: Mobility training (Resolved)     Description:   Instruct learner(s) on safety and technique for assisting patient out of bed, chair or wheelchair.  Instruct in the proper use of assistive devices, such as walker, crutches, cane or brace.              Patient Friendly Description:   It's important to get you on your feet again, but we need to do so in a way that is safe for you. Falling has serious consequences, and your personal safety is the most important thing of all.        When it's time to get out of bed, one of us or a family member will sit next to you on the bed to give you support.     If your doctor or nurse tells you to use a walker, crutches, a cane, or a brace, be sure you use it every time you get out of bed, even if you think you don't need it.    Learning Progress Summary           Patient Acceptance, CLIFTON SEO DU by BRITTON at 2020 0800    Comment:  Educated pt on progression of PT POC and benefits of  activity                   Point: Home exercise program (Resolved)     Description:   Instruct learner(s) on appropriate technique for monitoring, assisting and/or progressing patient with therapeutic exercises and activities.              Learner Progress:   Not documented in this visit.          Point: Body mechanics (Resolved)     Description:   Instruct learner(s) on proper positioning and spine alignment for patient and/or caregiver during mobility tasks and/or exercises.              Learner Progress:   Not documented in this visit.          Point: Precautions (Resolved)     Description:   Instruct learner(s) on prescribed precautions during mobility and gait tasks              Learner Progress:   Not documented in this visit.                      User Key     Initials Effective Dates Name Provider Type Discipline    BRITTON 08/02/16 -  Zach Yang, PT DPT Physical Therapist PT              Rehab Goal Summary     Row Name 06/06/20 1100             Transfer Goal 1 (PT)    [place] Level/Cues Needed (Transfer Goal 1, PT)  supervision required Goal: independent  -BERNIE      Progress/Outcome (Transfer Goal 1, PT)  goal not met  -BERNIE         Gait Training Goal 1 (PT)    [place] Level (Gait Training Goal 1, PT)  standby assist Goal: independent  -BERNIE      Distance (Gait Goal 1, PT)  85 Goal: 100  -BERNIE      Progress/Outcome (Gait Training Goal 1, PT)  goal not met  -BERNIE        User Key  (r) = Recorded By, (t) = Taken By, (c) = Cosigned By    Initials Name Provider Type Discipline    Tatiana Mack, PTA Physical Therapy Assistant PT        Therapy Treatment         PT Recommendation and Plan  Anticipated Discharge Disposition (PT): home with assist  Outcome Summary/Treatment Plan (PT)  Anticipated Discharge Disposition (PT): home with assist  Outcome Summary: Pt. stands and sits with stand by assist. Ambulated 65' with 3 standing rests, a quad cane and CGA. Breathing improved this a.m. Does tend to do better  in a.m. Will continue to benefit from strengthening activities.    Outcome Measures     Row Name 06/05/20 0913 06/04/20 0802          How much help from another is currently needed...    Putting on and taking off regular lower body clothing?  3  -CJ  3  -CJ     Bathing (including washing, rinsing, and drying)  3  -CJ  3  -CJ     Toileting (which includes using toilet bed pan or urinal)  3  -CJ  3  -CJ     Putting on and taking off regular upper body clothing  3  -CJ  3  -CJ     Taking care of personal grooming (such as brushing teeth)  3  -CJ  3  -CJ     Eating meals  3  -CJ  3  -CJ     AM-PAC 6 Clicks Score (OT)  18  -CJ  18  -CJ        Functional Assessment    Outcome Measure Options  AM-PAC 6 Clicks Daily Activity (OT)  -  AM-PAC 6 Clicks Daily Activity (OT)  -       User Key  (r) = Recorded By, (t) = Taken By, (c) = Cosigned By    Initials Name Provider Type     Gilberto Benson COTA/L Occupational Therapy Assistant           Time Calculation:         PT G-Codes  Outcome Measure Options: AM-PAC 6 Clicks Daily Activity (OT)  AM-PAC 6 Clicks Score (PT): 18  AM-PAC 6 Clicks Score (OT): 18    PT Discharge Summary  Anticipated Discharge Disposition (PT): home with assist  Reason for Discharge: Discharge from facility  Outcomes Achieved: Other(close to achieving goals.)  Discharge Destination: Home with assist    Tatiana Raya, PTA  6/6/2020

## 2020-06-06 NOTE — PLAN OF CARE
Problem: Patient Care Overview  Goal: Plan of Care Review  Outcome: Ongoing (interventions implemented as appropriate)  Flowsheets  Taken 6/5/2020 1552 by Carmina Leija, PTA  Progress: improving  Taken 6/5/2020 2001 by Cady Scott, RN  Plan of Care Reviewed With: patient  Taken 6/6/2020 0531 by Cady Scott, RN  Outcome Summary: pt had no c/o pain during shift; rested well through the night; S/-111; ambulated to bathroom with cane and stand by assist; SOB noted with exertion; will continue to monitor and maintain pt safety.

## 2020-06-06 NOTE — PROGRESS NOTES
Continued Stay Note  [facility]     Patient Name: Jessica Gregg  MRN: [id number]  Today's Date: 6/6/2020    Admit Date: 5/31/2020    Discharge Plan     Row Name 06/06/20 1017       Plan    Final Discharge Disposition Code  01 - home or self-care    Final Note  Pt will be discharged home today. SW is not able to set up HH due to pt not having PCP at this time. Pt does have a appointment with Dr. Grant ibarra. Dr. Hollis office will then be able to sign pt up for HH. TERRENCE Sanchez notified         Discharge Codes    No documentation.       Expected Discharge Date and Time     Expected Discharge Date Expected Discharge Time    Jun 6, 2020             Carmina Cedillo

## 2020-06-06 NOTE — THERAPY DISCHARGE NOTE
Acute Care - Occupational Therapy Discharge Summary  [facility]     Patient Name: Jessica Gregg  : 1945  MRN: [id number]    Today's Date: 2020  Onset of Illness/Injury or Date of Surgery: 20    Date of Referral to OT: 20  Referring Physician: Dr. Oglesby      Admit Date: 2020        OT Recommendation and Plan    Visit Dx:    ICD-10-CM ICD-9-CM   1. COPD exacerbation (CMS/[facility]) J44.1 491.21   2. Impaired functional mobility and activity tolerance Z74.09 V49.89   3. Decreased activities of daily living (ADL) Z78.9 V49.89               Rehab Goal Summary     Row Name 20 1610 20 1100          Transfer Goal 1 (PT)    Mobile Level/Cues Needed (Transfer Goal 1, PT)  --  supervision required Goal: independent  -BERNIE     Progress/Outcome (Transfer Goal 1, PT)  --  goal not met  -BERNIE        Gait Training Goal 1 (PT)    [place] Level (Gait Training Goal 1, PT)  --  standby assist Goal: independent  -BERNIE     Distance (Gait Goal 1, PT)  --  85 Goal: 100  -BERNIE     Progress/Outcome (Gait Training Goal 1, PT)  --  goal not met  -BERNIE        Transfer Goal 1 (OT)    Activity/Assistive Device (Transfer Goal 1, OT)  toilet;tub  -MT  --     Mobile Level/Cues Needed (Transfer Goal 1, OT)  supervision required  -MT  --     Time Frame (Transfer Goal 1, OT)  long term goal (LTG);by discharge  -MT  --     Progress/Outcome (Transfer Goal 1, OT)  goal not met  -MT  --        Bathing Goal 1 (OT)    Activity/Assistive Device (Bathing Goal 1, OT)  bathing skills, all;shower chair  -MT  --     Mobile Level/Cues Needed (Bathing Goal 1, OT)  supervision required  -MT  --     Time Frame (Bathing Goal 1, OT)  long term goal (LTG);by discharge  -MT  --     Progress/Outcomes (Bathing Goal 1, OT)  goal not met  -MT  --        Patient Education Goal (OT)    Activity (Patient Education Goal, OT)  demo use of 3 energy conservation strategies to reduce needed rest breaks to </=1 to increase  activity tolerance  -MT  --     [place]/Cues/Accuracy (Memory Goal 2, OT)  demonstrates adequately;independent;verbalizes understanding  -MT  --     Time Frame (Patient Education Goal, OT)  long term goal (LTG);by discharge  -MT  --     Progress/Outcome (Patient Education Goal, OT)  goal not met  -MT  --       User Key  (r) = Recorded By, (t) = Taken By, (c) = Cosigned By    Initials Name Provider Type Discipline    BERNIE Tatiana Raya, PTA Physical Therapy Assistant PT    Cony Way COTA/L Occupational Therapy Assistant OT          Outcome Measures     Row Name 06/05/20 0913 06/04/20 0802          How much help from another is currently needed...    Putting on and taking off regular lower body clothing?  3  -CJ  3  -CJ     Bathing (including washing, rinsing, and drying)  3  -  3  -CJ     Toileting (which includes using toilet bed pan or urinal)  3  -  3  -CJ     Putting on and taking off regular upper body clothing  3  -  3  -CJ     Taking care of personal grooming (such as brushing teeth)  3  -  3  -CJ     Eating meals  3  -CJ  3  -CJ     AM-PAC 6 Clicks Score (OT)  18  -CJ  18  -CJ        Functional Assessment    Outcome Measure Options  AM-PAC 6 Clicks Daily Activity (OT)  -CJ  AM-PAC 6 Clicks Daily Activity (OT)  -CJ       User Key  (r) = Recorded By, (t) = Taken By, (c) = Cosigned By    Initials Name Provider Type    CJ Gilberto Benson COTA/L Occupational Therapy Assistant          Therapy Suggested Charges     Code   Minutes Charges    68823 (CPT®) Hc Ot Neuromusc Re Education Ea 15 Min      17753 (CPT®) Hc Ot Ther Proc Ea 15 Min      49164 (CPT®) Hc Ot Therapeutic Act Ea 15 Min      90328 (CPT®) Hc Ot Manual Therapy Ea 15 Min      91707 (CPT®) Hc Ot Iontophoresis Ea 15 Min      56637 (CPT®) Hc Ot Elec Stim Ea-Per 15 Min      38998 (CPT®) Hc Ot Ultrasound Ea 15 Min      92433 (CPT®) Hc Ot Self Care/Mgmt/Train Ea 15 Min 39 3    Total  39 3              OT Discharge Summary  Reason  for Discharge: Discharge from facility  Outcomes Achieved: Refer to plan of care for updates on goals achieved  Discharge Destination: Home      MARILIN Farmer  6/6/2020

## 2020-06-07 ENCOUNTER — READMISSION MANAGEMENT (OUTPATIENT)
Dept: CALL CENTER | Facility: HOSPITAL | Age: 75
End: 2020-06-07

## 2020-06-07 NOTE — OUTREACH NOTE
Prep Survey      Responses   [facility] patient discharged from?  [facility]   Is LACE score < 7 ?  No   Eligibility  Readm Mgmt   Discharge diagnosis  COPD AE,  A/C resp. failure with hypoxia and hypercapnia, CAD, NIDDM II   COVID-19 Test Status  Negative   Does the patient have one of the following disease processes/diagnoses(primary or secondary)?  COPD/Pneumonia   Does the patient have Home health ordered?  No   Is there a DME ordered?  No   Comments regarding appointments  See AVS   Medication alerts for this patient  see AVS   Prep survey completed?  Yes          Roya Cameron RN

## 2020-06-07 NOTE — PAYOR COMM NOTE
"CO HOME 6-6-20  [id number]    Jessica Gregg (74 y.o. Female)     Date of Birth Social Security Number Address Home Phone MRN    1945  [address] [facility] [address] [phone] [id number]    Sabianism Marital Status          Shinto        Admission Date Admission Type Admitting Provider Attending Provider Department, Room/Bed    5/31/20 Emergency Arsh Roman DO  [facility] 4B, 446/1    Discharge Date Discharge Disposition Discharge Destination        6/6/2020 Home or Self Care              Attending Provider:  (none)   Allergies:  Latex    Isolation:  None   Infection:  None   Code Status:  Prior    Ht:  152.4 cm (60\")   Wt:  57.7 kg (127 lb 3.2 oz)    Admission Cmt:  None   Principal Problem:  Acute on chronic respiratory failure with hypoxia and hypercapnia (CMS/HCC) [J96.21,J96.22]                 Active Insurance as of 5/31/2020     Primary Coverage     Payor Plan Insurance Group Employer/Plan Group    HUMANA MEDICARE REPLACEMENT HUMANA MEDICARE REPLACEMENT [id number]     Payor Plan Address Payor Plan Phone Number Payor Plan Fax Number Effective Dates    [address] [address] [phone]  10/1/2018 - None Entered    [facility] [id number]       Subscriber Name Subscriber Birth Date Member ID       JESSICA GREGG 1945 [id number]                 Emergency Contacts      (Rel.) Home Phone Work Phone Mobile Phone    Mariaa Pitts (Sister) -- -- [phone]               Discharge Summary      Arsh Roman DO at 06/06/20 0935              [facility]  DISCHARGE SUMMARY     Date of Admission: 5/31/2020  Date of Discharge:  6/6/2020  Primary Care Physician: Chandan, Sushma, MD    Presenting Problem/History of Present Illness:  COPD exacerbation (CMS/HCC) [J44.1]     Discharge Diagnoses:  Active Hospital Problems    Diagnosis   • **Acute on chronic respiratory failure with hypoxia and hypercapnia (CMS/HCC)   "   • COPD exacerbation (CMS/[facility])   • CAD (coronary artery disease)   • Type 2 diabetes mellitus, without long-term current use of insulin (CMS/[facility])     Chief Complaint on Day of Discharge: Feels as though she is close to baseline and wants to go home today.  History of Present Illness on Day of Discharge: Sitting up on side of bed.  Oxygen at 4 L.  Breath sounds decreased.  Faint expiratory wheeze anterior.  No wheezing heard posterior.  Patient ambulated in the hallway 3-4 times yesterday and reported she feels as though she is close to baseline.  Overall no sputum production and she feels that C she continues to improve daily.  She is asking to go home today.  She plans to stay locally with her sister for a month before returning back home to [place].    Consults: None    Procedures Performed: None    Pertinent Test Results:   Laboratory Data:    Results from last 7 days   Lab Units 06/04/20  0700 05/31/20  1236   WBC 10*3/mm3 13.95* 11.22*   HEMOGLOBIN g/dL 12.2 12.4   HEMATOCRIT % 38.3 41.2   PLATELETS 10*3/mm3 295 307        Results from last 7 days   Lab Units 06/04/20  0700 05/31/20  1255 05/31/20  1236   SODIUM mmol/L 138  --  141   SODIUM, ARTERIAL mmol/L  --  141  --    POTASSIUM mmol/L 4.4  --  4.4   CHLORIDE mmol/L 93*  --  98   CO2 mmol/L 31.0*  --  27.0   BUN mg/dL 28*  --  16   CREATININE mg/dL 0.58  --  0.65   CALCIUM mg/dL 9.6  --  9.5   BILIRUBIN mg/dL  --   --  0.2   ALK PHOS U/L  --   --  95   ALT (SGPT) U/L  --   --  11   AST (SGOT) U/L  --   --  19   GLUCOSE mg/dL 289*  --  216*     Lab Results (all)     Procedure Component Value Units Date/Time    POC Glucose Once [[id number]]  (Abnormal) Collected:  06/06/20 0757    Specimen:  Blood Updated:  06/06/20 0809     Glucose 326 mg/dL      Comment: : [id number] Rosen SarahMeter ID: [id number]       POC Glucose Once [[id number]]  (Abnormal) Collected:  06/05/20 1643    Specimen:  Blood Updated:  06/05/20 1705     Glucose 204 mg/dL      Comment:  : [id number] Mendez ChristinaMeter ID: [id number]       POC Glucose Once [[id number]]  (Abnormal) Collected:  06/05/20 1141    Specimen:  Blood Updated:  06/05/20 1213     Glucose 272 mg/dL      Comment: : [id number] Mendez ChristinaMeter ID: [id number]       POC Glucose Once [[id number]]  (Abnormal) Collected:  06/05/20 0749    Specimen:  Blood Updated:  06/05/20 0807     Glucose 301 mg/dL      Comment: : [id number] Mendez ChristinaMeter ID: [id number]       POC Glucose Once [[id number]]  (Abnormal) Collected:  06/04/20 1617    Specimen:  Blood Updated:  06/04/20 1628     Glucose 330 mg/dL      Comment: : [id number] Optisort WhitneyMeter ID: [id number]       POC Glucose Once [[id number]]  (Abnormal) Collected:  06/04/20 1157    Specimen:  Blood Updated:  06/04/20 1209     Glucose 204 mg/dL      Comment: : [id number] MobilitrixMeter ID: [id number]       POC Glucose Once [[id number]]  (Abnormal) Collected:  06/04/20 0720    Specimen:  Blood Updated:  06/04/20 0743     Glucose 284 mg/dL      Comment: : [id number] MobilitrixMeter ID: [id number]       Basic Metabolic Panel [[id number]]  (Abnormal) Collected:  06/04/20 0700    Specimen:  Blood Updated:  06/04/20 0731     Glucose 289 mg/dL      BUN 28 mg/dL      Creatinine 0.58 mg/dL      Sodium 138 mmol/L      Potassium 4.4 mmol/L      Chloride 93 mmol/L      CO2 31.0 mmol/L      Calcium 9.6 mg/dL      eGFR Non African Amer 102 mL/min/1.73      BUN/Creatinine Ratio 48.3     Anion Gap 14.0 mmol/L     Narrative:       GFR Normal >60  Chronic Kidney Disease <60  Kidney Failure <15      CBC & Differential [[id number]] Collected:  06/04/20 0700    Specimen:  Blood Updated:  06/04/20 0715    Narrative:       The following orders were created for panel order CBC & Differential.  Procedure                               Abnormality         Status                     ---------                               -----------         ------                     CBC Auto  Differential[[id number]]        Abnormal            Final result                 Please view results for these tests on the individual orders.    CBC Auto Differential [[id number]]  (Abnormal) Collected:  06/04/20 0700    Specimen:  Blood Updated:  06/04/20 0715     WBC 13.95 10*3/mm3      RBC 4.38 10*6/mm3      Hemoglobin 12.2 g/dL      Hematocrit 38.3 %      MCV 87.4 fL      MCH 27.9 pg      MCHC 31.9 g/dL      RDW 13.8 %      RDW-SD 44.7 fl      MPV 10.0 fL      Platelets 295 10*3/mm3      Neutrophil % 91.5 %      Lymphocyte % 4.4 %      Monocyte % 3.4 %      Eosinophil % 0.0 %      Basophil % 0.1 %      Immature Grans % 0.6 %      Neutrophils, Absolute 12.75 10*3/mm3      Lymphocytes, Absolute 0.62 10*3/mm3      Monocytes, Absolute 0.48 10*3/mm3      Eosinophils, Absolute 0.00 10*3/mm3      Basophils, Absolute 0.01 10*3/mm3      Immature Grans, Absolute 0.09 10*3/mm3      nRBC 0.0 /100 WBC     POC Glucose Once [[id number]]  (Abnormal) Collected:  06/03/20 1646    Specimen:  Blood Updated:  06/03/20 1658     Glucose 305 mg/dL      Comment: : [id number] Rick PosadaaisaMeter ID: [id number]       POC Glucose Once [[id number]]  (Abnormal) Collected:  06/03/20 1135    Specimen:  Blood Updated:  06/03/20 1146     Glucose 323 mg/dL      Comment: : [id number] Belen CheyenneMeter ID: [id number]       POC Glucose Once [[id number]]  (Abnormal) Collected:  06/03/20 0745    Specimen:  Blood Updated:  06/03/20 0756     Glucose 289 mg/dL      Comment: : [id number] Belen CheyenneMeter ID: [id number]       POC Glucose Once [[id number]]  (Abnormal) Collected:  06/02/20 1601    Specimen:  Blood Updated:  06/02/20 1612     Glucose 273 mg/dL      Comment: : [id number] WhitneyMeter ID: [id number]       POC Glucose Once [[id number]]  (Abnormal) Collected:  06/02/20 1135    Specimen:  Blood Updated:  06/02/20 1145     Glucose 263 mg/dL      Comment: : [address] [place], [place] [address]Meter ID: [id number]       POC  Glucose Once [[id number]]  (Abnormal) Collected:  06/02/20 0740    Specimen:  Blood Updated:  06/02/20 0756     Glucose 276 mg/dL      Comment: : [id number] WhitneyMeter ID: [id number]       POC Glucose Once [[id number]]  (Abnormal) Collected:  06/01/20 1642    Specimen:  Blood Updated:  06/01/20 1704     Glucose 252 mg/dL      Comment: : [id number] Coy (DeBoe) AutumnMeter ID: [id number]       POC Glucose Once [[id number]]  (Abnormal) Collected:  06/01/20 1156    Specimen:  Blood Updated:  06/01/20 1208     Glucose 289 mg/dL      Comment: : [id number] MD On-Line WhitneyMeter ID: [id number]       POC Glucose Once [[id number]]  (Abnormal) Collected:  06/01/20 0758    Specimen:  Blood Updated:  06/01/20 0809     Glucose 211 mg/dL      Comment: : [id number] MD On-Line WhitneyMeter ID: [id number]       Blood Gas, Arterial [[id number]]  (Abnormal) Collected:  05/31/20 1810    Specimen:  Arterial Blood Updated:  05/31/20 1815     Site Right Radial     Aneesh's Test Positive     pH, Arterial 7.309 pH units      Comment: 84 Value below reference range        pCO2, Arterial 59.2 mm Hg      Comment: 83 Value above reference range        pO2, Arterial 96.3 mm Hg      HCO3, Arterial 29.7 mmol/L      Comment: 83 Value above reference range        Base Excess, Arterial 2.2 mmol/L      Comment: 83 Value above reference range        O2 Saturation, Arterial 97.0 %      Temperature 37.0 C      Barometric Pressure for Blood Gas 755 mmHg      Modality BiPap     FIO2 35 %      Ventilator Mode NA     Set [facility] Resp Rate 18.0     IPAP 16     Comment: Meter: [id number]     :  [id number]        EPAP 8     Collected by [id number]    POC Glucose Once [[id number]]  (Abnormal) Collected:  05/31/20 1642    Specimen:  Blood Updated:  05/31/20 1653     Glucose 232 mg/dL      Comment: : [id number] Chris SheenaMeter ID: [id number]       COVID-19,CEPHEID,COR/CHARLINE/PAD IN-HOUSE(OR EMERGENT/ADD-ON),NP SWAB IN TRANSPORT MEDIA 3-4 HR  TAT - Swab, Nasopharynx [[id number]]  (Normal) Collected:  05/31/20 1349    Specimen:  Swab from Nasopharynx Updated:  05/31/20 1447     COVID19 Not Detected    Respiratory Panel, PCR - Swab, Nasopharynx [[id number]]  (Normal) Collected:  05/31/20 1349    Specimen:  Swab from Nasopharynx Updated:  05/31/20 1446     ADENOVIRUS, PCR Not Detected     Coronavirus 229E Not Detected     Coronavirus HKU1 Not Detected     Coronavirus NL63 Not Detected     Coronavirus OC43 Not Detected     Human Metapneumovirus Not Detected     Human Rhinovirus/Enterovirus Not Detected     Influenza B PCR Not Detected     Parainfluenza Virus 1 Not Detected     Parainfluenza Virus 2 Not Detected     Parainfluenza Virus 3 Not Detected     Parainfluenza Virus 4 Not Detected     Bordetella pertussis pcr Not Detected     Influenza A H1 2009 PCR Not Detected     Chlamydophila pneumoniae PCR Not Detected     Mycoplasma pneumo by PCR Not Detected     Influenza A PCR Not Detected     Influenza A H3 Not Detected     Influenza A H1 Not Detected     RSV, PCR Not Detected     Bordetella parapertussis PCR Not Detected    Narrative:       The coronavirus on the RVP is NOT COVID-19 and is NOT indicative of infection with COVID-19.     [place] Draw [[id number]] Collected:  05/31/20 1025    Specimen:  Blood Updated:  05/31/20 1345    Narrative:       The following orders were created for panel order [place] Draw.  Procedure                               Abnormality         Status                     ---------                               -----------         ------                     Light Blue Top[[id number]]                                   Final result               Green Top (Gel)[[id number]]                                  Final result               Lavender Top[[id number]]                                     Final result               Red Top[[id number]]                                          Final result               [place] Blood Culture  Cornelius...[[id number]]                      Final result               Gray Top - Ice[[id number]]                                   Final result                 Please view results for these tests on the individual orders.    Light Blue Top [[id number]] Collected:  05/31/20 1236    Specimen:  Blood Updated:  05/31/20 1345     Extra Tube hold for add-on     Comment: Auto resulted       Green Top (Gel) [[id number]] Collected:  05/31/20 1236    Specimen:  Blood Updated:  05/31/20 1345     Extra Tube Hold for add-ons.     Comment: Auto resulted.       Lavender Top [[id number]] Collected:  05/31/20 1236    Specimen:  Blood Updated:  05/31/20 1345     Extra Tube hold for add-on     Comment: Auto resulted       Red Top [[id number]] Collected:  05/31/20 1236    Specimen:  Blood Updated:  05/31/20 1345     Extra Tube Hold for add-ons.     Comment: Auto resulted.       Gray Top - Ice [[id number]] Collected:  05/31/20 1236    Specimen:  Blood Updated:  05/31/20 1345     Extra Tube Hold for add-ons.     Comment: Auto resulted.       [place] Blood Culture Bottle Set [[id number]] Collected:  05/31/20 1025    Specimen:  Blood from Arm, Left Updated:  05/31/20 1315     Extra Tube Hold for add-ons.     Comment: Auto resulted.       Comprehensive Metabolic Panel [[id number]]  (Abnormal) Collected:  05/31/20 1236    Specimen:  Blood Updated:  05/31/20 1313     Glucose 216 mg/dL      BUN 16 mg/dL      Creatinine 0.65 mg/dL      Sodium 141 mmol/L      Potassium 4.4 mmol/L      Chloride 98 mmol/L      CO2 27.0 mmol/L      Calcium 9.5 mg/dL      Total Protein 7.3 g/dL      Albumin 4.40 g/dL      ALT (SGPT) 11 U/L      AST (SGOT) 19 U/L      Alkaline Phosphatase 95 U/L      Total Bilirubin 0.2 mg/dL      eGFR Non African Amer 89 mL/min/1.73      Globulin 2.9 gm/dL      A/G Ratio 1.5 g/dL      BUN/Creatinine Ratio 24.6     Anion Gap 16.0 mmol/L     Narrative:       GFR Normal >60  Chronic Kidney Disease <60  Kidney Failure <15      Troponin [[id number]]   (Normal) Collected:  05/31/20 1236    Specimen:  Blood Updated:  05/31/20 1311     Troponin T <0.010 ng/mL     Narrative:       Troponin T Reference Range:  <= 0.03 ng/mL-   Negative for AMI  >0.03 ng/mL-     Abnormal for myocardial necrosis.  Clinicians would have to utilize clinical acumen, EKG, Troponin and serial changes to determine if it is an Acute Myocardial Infarction or myocardial injury due to an underlying chronic condition.       Results may be falsely decreased if patient taking Biotin.      BNP [[id number]]  (Normal) Collected:  05/31/20 1236    Specimen:  Blood Updated:  05/31/20 1310     proBNP 231.1 pg/mL     Narrative:       Among patients with dyspnea, NT-proBNP is highly sensitive for the detection of acute congestive heart failure. In addition NT-proBNP of <300 pg/ml effectively rules out acute congestive heart failure with 99% negative predictive value.    Results may be falsely decreased if patient taking Biotin.      Blood Gas, Arterial With Co-Ox [[id number]]  (Abnormal) Collected:  05/31/20 1255    Specimen:  Arterial Blood Updated:  05/31/20 1302     Site Right Brachial     Aneesh's Test N/A     pH, Arterial 7.309 pH units      Comment: 84 Value below reference range        pCO2, Arterial 59.8 mm Hg      Comment: 83 Value above reference range        pO2, Arterial 162.0 mm Hg      Comment: 83 Value above reference range        HCO3, Arterial 30.0 mmol/L      Comment: 83 Value above reference range        Base Excess, Arterial 2.3 mmol/L      Comment: 83 Value above reference range        O2 Saturation, Arterial 99.5 %      Comment: 83 Value above reference range        Hemoglobin, Blood Gas 13.2 g/dL      Hematocrit, Blood Gas 40.6 %      Oxyhemoglobin 97.9 %      Methemoglobin 0.70 %      Carboxyhemoglobin 0.9 %      A-a Gradiant --     Comment: UNABLE TO CALCULATE        Temperature 37.0 C      Sodium, Arterial 141 mmol/L      Potassium, Arterial 4.6 mmol/L      Barometric Pressure for  Blood Gas 757 mmHg      Modality Nasal Cannula     Flow Rate 4.0 lpm      Ventilator Mode NA     Note --     Collected by [id number]     Comment: Meter: [id number]     :  [id number]        pH, Temp Corrected --     pCO2, Temperature Corrected --     pO2, Temperature Corrected --    CBC & Differential [[id number]] Collected:  05/31/20 1236    Specimen:  Blood Updated:  05/31/20 1258    Narrative:       The following orders were created for panel order CBC & Differential.  Procedure                               Abnormality         Status                     ---------                               -----------         ------                     CBC Auto Differential[[id number]]        Abnormal            Final result                 Please view results for these tests on the individual orders.    CBC Auto Differential [[id number]]  (Abnormal) Collected:  05/31/20 1236    Specimen:  Blood Updated:  05/31/20 1258     WBC 11.22 10*3/mm3      RBC 4.53 10*6/mm3      Hemoglobin 12.4 g/dL      Hematocrit 41.2 %      MCV 90.9 fL      MCH 27.4 pg      MCHC 30.1 g/dL      RDW 13.8 %      RDW-SD 46.1 fl      MPV 9.9 fL      Platelets 307 10*3/mm3      Neutrophil % 72.7 %      Lymphocyte % 14.9 %      Monocyte % 7.1 %      Eosinophil % 4.3 %      Basophil % 0.7 %      Immature Grans % 0.3 %      Neutrophils, Absolute 8.16 10*3/mm3      Lymphocytes, Absolute 1.67 10*3/mm3      Monocytes, Absolute 0.80 10*3/mm3      Eosinophils, Absolute 0.48 10*3/mm3      Basophils, Absolute 0.08 10*3/mm3      Immature Grans, Absolute 0.03 10*3/mm3      nRBC 0.0 /100 WBC         Culture Data:   No results found for: BLOODCX, URINECX, WOUNDCX, MRSACX, RESPCX, STOOLCX  Imaging Results (All)     Procedure Component Value Units Date/Time    XR Chest 1 View [[id number]] Collected:  05/31/20 1359     Updated:  05/31/20 140    Narrative:       EXAMINATION: Chest 1 view 05/31/2020     HISTORY: CHF/COPD protocol     FINDINGS: Today's exam is compared  to previous study of 07/06/2019.  Lungs are mildly hyperinflated. There is no evidence of lobar pneumonia  or effusion. There is atheromatous calcification of the aortic arch.       Impression:       . No acute disease.  This report was finalized on 05/31/2020 14:01 by Dr. Beau Bills MD.        Hospital Course  Patient is a 74 y.o. female presented to [facility] emergency room 5/31/2020 with 2 to 3-day history of worsening shortness of breath.  Patient has known history of COPD with home oxygen at 4 L.  Patient reported feeling as though she was full of congestion and could not cough it out.  She denied fever or chills.  Patient reported episodes of severe coughing which was leaving her short of breath.  She was noted to be tachycardic and oxygen saturation 89% with respiratory rate 24 on admission.  She received nebulizer treatment, Solu-Medrol and azithromycin in ER.  ABGs showed pH 7.3, PCO2 60, PO2 164 on 4 L nasal cannula.  Chest x-ray showed no obvious consolidation, hyperinflation of lungs, calcified aortic arch.  Echocardiogram July 2019 showed a normal ejection fraction.    She was admitted to the medical floor and upon arrival to the floor the patient saturation was noted to be in the low 80s on 4 L.  Rapid response team was activated and she was placed on BiPAP with improvement of symptoms.  DuoNeb treatments, home nebulizers Pulmicort and Boniva continued.  Mucinex and flutter valve added.  Delsym and Tessalon ordered as needed for cough.  IV Solu-Medrol continued.  Patient reported she is usually discharged on prednisone and feels well initially and then starts feeling poorly a week or 2 after steroids completed.  She is followed by a pulmonologist in [place].  She does not live locally but is here visiting her sister for the next month.  Respiratory PCR panel negative, COVID-19 negative.  Patient uses astral Needium machine at night and felt as though she was not receiving enough  pressure.  [employer] adjusted settings during hospital stay and patient reported she was able to rest better at night and felt as though she was breathing better with adjusted settings.  She continued to use trilogy at night and oxygen 4 L throughout the day.  She completed antibiotic treatment with azithromycin during hospital stay.  Solu-Medrol tapered and transition to longer dose of tapering dose of prednisone at discharge.  She will be discharged on prednisone 40 mg daily for 3 days then 20 mg daily for 3 days then 10 mg daily for 3 days.  We will continue Mucinex at discharge.  She continues to have decreased breath sounds throughout.  She has minimal anterior wheezing on the day of admission.  Patient reports that she normally wheezes upon waking in the morning and continues until she has her breathing treatments and takes her medications for the day.    Physical therapy consulted and patient was able to ambulate 65 feet with 3 standing rest.  She uses a quad cane.  Patient will be staying with her sister, so for the next month as patient lives in [place].  We attempted to arrange home health but difficulty as patient has no local primary care provider.  We were able to arrange a follow-up with Dr. Burns on 6/15/2020 follow-up hospital visit and see patient while she is staying locally.  Otherwise, she will follow-up with her primary care provider Dr. Jacobsen after returning home in [place].    Diabetes mellitus type 2 on metformin.  Glucoses elevated due to high-dose steroids.    On 6/6/20 patient reports she feels much better than upon admission and she is 85 to 90% at baseline and she wants to go home today.  She has all of her breathing treatments and medications here with her locally while she is visiting her sister.  I discussed prolonged tapering dose of prednisone at discharge.  Again, have arranged appointment with Dr. Burns on 6/15/2020 for follow-up hospital stay as patient has no local  "primary care provider and lives in [place].  She will follow-up with her primary care provider as well as her pulmonologist when she returns to [place].  She denies chest pain, palpitations, or increased shortness of breath.  She feels as though her shortness of breath is at baseline.  No sputum production.  Minimal faint anterior expiratory wheezes.    Physical Exam on Discharge:  /73 (BP Location: Right arm, Patient Position: Sitting)   Pulse 103   Temp 97.9 °F (36.6 °C) (Oral)   Resp 24   Ht 152.4 cm (60\")   Wt 57.7 kg (127 lb 3.2 oz)   SpO2 93%   BMI 24.84 kg/m²    Physical Exam   Constitutional: She is oriented to person, place, and time.   Sitting up on side of bed.  Oxygen at 4 L.  No family in room.  No sputum production.   HENT:   Head: Normocephalic and atraumatic.   Eyes: Pupils are equal, round, and reactive to light. EOM are normal.   Neck: Normal range of motion. Neck supple.   Cardiovascular: Normal rate, regular rhythm, normal heart sounds and intact distal pulses. Exam reveals no gallop and no friction rub.   No murmur heard.  Normal sinus rhythm  telemetry   Pulmonary/Chest: She has wheezes (Faint expiratory wheeze anterior.  No wheezing posterior.).   Oxygen at 4 L (baseline) decreased breath sounds posterior.  Faint expiratory wheeze anterior.  No sputum production.   Abdominal: Soft. Bowel sounds are normal. She exhibits no distension. There is no tenderness.   Genitourinary:   Genitourinary Comments: Voiding.   Musculoskeletal: Normal range of motion. She exhibits no edema, tenderness or deformity.   Neurological: She is alert and oriented to person, place, and time.   Skin: No rash noted. No erythema. No pallor.   Psychiatric: She has a normal mood and affect. Her behavior is normal. Judgment and thought content normal.     Condition on Discharge: Stable    Discharge Disposition: Home with sister    Discharge Diet:   Diet Instructions     Diet: Consistent Carbohydrate      " Discharge Diet:  Consistent Carbohydrate        Activity at Discharge:   Activity Instructions     Activity as Tolerated          Discharge Care Plan / Instructions:   1.  For worsening shortness of breath and sputum production seek medical attention.  2.  Tapering dose of prednisone at discharge.  3.  Have arranged appointment with Dr. Burns on 6/15/2022 to follow-up while in [place].  4.  Follow-up with primary care provider Dr. Jacobsen after returning home in [place].    Discharge Medications:     Discharge Medications      New Medications      Instructions Start Date   benzonatate 200 MG capsule  Commonly known as:  TESSALON   200 mg, Oral, 3 Times Daily PRN      predniSONE 10 MG tablet  Commonly known as:  DELTASONE   Take 4 tablets for 3 days begin 6/7/2020, then 2 tablets for 3 days, then 1 tablet for 3 days         Continue These Medications      Instructions Start Date   arformoterol 15 MCG/2ML nebulizer solution  Commonly known as:  BROVANA   15 mcg, Nebulization, 2 Times Daily - RT      atorvastatin 80 MG tablet  Commonly known as:  LIPITOR   80 mg, Oral, Daily      budesonide 0.5 MG/2ML nebulizer solution  Commonly known as:  PULMICORT   0.5 mg, Nebulization, 2 Times Daily      clopidogrel 75 MG tablet  Commonly known as:  PLAVIX   75 mg, Oral, Daily      dilTIAZem  MG 24 hr capsule  Commonly known as:  CARDIZEM CD   120 mg, Oral, Daily      docusate sodium 100 MG capsule  Commonly known as:  COLACE   100 mg, Oral, 2 Times Daily PRN      escitalopram 10 MG tablet  Commonly known as:  LEXAPRO   10 mg, Oral, Daily      ferrous sulfate 325 (65 FE) MG tablet   325 mg, Oral, 2 Times Daily      fluticasone 50 MCG/ACT nasal spray  Commonly known as:  FLONASE   2 sprays, Nasal, Daily      guaiFENesin 600 MG 12 hr tablet  Commonly known as:  MUCINEX   1,200 mg, Oral, Every 12 Hours Scheduled      hydrOXYzine 25 MG tablet  Commonly known as:  ATARAX   25 mg, Oral, Every 8 Hours PRN         ipratropium-albuterol 0.5-2.5 mg/3 ml nebulizer  Commonly known as:  DUO-NEB   3 mL, Nebulization, 4 Times Daily - RT      metFORMIN 500 MG tablet  Commonly known as:  GLUCOPHAGE   500 mg, Oral, 2 Times Daily With Meals      montelukast 10 MG tablet  Commonly known as:  SINGULAIR   10 mg, Oral, Nightly      pantoprazole 40 MG EC tablet  Commonly known as:  PROTONIX   40 mg, Oral, Daily      tiotropium 18 MCG per inhalation capsule  Commonly known as:  SPIRIVA   1 capsule, Inhalation, Daily - RT           Follow-up Appointments:   Follow-up Information     Arsh Burns DO Follow up.    Specialty:  Family Medicine  Why:  Appointment Tonia 15, 2020 @ 2:15 PM  Contact information:  [address]  [address]  [place] [address]  [phone]                 Future Appointments:  Future Appointments   Date Time Provider Department Center   6/15/2020  2:15 PM Arsh Burns DO [facility] None     Test Results Pending at Discharge: None    The above documentation resulted from a face-to-face encounter by me Laura OCAMPO, [facility].    TERRENCE Goode  06/06/20  10:09    Time: This discharge process required 35 minutes for completion.    Plan discussed with Dr. Roman and patient.    Time spent in face-to-face evaluation, chart review, planning and education 35 minutes.    I personally evaluated and examined the patient in conjunction with TERRENCE Vidal and agree with the assessment, treatment plan, and disposition of the patient as recorded by her. My history, exam, and further recommendations are:     On exam patient's lung sounds are still diminished with very minimal end expiratory wheeze.  Overall her lung exam has improved slightly day by day for  last couple days.    Patient is on her home oxygen dosage.  She says she feels about 90% at baseline.  She is ambulating well and wants to go home today.  Okay with discharge on slow steroid taper.  She has completed antibiotics.  Will get outpatient  follow-up for her.  Explained to patient she has any worsening of symptoms return to the hospital.      Arsh Roman DO  06/06/20  10:59        Electronically signed by Arsh Roman DO at 06/06/20 1103

## 2020-06-10 ENCOUNTER — READMISSION MANAGEMENT (OUTPATIENT)
Dept: CALL CENTER | Facility: HOSPITAL | Age: 75
End: 2020-06-10

## 2020-06-10 NOTE — OUTREACH NOTE
COPD/PN Week 1 Survey      Responses   [facility] patient discharged from?  [place]   COVID-19 Test Status  Negative   Does the patient have one of the following disease processes/diagnoses(primary or secondary)?  COPD/Pneumonia   Is there a successful TCM telephone encounter documented?  No   Was the primary reason for admission:  COPD exacerbation   Week 1 attempt successful?  No   Unsuccessful attempts  Attempt 1          Roya Rodrigez RN

## 2020-06-11 ENCOUNTER — READMISSION MANAGEMENT (OUTPATIENT)
Dept: CALL CENTER | Facility: HOSPITAL | Age: 75
End: 2020-06-11

## 2020-06-11 NOTE — OUTREACH NOTE
COPD/PN Week 1 Survey      Responses   [facility] patient discharged from?  [place]   COVID-19 Test Status  Negative   Does the patient have one of the following disease processes/diagnoses(primary or secondary)?  COPD/Pneumonia   Is there a successful TCM telephone encounter documented?  No   Was the primary reason for admission:  COPD exacerbation   Week 1 attempt successful?  No   Unsuccessful attempts  Attempt 2          Jose Raul Bronson RN

## 2020-06-12 ENCOUNTER — READMISSION MANAGEMENT (OUTPATIENT)
Dept: CALL CENTER | Facility: HOSPITAL | Age: 75
End: 2020-06-12

## 2020-06-12 NOTE — OUTREACH NOTE
COPD/PN Week 1 Survey      Responses   [facility] patient discharged from?  [place]   COVID-19 Test Status  Negative   Does the patient have one of the following disease processes/diagnoses(primary or secondary)?  COPD/Pneumonia   Is there a successful TCM telephone encounter documented?  No   Was the primary reason for admission:  COPD exacerbation   Week 1 attempt successful?  No   Unsuccessful attempts  Attempt 3          Kiara Melendrez RN

## 2020-06-15 ENCOUNTER — OFFICE VISIT (OUTPATIENT)
Dept: INTERNAL MEDICINE | Facility: CLINIC | Age: 75
End: 2020-06-15

## 2020-06-15 VITALS
HEART RATE: 92 BPM | WEIGHT: 117 LBS | BODY MASS INDEX: 22.97 KG/M2 | RESPIRATION RATE: 18 BRPM | OXYGEN SATURATION: 98 % | TEMPERATURE: 98.4 F | HEIGHT: 60 IN | DIASTOLIC BLOOD PRESSURE: 68 MMHG | SYSTOLIC BLOOD PRESSURE: 124 MMHG

## 2020-06-15 DIAGNOSIS — J44.9 COPD, SEVERE (HCC): Primary | ICD-10-CM

## 2020-06-15 PROCEDURE — 99204 OFFICE O/P NEW MOD 45 MIN: CPT | Performed by: FAMILY MEDICINE

## 2020-06-15 RX ORDER — GUAIFENESIN 600 MG/1
1200 TABLET, EXTENDED RELEASE ORAL 2 TIMES DAILY
Qty: 60 TABLET | Refills: 2 | Status: SHIPPED | OUTPATIENT
Start: 2020-06-15

## 2020-06-15 RX ORDER — BLOOD PRESSURE TEST KIT
KIT MISCELLANEOUS
Status: ON HOLD | COMMUNITY
Start: 2019-10-08 | End: 2020-07-11

## 2020-06-15 RX ORDER — BENZONATATE 200 MG/1
200 CAPSULE ORAL 3 TIMES DAILY PRN
Qty: 30 CAPSULE | Refills: 5 | Status: SHIPPED | OUTPATIENT
Start: 2020-06-15

## 2020-06-15 NOTE — PROGRESS NOTES
CC:   Chief Complaint   Patient presents with   • Establish Care     follow up from hospital stay       History:  Jessica Gregg is a 74 y.o. female who presents today for evaluation of the above problems.      Here to establish care after hospital stay from 5/31/20-6/6/20 admitted for 2 to 3-day history of worsening shortness of breath in the setting of known COPD on home oxygen at 4 L.  During treatment for acute COPD exacerbation she did have a rapid response requiring BiPAP therapy.    She currently feels well and is at her relative baseline.  It was recommended that she have home health at discharge.  She plans on staying here locally with her sister for at least 2 months and would like referral placed today.    ROS:  Review of Systems   Constitutional: Fatigue: getting better.   Respiratory: Shortness of breath: baseline.    Musculoskeletal: Positive for gait problem.   All other systems reviewed and are negative.      Allergies   Allergen Reactions   • Tramadol Other (See Comments)     hallucinations   • Latex Rash     Past Medical History:   Diagnosis Date   • Asthma    • CHF (congestive heart failure) (CMS/HCC)    • COPD (chronic obstructive pulmonary disease) (CMS/HCC)    • Diabetes mellitus (CMS/HCC)    • Emphysema lung (CMS/HCC)    • Hyperlipidemia    • Hypertension    • Mediastinal lymphadenopathy    • Stroke (CMS/HCC)      Past Surgical History:   Procedure Laterality Date   • CAROTID STENT     • CHOLECYSTECTOMY     • COLONOSCOPY     • ESOPHAGUS SURGERY     • SIGMOIDOSCOPY       Family History   Problem Relation Age of Onset   • Hypertension Mother    • Hypertension Father    • Heart disease Father    • Stroke Father       reports that she has quit smoking. She has never used smokeless tobacco. She reports that she drinks alcohol. She reports that she does not use drugs.      Current Outpatient Medications:   •  Alcohol Swabs pads, Use as directed to check blood sugars., Disp: , Rfl:   •  arformoterol  (BROVANA) 15 MCG/2ML nebulizer solution, Take 15 mcg by nebulization 2 (Two) Times a Day., Disp: , Rfl:   •  benzonatate (TESSALON) 200 MG capsule, Take 1 capsule by mouth 3 (Three) Times a Day As Needed for Cough., Disp: 30 capsule, Rfl: 5  •  budesonide (PULMICORT) 0.5 MG/2ML nebulizer solution, Take 0.5 mg by nebulization 2 (Two) Times a Day., Disp: , Rfl:   •  clopidogrel (PLAVIX) 75 MG tablet, Take 75 mg by mouth Daily., Disp: , Rfl:   •  dilTIAZem CD (CARDIZEM CD) 120 MG 24 hr capsule, Take 120 mg by mouth Daily., Disp: , Rfl:   •  docusate sodium (COLACE) 100 MG capsule, Take 100 mg by mouth 2 (Two) Times a Day As Needed for Constipation., Disp: , Rfl:   •  escitalopram (LEXAPRO) 10 MG tablet, Take 10 mg by mouth Daily., Disp: , Rfl:   •  ferrous sulfate 325 (65 FE) MG tablet, Take 325 mg by mouth 2 (Two) Times a Day., Disp: , Rfl:   •  fluticasone (FLONASE) 50 MCG/ACT nasal spray, 2 sprays into the nostril(s) as directed by provider Daily., Disp: , Rfl:   •  guaiFENesin (MUCINEX) 600 MG 12 hr tablet, Take 2 tablets by mouth 2 (Two) Times a Day., Disp: 60 tablet, Rfl: 2  •  hydrOXYzine (ATARAX) 25 MG tablet, Take 1 tablet by mouth Every 8 (Eight) Hours As Needed for Itching, Allergies or Anxiety., Disp: 24 tablet, Rfl: 0  •  ipratropium-albuterol (DUO-NEB) 0.5-2.5 mg/3 ml nebulizer, Take 3 mL by nebulization 4 (Four) Times a Day., Disp: 360 mL, Rfl: 2  •  metFORMIN (GLUCOPHAGE) 500 MG tablet, Take 500 mg by mouth 2 (Two) Times a Day With Meals., Disp: , Rfl:   •  montelukast (SINGULAIR) 10 MG tablet, Take 1 tablet by mouth Every Night., Disp: 30 tablet, Rfl: 0  •  pantoprazole (PROTONIX) 40 MG EC tablet, Take 40 mg by mouth Daily., Disp: , Rfl:   •  atorvastatin (LIPITOR) 80 MG tablet, Take 80 mg by mouth Daily., Disp: , Rfl:   •  tiotropium (SPIRIVA) 18 MCG per inhalation capsule, Place 1 capsule into inhaler and inhale Daily., Disp: , Rfl:     OBJECTIVE:  /68 (BP Location: Left arm, Patient Position:  "Sitting, Cuff Size: Adult)   Pulse 92   Temp 98.4 °F (36.9 °C) (Tympanic)   Resp 18   Ht 152.4 cm (60\")   Wt 53.1 kg (117 lb)   SpO2 98%   BMI 22.85 kg/m²    Physical Exam   Constitutional: She is oriented to person, place, and time. No distress.   HENT:   Head: Normocephalic and atraumatic.   Nose: Nose normal.   Eyes: Conjunctivae are normal. Right eye exhibits no discharge. Left eye exhibits no discharge. No scleral icterus.   Neck: No tracheal deviation present.   Cardiovascular: Normal rate, regular rhythm and normal heart sounds. Exam reveals no gallop and no friction rub.   No murmur heard.  Pulmonary/Chest: Effort normal. No respiratory distress.   Diminished breath sounds throughout   Neurological: She is alert and oriented to person, place, and time.   Skin: Skin is warm and dry. She is not diaphoretic. No pallor.   Psychiatric: She has a normal mood and affect. Her behavior is normal. Judgment and thought content normal.   Nursing note and vitals reviewed.      Assessment/Plan     Diagnosis Plan   1. COPD, severe (CMS/HCC)  guaiFENesin (MUCINEX) 600 MG 12 hr tablet    benzonatate (TESSALON) 200 MG capsule    Ambulatory Referral to Home Health   Continue current regimen with refills of Mucinex and Tessalon Perles for chronic cough  Referral to home health    Patient's Body mass index is 22.85 kg/m². BMI is within normal parameters. No follow-up required..      An After Visit Summary was printed and given to the patient at discharge.  Return in about 3 months (around 9/15/2020).         Arsh Burns D.O.  [facility]  Osteopathic Neuromusculoskeletal Medicine  6/17/2020  "

## 2020-06-19 ENCOUNTER — READMISSION MANAGEMENT (OUTPATIENT)
Dept: CALL CENTER | Facility: HOSPITAL | Age: 75
End: 2020-06-19

## 2020-06-22 ENCOUNTER — READMISSION MANAGEMENT (OUTPATIENT)
Dept: CALL CENTER | Facility: HOSPITAL | Age: 75
End: 2020-06-22

## 2020-06-22 NOTE — OUTREACH NOTE
COPD/PN Week 2 Survey      Responses   [facility] patient discharged from?  [place]   COVID-19 Test Status  Negative   Does the patient have one of the following disease processes/diagnoses(primary or secondary)?  COPD/Pneumonia   Was the primary reason for admission:  COPD exacerbation   Week 2 attempt successful?  Yes   Call end time  1727   Is patient permission given to speak with other caregiver?  Yes   List who call center can speak with  RAYSA HUNTER   Person spoke with today (if not patient) and relationship  RAYSA HUNTER -[facility] reviewed with patient/caregiver?  Yes   Is the patient having any side effects they believe may be caused by any medication additions or changes?  No   Does the patient have all medications ordered at discharge?  Yes   Is the patient taking all medications as directed (includes completed medication regime)?  Yes   Does the patient have a primary care provider?   Yes   Has the patient kept scheduled appointments due by today?  Yes   What is the Home health agency?   [facility]   Has home health visited the patient within 72 hours of discharge?  Yes   Did the patient receive a copy of their discharge instructions?  Yes   Nursing interventions  Reviewed instructions with patient   What is the patient's perception of their health status since discharge?  Improving   Nursing Interventions  Nurse provided patient education   Is the patient able to teach back COPD zones?  Yes   Nursing interventions  Education provided on various zones   Patient reports what zone on this call?  Green Zone   Green Zone  Reports doing well, Breathing without shortness of breath, Usual activity and exercise level, Usual amount of phlegm/mucus without difficulty coughing up, Sleeping well, Appetite is good   Green Zone interventions:  Take daily medications, Use oxygen as prescribed, Avoid indoor/outdoor triggers, Continue regular exercise/diet plan   Week 2  call completed?  Yes          Radha Mcconnell LPN

## 2020-06-29 ENCOUNTER — TELEPHONE (OUTPATIENT)
Dept: INTERNAL MEDICINE | Facility: CLINIC | Age: 75
End: 2020-06-29

## 2020-06-29 DIAGNOSIS — J44.1 COPD EXACERBATION (HCC): Primary | ICD-10-CM

## 2020-06-29 RX ORDER — PREDNISONE 20 MG/1
40 TABLET ORAL DAILY
Qty: 10 TABLET | Refills: 0 | Status: ON HOLD | OUTPATIENT
Start: 2020-06-29 | End: 2020-07-11

## 2020-06-29 RX ORDER — AZITHROMYCIN 500 MG/1
500 TABLET, FILM COATED ORAL DAILY
Qty: 3 TABLET | Refills: 0 | Status: SHIPPED | OUTPATIENT
Start: 2020-06-29 | End: 2020-07-02

## 2020-06-29 NOTE — TELEPHONE ENCOUNTER
Abida Berman with [facility] home care, stated that patient o2 was 83 while on oxygen while at rest.  Stated that this did come up to 90 for a little while, the dropped back down.  Also, patient's -119.  Patient told her that she wants Dr Burns to send in steroid pack so that she doesn't have to go back to hospital.  Please advise.      Also, [facility] home care needing an order for bedside commode.

## 2020-06-30 ENCOUNTER — READMISSION MANAGEMENT (OUTPATIENT)
Dept: CALL CENTER | Facility: HOSPITAL | Age: 75
End: 2020-06-30

## 2020-06-30 NOTE — OUTREACH NOTE
COPD/PN Week 3 Survey      Responses   [facility] patient discharged from?  [place]   COVID-19 Test Status  Negative   Does the patient have one of the following disease processes/diagnoses(primary or secondary)?  COPD/Pneumonia   Was the primary reason for admission:  COPD exacerbation   Week 3 attempt successful?  No   Unsuccessful attempts  Attempt 1          Kiara Melendrez RN

## 2020-07-01 ENCOUNTER — READMISSION MANAGEMENT (OUTPATIENT)
Dept: CALL CENTER | Facility: HOSPITAL | Age: 75
End: 2020-07-01

## 2020-07-01 ENCOUNTER — TELEPHONE (OUTPATIENT)
Dept: INTERNAL MEDICINE | Facility: CLINIC | Age: 75
End: 2020-07-01

## 2020-07-01 DIAGNOSIS — J44.9 COPD, SEVERE (HCC): Primary | ICD-10-CM

## 2020-07-01 NOTE — OUTREACH NOTE
COPD/PN Week 3 Survey      Responses   [facility] patient discharged from?  [place]   COVID-19 Test Status  Negative   Does the patient have one of the following disease processes/diagnoses(primary or secondary)?  COPD/Pneumonia   Was the primary reason for admission:  COPD exacerbation   Week 3 attempt successful?  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  Yes   Medication comments  MD added steroids   Has the patient kept scheduled appointments due by today?  Yes   What [facility] agency[facility]   Has home health visited the patient within 72 hours of discharge?  Yes   DME comments  O2 in place has new O2 concentrator   Pulse Ox monitoring  Intermittent   Pulse Ox device source  Patient   O2 Sat comments  O2 sats 92-96%   Psychosocial issues?  No   Comments  Pt is now doing better now. Pt's O2 concentrator was not working properly, they are unsure if the low sats at beginning of week were result of that or exacerbation again but she is doing better on steroids and with new machine.    What is the patient's perception of their health status since discharge?  Improving   Patient reports what zone on this call?  Yellow Zone   Yellow Zone  Increased shortness of air, Unable to complete daily activities, Poor sleep and symptoms work patient up   Yellow interventions  Use oxygen as ordered   Week 3 call completed?  Yes          Sdaa Yoon RN

## 2020-07-06 ENCOUNTER — TELEPHONE (OUTPATIENT)
Dept: INTERNAL MEDICINE | Facility: CLINIC | Age: 75
End: 2020-07-06

## 2020-07-06 NOTE — TELEPHONE ENCOUNTER
Cony with [facility] home care, wanted to document that patient was having some loose BM over the weekend, wanted to let Dr Burns be aware.

## 2020-07-10 ENCOUNTER — APPOINTMENT (OUTPATIENT)
Dept: GENERAL RADIOLOGY | Facility: HOSPITAL | Age: 75
End: 2020-07-10

## 2020-07-10 ENCOUNTER — APPOINTMENT (OUTPATIENT)
Dept: CT IMAGING | Facility: HOSPITAL | Age: 75
End: 2020-07-10

## 2020-07-10 ENCOUNTER — READMISSION MANAGEMENT (OUTPATIENT)
Dept: CALL CENTER | Facility: HOSPITAL | Age: 75
End: 2020-07-10

## 2020-07-10 ENCOUNTER — HOSPITAL ENCOUNTER (INPATIENT)
Facility: HOSPITAL | Age: 75
LOS: 5 days | Discharge: HOME HEALTH | End: 2020-07-15
Attending: FAMILY MEDICINE | Admitting: FAMILY MEDICINE

## 2020-07-10 ENCOUNTER — TELEPHONE (OUTPATIENT)
Dept: INTERNAL MEDICINE | Facility: CLINIC | Age: 75
End: 2020-07-10

## 2020-07-10 DIAGNOSIS — J44.1 CHRONIC OBSTRUCTIVE PULMONARY DISEASE WITH ACUTE EXACERBATION (HCC): ICD-10-CM

## 2020-07-10 DIAGNOSIS — Z74.09 IMPAIRED MOBILITY: ICD-10-CM

## 2020-07-10 DIAGNOSIS — J18.9 PNEUMONIA DUE TO INFECTIOUS ORGANISM, UNSPECIFIED LATERALITY, UNSPECIFIED PART OF LUNG: Primary | ICD-10-CM

## 2020-07-10 DIAGNOSIS — K92.2 GASTROINTESTINAL HEMORRHAGE, UNSPECIFIED GASTROINTESTINAL HEMORRHAGE TYPE: ICD-10-CM

## 2020-07-10 DIAGNOSIS — Z78.9 DECREASED ACTIVITIES OF DAILY LIVING (ADL): ICD-10-CM

## 2020-07-10 LAB
ALBUMIN SERPL-MCNC: 3.4 G/DL (ref 3.5–5.2)
ALBUMIN/GLOB SERPL: 1.2 G/DL
ALP SERPL-CCNC: 76 U/L (ref 39–117)
ALT SERPL W P-5'-P-CCNC: 9 U/L (ref 1–33)
ANION GAP SERPL CALCULATED.3IONS-SCNC: 13 MMOL/L (ref 5–15)
ARTERIAL PATENCY WRIST A: ABNORMAL
AST SERPL-CCNC: 14 U/L (ref 1–32)
ATMOSPHERIC PRESS: 749 MMHG
BASE EXCESS BLDA CALC-SCNC: 6.9 MMOL/L (ref 0–2)
BASOPHILS # BLD AUTO: 0.07 10*3/MM3 (ref 0–0.2)
BASOPHILS NFR BLD AUTO: 0.5 % (ref 0–1.5)
BDY SITE: ABNORMAL
BILIRUB SERPL-MCNC: 0.2 MG/DL (ref 0–1.2)
BODY TEMPERATURE: 37 C
BUN SERPL-MCNC: 16 MG/DL (ref 8–23)
BUN/CREAT SERPL: 32.7 (ref 7–25)
CALCIUM SPEC-SCNC: 9.5 MG/DL (ref 8.6–10.5)
CHLORIDE SERPL-SCNC: 96 MMOL/L (ref 98–107)
CO2 SERPL-SCNC: 28 MMOL/L (ref 22–29)
CREAT SERPL-MCNC: 0.49 MG/DL (ref 0.57–1)
D DIMER PPP FEU-MCNC: 0.81 MG/L (FEU) (ref 0–0.5)
D-LACTATE SERPL-SCNC: 2 MMOL/L (ref 0.5–2)
DEPRECATED RDW RBC AUTO: 47.8 FL (ref 37–54)
DEVELOPER EXPIRATION DATE: ABNORMAL
DEVELOPER LOT NUMBER: 169
EOSINOPHIL # BLD AUTO: 0.09 10*3/MM3 (ref 0–0.4)
EOSINOPHIL NFR BLD AUTO: 0.6 % (ref 0.3–6.2)
ERYTHROCYTE [DISTWIDTH] IN BLOOD BY AUTOMATED COUNT: 15.1 % (ref 12.3–15.4)
EXPIRATION DATE: ABNORMAL
FECAL OCCULT BLOOD SCREEN, POC: POSITIVE
GAS FLOW AIRWAY: 4 LPM
GFR SERPL CREATININE-BSD FRML MDRD: 123 ML/MIN/1.73
GLOBULIN UR ELPH-MCNC: 2.9 GM/DL
GLUCOSE SERPL-MCNC: 289 MG/DL (ref 65–99)
HCO3 BLDA-SCNC: 32.2 MMOL/L (ref 20–26)
HCT VFR BLD AUTO: 33.9 % (ref 34–46.6)
HGB BLD-MCNC: 10.6 G/DL (ref 12–15.9)
HOLD SPECIMEN: NORMAL
HOLD SPECIMEN: NORMAL
IMM GRANULOCYTES # BLD AUTO: 0.15 10*3/MM3 (ref 0–0.05)
IMM GRANULOCYTES NFR BLD AUTO: 1.1 % (ref 0–0.5)
LYMPHOCYTES # BLD AUTO: 1.21 10*3/MM3 (ref 0.7–3.1)
LYMPHOCYTES NFR BLD AUTO: 8.7 % (ref 19.6–45.3)
Lab: 169
Lab: ABNORMAL
Lab: ABNORMAL
MCH RBC QN AUTO: 27 PG (ref 26.6–33)
MCHC RBC AUTO-ENTMCNC: 31.3 G/DL (ref 31.5–35.7)
MCV RBC AUTO: 86.5 FL (ref 79–97)
MODALITY: ABNORMAL
MONOCYTES # BLD AUTO: 1.39 10*3/MM3 (ref 0.1–0.9)
MONOCYTES NFR BLD AUTO: 10 % (ref 5–12)
NEGATIVE CONTROL: NEGATIVE
NEUTROPHILS NFR BLD AUTO: 10.94 10*3/MM3 (ref 1.7–7)
NEUTROPHILS NFR BLD AUTO: 79.1 % (ref 42.7–76)
NOTIFIED BY: ABNORMAL
NOTIFIED WHO: ABNORMAL
NRBC BLD AUTO-RTO: 0 /100 WBC (ref 0–0.2)
NT-PROBNP SERPL-MCNC: 217 PG/ML (ref 0–900)
PCO2 BLDA: 48.3 MM HG (ref 35–45)
PCO2 TEMP ADJ BLD: 48.3 MM HG (ref 35–45)
PH BLDA: 7.43 PH UNITS (ref 7.35–7.45)
PH, TEMP CORRECTED: 7.43 PH UNITS (ref 7.35–7.45)
PLATELET # BLD AUTO: 292 10*3/MM3 (ref 140–450)
PMV BLD AUTO: 9.7 FL (ref 6–12)
PO2 BLDA: 46.7 MM HG (ref 83–108)
PO2 TEMP ADJ BLD: 46.7 MM HG (ref 83–108)
POSITIVE CONTROL: POSITIVE
POTASSIUM SERPL-SCNC: 4.3 MMOL/L (ref 3.5–5.2)
PROT SERPL-MCNC: 6.3 G/DL (ref 6–8.5)
RBC # BLD AUTO: 3.92 10*6/MM3 (ref 3.77–5.28)
SAO2 % BLDCOA: 81.2 % (ref 94–99)
SARS-COV-2 RDRP RESP QL NAA+PROBE: NOT DETECTED
SODIUM SERPL-SCNC: 137 MMOL/L (ref 136–145)
TROPONIN T SERPL-MCNC: <0.01 NG/ML (ref 0–0.03)
VENTILATOR MODE: ABNORMAL
WBC # BLD AUTO: 13.85 10*3/MM3 (ref 3.4–10.8)
WHOLE BLOOD HOLD SPECIMEN: NORMAL
WHOLE BLOOD HOLD SPECIMEN: NORMAL

## 2020-07-10 PROCEDURE — 71045 X-RAY EXAM CHEST 1 VIEW: CPT

## 2020-07-10 PROCEDURE — 82270 OCCULT BLOOD FECES: CPT | Performed by: FAMILY MEDICINE

## 2020-07-10 PROCEDURE — 87150 DNA/RNA AMPLIFIED PROBE: CPT | Performed by: FAMILY MEDICINE

## 2020-07-10 PROCEDURE — 25010000002 CEFTRIAXONE PER 250 MG: Performed by: FAMILY MEDICINE

## 2020-07-10 PROCEDURE — 0 IOPAMIDOL PER 1 ML: Performed by: FAMILY MEDICINE

## 2020-07-10 PROCEDURE — 85379 FIBRIN DEGRADATION QUANT: CPT | Performed by: FAMILY MEDICINE

## 2020-07-10 PROCEDURE — 82728 ASSAY OF FERRITIN: CPT | Performed by: FAMILY MEDICINE

## 2020-07-10 PROCEDURE — 87147 CULTURE TYPE IMMUNOLOGIC: CPT | Performed by: FAMILY MEDICINE

## 2020-07-10 PROCEDURE — 93010 ELECTROCARDIOGRAM REPORT: CPT | Performed by: INTERNAL MEDICINE

## 2020-07-10 PROCEDURE — 94799 UNLISTED PULMONARY SVC/PX: CPT

## 2020-07-10 PROCEDURE — 83605 ASSAY OF LACTIC ACID: CPT | Performed by: FAMILY MEDICINE

## 2020-07-10 PROCEDURE — 99285 EMERGENCY DEPT VISIT HI MDM: CPT

## 2020-07-10 PROCEDURE — 71275 CT ANGIOGRAPHY CHEST: CPT

## 2020-07-10 PROCEDURE — 84484 ASSAY OF TROPONIN QUANT: CPT | Performed by: FAMILY MEDICINE

## 2020-07-10 PROCEDURE — 82803 BLOOD GASES ANY COMBINATION: CPT

## 2020-07-10 PROCEDURE — 85025 COMPLETE CBC W/AUTO DIFF WBC: CPT | Performed by: FAMILY MEDICINE

## 2020-07-10 PROCEDURE — 93005 ELECTROCARDIOGRAM TRACING: CPT | Performed by: FAMILY MEDICINE

## 2020-07-10 PROCEDURE — 83540 ASSAY OF IRON: CPT | Performed by: FAMILY MEDICINE

## 2020-07-10 PROCEDURE — 87635 SARS-COV-2 COVID-19 AMP PRB: CPT | Performed by: FAMILY MEDICINE

## 2020-07-10 PROCEDURE — 85045 AUTOMATED RETICULOCYTE COUNT: CPT | Performed by: FAMILY MEDICINE

## 2020-07-10 PROCEDURE — 84466 ASSAY OF TRANSFERRIN: CPT | Performed by: FAMILY MEDICINE

## 2020-07-10 PROCEDURE — 83880 ASSAY OF NATRIURETIC PEPTIDE: CPT | Performed by: FAMILY MEDICINE

## 2020-07-10 PROCEDURE — 87040 BLOOD CULTURE FOR BACTERIA: CPT | Performed by: FAMILY MEDICINE

## 2020-07-10 PROCEDURE — 25010000002 AZITHROMYCIN PER 500 MG: Performed by: FAMILY MEDICINE

## 2020-07-10 PROCEDURE — 80053 COMPREHEN METABOLIC PANEL: CPT | Performed by: FAMILY MEDICINE

## 2020-07-10 PROCEDURE — 36600 WITHDRAWAL OF ARTERIAL BLOOD: CPT

## 2020-07-10 RX ORDER — PANTOPRAZOLE SODIUM 40 MG/10ML
80 INJECTION, POWDER, LYOPHILIZED, FOR SOLUTION INTRAVENOUS ONCE
Status: COMPLETED | OUTPATIENT
Start: 2020-07-10 | End: 2020-07-10

## 2020-07-10 RX ORDER — SODIUM CHLORIDE 0.9 % (FLUSH) 0.9 %
10 SYRINGE (ML) INJECTION AS NEEDED
Status: DISCONTINUED | OUTPATIENT
Start: 2020-07-10 | End: 2020-07-15 | Stop reason: HOSPADM

## 2020-07-10 RX ORDER — SODIUM CHLORIDE 0.9 % (FLUSH) 0.9 %
10 SYRINGE (ML) INJECTION AS NEEDED
Status: DISCONTINUED | OUTPATIENT
Start: 2020-07-10 | End: 2020-07-13

## 2020-07-10 RX ADMIN — AZITHROMYCIN 500 MG: 500 INJECTION, POWDER, LYOPHILIZED, FOR SOLUTION INTRAVENOUS at 23:29

## 2020-07-10 RX ADMIN — CEFTRIAXONE SODIUM 1 G: 1 INJECTION, POWDER, FOR SOLUTION INTRAMUSCULAR; INTRAVENOUS at 22:19

## 2020-07-10 RX ADMIN — IOPAMIDOL 100 ML: 755 INJECTION, SOLUTION INTRAVENOUS at 21:14

## 2020-07-10 RX ADMIN — PANTOPRAZOLE SODIUM 80 MG: 40 INJECTION, POWDER, FOR SOLUTION INTRAVENOUS at 22:18

## 2020-07-10 NOTE — OUTREACH NOTE
COPD/PN Week 4 Survey      Responses   [facility] patient discharged from?  [place]   COVID-19 Test Status  Negative   Does the patient have one of the following disease processes/diagnoses(primary or secondary)?  COPD/Pneumonia   Was the primary reason for admission:  COPD exacerbation   Week 4 attempt successful?  Yes   Call start time  0916   Rescheduled  Revoked [Mariaa, , updated patients phone number. RN was not able to reach Mariaa. 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

## 2020-07-10 NOTE — TELEPHONE ENCOUNTER
Patient's sister called, stated that patient's O2 stat has been fluctuating, now at 88 and HR dropped to 43.  Home Health nurse, Ashleigh is currently present completing home visit.  Per Ashleigh, patient is now on her CPAP machine and vitals have come back up to normal.  HR 98, O2 98, bp 108/50.  Discussed in detail with Dr Burns.  Per Dr Burns, if patient's vitals drop when placed back on oxygen she needs to present to ED for further evaluation.  Both home health nurse Ashleigh, and patient's sister/care taker, Mariaa, voiced understanding.

## 2020-07-11 PROBLEM — D62 ACUTE BLOOD LOSS ANEMIA: Status: ACTIVE | Noted: 2020-07-11

## 2020-07-11 PROBLEM — D50.9 IRON DEFICIENCY ANEMIA: Status: ACTIVE | Noted: 2020-07-11

## 2020-07-11 LAB
ANION GAP SERPL CALCULATED.3IONS-SCNC: 10 MMOL/L (ref 5–15)
BASOPHILS # BLD AUTO: 0.09 10*3/MM3 (ref 0–0.2)
BASOPHILS NFR BLD AUTO: 0.8 % (ref 0–1.5)
BUN SERPL-MCNC: 13 MG/DL (ref 8–23)
BUN/CREAT SERPL: 19.1 (ref 7–25)
CALCIUM SPEC-SCNC: 9.2 MG/DL (ref 8.6–10.5)
CHLORIDE SERPL-SCNC: 98 MMOL/L (ref 98–107)
CO2 SERPL-SCNC: 32 MMOL/L (ref 22–29)
CREAT SERPL-MCNC: 0.68 MG/DL (ref 0.57–1)
DEPRECATED RDW RBC AUTO: 48.1 FL (ref 37–54)
EOSINOPHIL # BLD AUTO: 0.13 10*3/MM3 (ref 0–0.4)
EOSINOPHIL NFR BLD AUTO: 1.1 % (ref 0.3–6.2)
ERYTHROCYTE [DISTWIDTH] IN BLOOD BY AUTOMATED COUNT: 15 % (ref 12.3–15.4)
FERRITIN SERPL-MCNC: 187.4 NG/ML (ref 13–150)
GFR SERPL CREATININE-BSD FRML MDRD: 85 ML/MIN/1.73
GLUCOSE BLDC GLUCOMTR-MCNC: 104 MG/DL (ref 70–130)
GLUCOSE BLDC GLUCOMTR-MCNC: 198 MG/DL (ref 70–130)
GLUCOSE BLDC GLUCOMTR-MCNC: 202 MG/DL (ref 70–130)
GLUCOSE BLDC GLUCOMTR-MCNC: 342 MG/DL (ref 70–130)
GLUCOSE SERPL-MCNC: 238 MG/DL (ref 65–99)
HCT VFR BLD AUTO: 32.3 % (ref 34–46.6)
HGB BLD-MCNC: 10 G/DL (ref 12–15.9)
IMM GRANULOCYTES # BLD AUTO: 0.17 10*3/MM3 (ref 0–0.05)
IMM GRANULOCYTES NFR BLD AUTO: 1.4 % (ref 0–0.5)
IRON 24H UR-MRATE: 35 MCG/DL (ref 37–145)
IRON SATN MFR SERPL: 12 % (ref 20–50)
L PNEUMO1 AG UR QL IA: NEGATIVE
LYMPHOCYTES # BLD AUTO: 1.28 10*3/MM3 (ref 0.7–3.1)
LYMPHOCYTES NFR BLD AUTO: 10.9 % (ref 19.6–45.3)
MCH RBC QN AUTO: 27 PG (ref 26.6–33)
MCHC RBC AUTO-ENTMCNC: 31 G/DL (ref 31.5–35.7)
MCV RBC AUTO: 87.3 FL (ref 79–97)
MONOCYTES # BLD AUTO: 1.46 10*3/MM3 (ref 0.1–0.9)
MONOCYTES NFR BLD AUTO: 12.4 % (ref 5–12)
MRSA DNA SPEC QL NAA+PROBE: NORMAL
NEUTROPHILS NFR BLD AUTO: 73.4 % (ref 42.7–76)
NEUTROPHILS NFR BLD AUTO: 8.6 10*3/MM3 (ref 1.7–7)
NRBC BLD AUTO-RTO: 0 /100 WBC (ref 0–0.2)
PLATELET # BLD AUTO: 269 10*3/MM3 (ref 140–450)
PMV BLD AUTO: 10.1 FL (ref 6–12)
POTASSIUM SERPL-SCNC: 4 MMOL/L (ref 3.5–5.2)
RBC # BLD AUTO: 3.7 10*6/MM3 (ref 3.77–5.28)
RETICS # AUTO: 0.06 10*6/MM3 (ref 0.02–0.13)
RETICS/RBC NFR AUTO: 1.46 % (ref 0.7–1.9)
S PNEUM AG SPEC QL LA: NEGATIVE
SODIUM SERPL-SCNC: 140 MMOL/L (ref 136–145)
TIBC SERPL-MCNC: 283 MCG/DL (ref 298–536)
TRANSFERRIN SERPL-MCNC: 190 MG/DL (ref 200–360)
WBC # BLD AUTO: 11.73 10*3/MM3 (ref 3.4–10.8)

## 2020-07-11 PROCEDURE — 87899 AGENT NOS ASSAY W/OPTIC: CPT | Performed by: NURSE PRACTITIONER

## 2020-07-11 PROCEDURE — 82962 GLUCOSE BLOOD TEST: CPT

## 2020-07-11 PROCEDURE — 63710000001 INSULIN LISPRO (HUMAN) PER 5 UNITS: Performed by: NURSE PRACTITIONER

## 2020-07-11 PROCEDURE — 94799 UNLISTED PULMONARY SVC/PX: CPT

## 2020-07-11 PROCEDURE — 25010000002 CEFEPIME PER 500 MG: Performed by: NURSE PRACTITIONER

## 2020-07-11 PROCEDURE — 94660 CPAP INITIATION&MGMT: CPT

## 2020-07-11 PROCEDURE — 80048 BASIC METABOLIC PNL TOTAL CA: CPT | Performed by: FAMILY MEDICINE

## 2020-07-11 PROCEDURE — 85025 COMPLETE CBC W/AUTO DIFF WBC: CPT | Performed by: FAMILY MEDICINE

## 2020-07-11 PROCEDURE — 25010000002 VANCOMYCIN PER 500 MG: Performed by: NURSE PRACTITIONER

## 2020-07-11 PROCEDURE — 63710000001 INSULIN LISPRO (HUMAN) PER 5 UNITS: Performed by: FAMILY MEDICINE

## 2020-07-11 PROCEDURE — 87641 MR-STAPH DNA AMP PROBE: CPT | Performed by: NURSE PRACTITIONER

## 2020-07-11 PROCEDURE — 94640 AIRWAY INHALATION TREATMENT: CPT

## 2020-07-11 PROCEDURE — 63710000001 PREDNISONE PER 1 MG: Performed by: NURSE PRACTITIONER

## 2020-07-11 RX ORDER — DOCUSATE SODIUM 100 MG/1
100 CAPSULE, LIQUID FILLED ORAL 2 TIMES DAILY PRN
Status: DISCONTINUED | OUTPATIENT
Start: 2020-07-11 | End: 2020-07-15 | Stop reason: HOSPADM

## 2020-07-11 RX ORDER — PREDNISONE 20 MG/1
20 TABLET ORAL
Status: DISCONTINUED | OUTPATIENT
Start: 2020-07-11 | End: 2020-07-13

## 2020-07-11 RX ORDER — MONTELUKAST SODIUM 10 MG/1
10 TABLET ORAL NIGHTLY
Status: DISCONTINUED | OUTPATIENT
Start: 2020-07-11 | End: 2020-07-15 | Stop reason: HOSPADM

## 2020-07-11 RX ORDER — PANTOPRAZOLE SODIUM 40 MG/1
40 TABLET, DELAYED RELEASE ORAL
Status: DISCONTINUED | OUTPATIENT
Start: 2020-07-11 | End: 2020-07-15 | Stop reason: HOSPADM

## 2020-07-11 RX ORDER — VANCOMYCIN HYDROCHLORIDE 1 G/200ML
20 INJECTION, SOLUTION INTRAVENOUS ONCE
Status: COMPLETED | OUTPATIENT
Start: 2020-07-11 | End: 2020-07-11

## 2020-07-11 RX ORDER — BUDESONIDE 0.5 MG/2ML
0.5 INHALANT ORAL
Status: DISCONTINUED | OUTPATIENT
Start: 2020-07-11 | End: 2020-07-15 | Stop reason: HOSPADM

## 2020-07-11 RX ORDER — ESCITALOPRAM OXALATE 10 MG/1
10 TABLET ORAL DAILY
Status: DISCONTINUED | OUTPATIENT
Start: 2020-07-11 | End: 2020-07-15 | Stop reason: HOSPADM

## 2020-07-11 RX ORDER — ALBUTEROL SULFATE 2.5 MG/3ML
2.5 SOLUTION RESPIRATORY (INHALATION) EVERY 6 HOURS PRN
Status: DISCONTINUED | OUTPATIENT
Start: 2020-07-11 | End: 2020-07-15 | Stop reason: HOSPADM

## 2020-07-11 RX ORDER — DEXTROSE MONOHYDRATE 25 G/50ML
25 INJECTION, SOLUTION INTRAVENOUS
Status: DISCONTINUED | OUTPATIENT
Start: 2020-07-11 | End: 2020-07-15 | Stop reason: HOSPADM

## 2020-07-11 RX ORDER — ACETAMINOPHEN 325 MG/1
650 TABLET ORAL EVERY 4 HOURS PRN
Status: DISCONTINUED | OUTPATIENT
Start: 2020-07-11 | End: 2020-07-15 | Stop reason: HOSPADM

## 2020-07-11 RX ORDER — NICOTINE POLACRILEX 4 MG
15 LOZENGE BUCCAL
Status: DISCONTINUED | OUTPATIENT
Start: 2020-07-11 | End: 2020-07-15 | Stop reason: HOSPADM

## 2020-07-11 RX ORDER — LANOLIN ALCOHOL/MO/W.PET/CERES
5 CREAM (GRAM) TOPICAL NIGHTLY PRN
Status: DISCONTINUED | OUTPATIENT
Start: 2020-07-11 | End: 2020-07-15 | Stop reason: HOSPADM

## 2020-07-11 RX ORDER — FERROUS SULFATE 325(65) MG
325 TABLET ORAL
Status: DISCONTINUED | OUTPATIENT
Start: 2020-07-11 | End: 2020-07-15 | Stop reason: HOSPADM

## 2020-07-11 RX ORDER — HYDROXYZINE HYDROCHLORIDE 25 MG/1
25 TABLET, FILM COATED ORAL EVERY 8 HOURS PRN
Status: DISCONTINUED | OUTPATIENT
Start: 2020-07-11 | End: 2020-07-15 | Stop reason: HOSPADM

## 2020-07-11 RX ORDER — ATORVASTATIN CALCIUM 40 MG/1
80 TABLET, FILM COATED ORAL DAILY
Status: DISCONTINUED | OUTPATIENT
Start: 2020-07-11 | End: 2020-07-15 | Stop reason: HOSPADM

## 2020-07-11 RX ORDER — SODIUM CHLORIDE 0.9 % (FLUSH) 0.9 %
10 SYRINGE (ML) INJECTION AS NEEDED
Status: DISCONTINUED | OUTPATIENT
Start: 2020-07-11 | End: 2020-07-13

## 2020-07-11 RX ORDER — SODIUM CHLORIDE 0.9 % (FLUSH) 0.9 %
10 SYRINGE (ML) INJECTION EVERY 12 HOURS SCHEDULED
Status: DISCONTINUED | OUTPATIENT
Start: 2020-07-11 | End: 2020-07-15 | Stop reason: HOSPADM

## 2020-07-11 RX ORDER — GUAIFENESIN 600 MG/1
1200 TABLET, EXTENDED RELEASE ORAL 2 TIMES DAILY
Status: DISCONTINUED | OUTPATIENT
Start: 2020-07-11 | End: 2020-07-15 | Stop reason: HOSPADM

## 2020-07-11 RX ORDER — VANCOMYCIN HYDROCHLORIDE 1 G/200ML
1000 INJECTION, SOLUTION INTRAVENOUS EVERY 24 HOURS
Status: DISCONTINUED | OUTPATIENT
Start: 2020-07-12 | End: 2020-07-12

## 2020-07-11 RX ORDER — ARFORMOTEROL TARTRATE 15 UG/2ML
15 SOLUTION RESPIRATORY (INHALATION)
Status: DISCONTINUED | OUTPATIENT
Start: 2020-07-11 | End: 2020-07-15 | Stop reason: HOSPADM

## 2020-07-11 RX ORDER — DILTIAZEM HYDROCHLORIDE 120 MG/1
120 CAPSULE, COATED, EXTENDED RELEASE ORAL DAILY
Status: DISCONTINUED | OUTPATIENT
Start: 2020-07-11 | End: 2020-07-15 | Stop reason: HOSPADM

## 2020-07-11 RX ORDER — IPRATROPIUM BROMIDE AND ALBUTEROL SULFATE 2.5; .5 MG/3ML; MG/3ML
3 SOLUTION RESPIRATORY (INHALATION)
Status: DISCONTINUED | OUTPATIENT
Start: 2020-07-11 | End: 2020-07-15 | Stop reason: HOSPADM

## 2020-07-11 RX ORDER — CLOPIDOGREL BISULFATE 75 MG/1
75 TABLET ORAL DAILY
Status: DISCONTINUED | OUTPATIENT
Start: 2020-07-11 | End: 2020-07-15 | Stop reason: HOSPADM

## 2020-07-11 RX ADMIN — ATORVASTATIN CALCIUM 80 MG: 40 TABLET, FILM COATED ORAL at 08:52

## 2020-07-11 RX ADMIN — GUAIFENESIN 1200 MG: 600 TABLET, EXTENDED RELEASE ORAL at 20:23

## 2020-07-11 RX ADMIN — BUDESONIDE 0.5 MG: 0.5 INHALANT RESPIRATORY (INHALATION) at 22:00

## 2020-07-11 RX ADMIN — ESCITALOPRAM 10 MG: 10 TABLET, FILM COATED ORAL at 08:52

## 2020-07-11 RX ADMIN — VANCOMYCIN HYDROCHLORIDE 1000 MG: 1 INJECTION, SOLUTION INTRAVENOUS at 08:51

## 2020-07-11 RX ADMIN — PREDNISONE 20 MG: 20 TABLET ORAL at 13:00

## 2020-07-11 RX ADMIN — SODIUM CHLORIDE, PRESERVATIVE FREE 10 ML: 5 INJECTION INTRAVENOUS at 08:57

## 2020-07-11 RX ADMIN — DILTIAZEM HYDROCHLORIDE 120 MG: 120 CAPSULE, COATED, EXTENDED RELEASE ORAL at 08:52

## 2020-07-11 RX ADMIN — IPRATROPIUM BROMIDE AND ALBUTEROL SULFATE 3 ML: 2.5; .5 SOLUTION RESPIRATORY (INHALATION) at 14:46

## 2020-07-11 RX ADMIN — INSULIN LISPRO 7 UNITS: 100 INJECTION, SOLUTION INTRAVENOUS; SUBCUTANEOUS at 17:38

## 2020-07-11 RX ADMIN — CEFEPIME HYDROCHLORIDE 2 G: 2 INJECTION, POWDER, FOR SOLUTION INTRAVENOUS at 20:23

## 2020-07-11 RX ADMIN — CEFEPIME HYDROCHLORIDE 2 G: 2 INJECTION, POWDER, FOR SOLUTION INTRAVENOUS at 08:52

## 2020-07-11 RX ADMIN — IPRATROPIUM BROMIDE AND ALBUTEROL SULFATE 3 ML: 2.5; .5 SOLUTION RESPIRATORY (INHALATION) at 10:26

## 2020-07-11 RX ADMIN — IPRATROPIUM BROMIDE AND ALBUTEROL SULFATE 3 ML: 2.5; .5 SOLUTION RESPIRATORY (INHALATION) at 02:13

## 2020-07-11 RX ADMIN — FERROUS SULFATE TAB 325 MG (65 MG ELEMENTAL FE) 325 MG: 325 (65 FE) TAB at 13:00

## 2020-07-11 RX ADMIN — IPRATROPIUM BROMIDE AND ALBUTEROL SULFATE 3 ML: 2.5; .5 SOLUTION RESPIRATORY (INHALATION) at 06:31

## 2020-07-11 RX ADMIN — MONTELUKAST SODIUM 10 MG: 10 TABLET, FILM COATED ORAL at 20:23

## 2020-07-11 RX ADMIN — ARFORMOTEROL TARTRATE 15 MCG: 15 SOLUTION RESPIRATORY (INHALATION) at 22:01

## 2020-07-11 RX ADMIN — PANTOPRAZOLE SODIUM 40 MG: 40 TABLET, DELAYED RELEASE ORAL at 06:21

## 2020-07-11 RX ADMIN — IPRATROPIUM BROMIDE AND ALBUTEROL SULFATE 3 ML: 2.5; .5 SOLUTION RESPIRATORY (INHALATION) at 22:01

## 2020-07-11 RX ADMIN — SODIUM CHLORIDE, PRESERVATIVE FREE 10 ML: 5 INJECTION INTRAVENOUS at 20:24

## 2020-07-11 RX ADMIN — INSULIN LISPRO 3 UNITS: 100 INJECTION, SOLUTION INTRAVENOUS; SUBCUTANEOUS at 08:52

## 2020-07-11 RX ADMIN — CLOPIDOGREL 75 MG: 75 TABLET, FILM COATED ORAL at 13:00

## 2020-07-11 RX ADMIN — GUAIFENESIN 1200 MG: 600 TABLET, EXTENDED RELEASE ORAL at 08:52

## 2020-07-11 RX ADMIN — SODIUM CHLORIDE 500 ML: 9 INJECTION, SOLUTION INTRAVENOUS at 01:15

## 2020-07-11 NOTE — PLAN OF CARE
Problem: Patient Care Overview  Goal: Plan of Care Review  Outcome: Ongoing (interventions implemented as appropriate)  Flowsheets (Taken 7/11/2020 0441)  Progress: improving  Plan of Care Reviewed With: patient  Outcome Summary: VSS this shift. 02 sats WNL on 7L high flow. Patient wears CPAP at night. Recieving IV maxipime and IV Vanco for PNA. MRSA swab negative. U/A negative. Safety maintained, no falls. -112 on tele. Will cont to monitor and notify MD of any changes.

## 2020-07-11 NOTE — PROGRESS NOTES
[facility]  INPATIENT PROGRESS NOTE    Length of Stay: 1  Date of Admission: 7/10/2020  Primary Care Physician: Chandan, Sushma, MD    Subjective   Chief Complaint: Follow-up  HPI   Patient resting in bed.  She states she is feeling much better in comparison to admission.  She still has some shortness of breath with exertion.  She is currently on 10 L high flow, normally wears 4 L continuously at home.  She states she has not really been coughing much, and when she does it is very dry.  She denies chest pain or shortness of breath.  She denies abdominal pain, nausea, or vomiting.  She denies tarry or bloody stools, states her stool has mostly been dark green.    Review of Systems   All pertinent negatives and positives are as above. All other systems have been reviewed and are negative unless otherwise stated.     Objective    Temp:  [98.1 °F (36.7 °C)-98.5 °F (36.9 °C)] 98.1 °F (36.7 °C)  Heart Rate:  [] 107  Resp:  [16-25] 16  BP: (110-133)/(58-83) 124/68  Physical Exam   Constitutional: She is oriented to person, place, and time. She appears well-developed and well-nourished. No distress.   HENT:   Head: Normocephalic and atraumatic.   Neck: Normal range of motion. Neck supple. No JVD present. No tracheal deviation present.   Cardiovascular: Regular rhythm and intact distal pulses. Exam reveals no gallop and no friction rub.   Tachycardic.  Sinus tach 105-120 overnight, up to 144.   Pulmonary/Chest: She has wheezes (Faint exp wheeze RLL). She has no rales.   Grossly diminished throughout.  Dyspnea with exertion.   Abdominal: Soft. Bowel sounds are normal. She exhibits no distension. There is no tenderness. There is no guarding.   Musculoskeletal: Normal range of motion. She exhibits no edema or tenderness.   Neurological: She is alert and oriented to person, place, and time. No cranial nerve deficit.   Skin: Skin is warm and dry. No rash noted. No  erythema.   Psychiatric: She has a normal mood and affect. Her behavior is normal. Judgment and thought content normal.   Vitals reviewed.    Results Review:  I have reviewed the labs, radiology results, and diagnostic studies.    Laboratory Data:   Results from last 7 days   Lab Units 07/11/20  0526 07/10/20  1744   WBC 10*3/mm3 11.73* 13.85*   HEMOGLOBIN g/dL 10.0* 10.6*   HEMATOCRIT % 32.3* 33.9*   PLATELETS 10*3/mm3 269 292     Results from last 7 days   Lab Units 07/11/20  0526 07/10/20  1744   SODIUM mmol/L 140 137   POTASSIUM mmol/L 4.0 4.3   CHLORIDE mmol/L 98 96*   CO2 mmol/L 32.0* 28.0   BUN mg/dL 13 16   CREATININE mg/dL 0.68 0.49*   CALCIUM mg/dL 9.2 9.5   BILIRUBIN mg/dL  --  0.2   ALK PHOS U/L  --  76   ALT (SGPT) U/L  --  9   AST (SGOT) U/L  --  14   GLUCOSE mg/dL 238* 289*     Radiology Data:   Imaging Results (Last 24 Hours)     Procedure Component Value Units Date/Time    CT Angiogram Chest [[id number]] Collected:  07/10/20 2123     Updated:  07/10/20 2130    Narrative:       EXAMINATION:  CT ANGIOGRAM CHEST-  7/10/2020 8:57 PM CDT     HISTORY: Shortness of breath. Elevated d-dimer. Left lower lobe  infiltrate on x-ray.     COMPARISON : No comparison study.     DLP: 250 mGy-cm. Automated dosage control was utilized.     TECHNIQUE: CT angio was performed of the chest with contrast. Coronal,  sagittal and 3-D reconstruction were performed.     MEDIASTINUM, HEART AND VASCULAR STRUCTURES: There is atheromatous  disease of the thoracic aorta and coronary arteries. There is no CT  evidence of pulmonary embolus. There are multiple mediastinal lymph  nodes that are borderline in size. The largest has a short axis diameter  of 1.3 cm. The heart is normal in size.     LUNGS: The lungs are emphysematous. There is fairly dense airspace  consolidation in the left lower lobe. There is mild dependent infiltrate  in the right lower lobe likely due to atelectasis. There are small  peripheral infiltrates in the left  upper lobe.     UPPER ABDOMEN: No acute findings.     BONES: There are degenerative changes of the spine. No acute bony  abnormality is seen.       Impression:       1. No CT evidence of pulmonary embolus.  2. CT confirms infiltrates in the left lung, most significant in the  left lower lobe. This is likely due to pneumonia. There is mild  dependent atelectasis in the right lung base.  3. Centrilobular emphysema.  4. Mediastinal lymphadenopathy, likely reactive.     The full report of this exam was immediately signed and available to the  emergency room. The patient is currently in the emergency room.    This report was finalized on 07/10/2020 21:27 by Dr. Pete Jean-Baptiste MD.    XR Chest 1 View [[id number]] Collected:  07/10/20 1842     Updated:  07/10/20 1846    Narrative:       EXAMINATION:  XR CHEST 1 VW-  7/10/2020 6:36 PM CDT     HISTORY: CHF/COPD Protocol     COMPARISON: 05/31/2020.     FINDINGS:  There is patchy infiltrate in the left lung base. The right  lung is essentially clear. Emphysematous changes are noted. Bronchial  wall thickening appears stable. Heart size is normal. Thoracic aorta is  atheromatous. There is a subacute-chronic left humerus fracture.       Impression:       1. New infiltrate in the left lung base, worrisome for pneumonia.  2. Emphysema.        This report was finalized on 07/10/2020 18:42 by Dr. Pete Jean-Baptiste MD.        I have reviewed the patient current medications.     Assessment/Plan     Active Hospital Problems    Diagnosis   • **Pneumonia due to infectious organism   • Acute blood loss anemia   • CHF (congestive heart failure) (CMS/[facility])   • Type 2 diabetes mellitus, without long-term current use of insulin (CMS/[facility])   • CAD (coronary artery disease)   • Chronic obstructive pulmonary disease with acute exacerbation (CMS/[facility])     Plan:  1.  Patient admitted 7/10 with shortness of breath.  She did have an elevated d-dimer level, prompting a CTA of the chest.  This shows  infiltrates in the left lung, most significant in the left lower lobe, likely due to pneumonia.  2.  Patient was initially given Rocephin and azithromycin.  However, given recent hospital admission in May, will change her antibiotic therapy for more appropriate coverage for healthcare associated pneumonia.  Start IV vancomycin and cefepime.  Respiratory culture if the patient is able to produce sputum.  Blood cultures pending.  Urinary antigens for strep and Legionella negative.  MRSA nasal swab was negative, so vancomycin will likely be able to be discontinued tomorrow.  3.  Continue duo nebs-monitor tachycardia closely.  Continue Mucinex, incentive spirometer added.  Low-dose oral prednisone.  4.  Continue to wean supplemental oxygen back to home setting of 4 L as tolerated.  5.  Patient does have worsening anemia compared to last month.  Hemoglobin 12.12 on 6/4, now 10.6 on admission.  An occult blood stool was checked, which was found to be positive.  Do not feel patient has an acute bleed at this time.  Continue home medication oral Protonix.  She has never had a colonoscopy.  This could be arranged by Dr. Burns at time of discharge.  Will start oral iron supplement.  Ok to resume Plavix, will monitor.  6.  Last blood glucoses-238, 202.  Increase to moderate dose sliding scale.  Continue to hold metformin for 48 hours following contrasted study.  7.  PT/OT consults  8.  Labs in AM    Discharge Planning: I expect the patient to be discharged to home with home health in 2-3 days.    Leatha Mobley, APRN   07/11/20   11:31

## 2020-07-11 NOTE — ED PROVIDER NOTES
Subjective   This patient is a 74-year-old female with a known history of COPD/emphysema and CHF.  She presents today because of decreased oxygen saturation at home.  She has a little bit of increase in her shortness of breath and a mild increase in her cough.  She denies any chest pain.  She denies any bright red blood per rectum.            Review of Systems   All other systems reviewed and are negative.      Past Medical History:   Diagnosis Date   • Asthma    • CHF (congestive heart failure) (CMS/HCC)    • COPD (chronic obstructive pulmonary disease) (CMS/HCC)    • Diabetes mellitus (CMS/HCC)    • Emphysema lung (CMS/HCC)    • Hyperlipidemia    • Hypertension    • Mediastinal lymphadenopathy    • Stroke (CMS/HCC)        Allergies   Allergen Reactions   • Tramadol Other (See Comments)     hallucinations   • Latex Rash       Past Surgical History:   Procedure Laterality Date   • CAROTID STENT     • CHOLECYSTECTOMY     • COLONOSCOPY     • ESOPHAGUS SURGERY     • SIGMOIDOSCOPY         Family History   Problem Relation Age of Onset   • Hypertension Mother    • Hypertension Father    • Heart disease Father    • Stroke Father        Social History     Socioeconomic History   • Marital status:      Spouse name: Not on file   • Number of children: Not on file   • Years of education: Not on file   • Highest education level: Not on file   Tobacco Use   • Smoking status: Former Smoker   • Smokeless tobacco: Never Used   Substance and Sexual Activity   • Alcohol use: Yes     Comment: OCCASSIONAL   • Drug use: No   • Sexual activity: Defer           Objective   Physical Exam   Constitutional: She is oriented to person, place, and time. She appears well-developed and well-nourished.   HENT:   Head: Normocephalic and atraumatic.   Mouth/Throat: Oropharynx is clear and moist.   Eyes: Conjunctivae and EOM are normal.   Neck: Normal range of motion. Neck supple.   Cardiovascular: Normal rate, regular rhythm, normal heart  sounds and intact distal pulses.   Pulmonary/Chest: Effort normal. She has decreased breath sounds. She has rhonchi.   Abdominal: Soft. Bowel sounds are normal.   Musculoskeletal: Normal range of motion.   Neurological: She is alert and oriented to person, place, and time.   Skin: Skin is warm and dry. Capillary refill takes less than 2 seconds.   Psychiatric: She has a normal mood and affect. Her behavior is normal. Judgment and thought content normal.   Nursing note and vitals reviewed.      Procedures           ED Course                                           MDM  Number of Diagnoses or Management Options     Amount and/or Complexity of Data Reviewed  Clinical lab tests: reviewed and ordered  Tests in the radiology section of CPT®: reviewed and ordered  Tests in the medicine section of CPT®: reviewed and ordered  Decide to obtain previous medical records or to obtain history from someone other than the patient: yes    Patient Progress  Patient progress: stable      Final diagnoses:   Pneumonia due to infectious organism, unspecified laterality, unspecified part of lung   Gastrointestinal hemorrhage, unspecified gastrointestinal hemorrhage type     The patient's work-up revealed a pneumonia.  With her worsening oxygen saturation I believe she deserves admission to the hospital.  I discussed this case with Dr. Taet who kindly agreed to the admission.  Antibiotics were started in the ED.    The patient is also Hemoccult positive and has a decreased hemoglobin compared to 1 month ago.  Protonix was started in the ED.       Marco John MD  07/10/20 2673

## 2020-07-11 NOTE — PLAN OF CARE
Problem: Fall Risk (Adult)  Goal: Identify Related Risk Factors and Signs and Symptoms  Outcome: Ongoing (interventions implemented as appropriate)  Goal: Absence of Fall  Outcome: Ongoing (interventions implemented as appropriate)     Problem: Patient Care Overview  Goal: Plan of Care Review  Outcome: Ongoing (interventions implemented as appropriate)  Flowsheets (Taken 7/11/2020 0352)  Progress: improving  Plan of Care Reviewed With: patient  Note:   Patient arrived to  admitted for Pneumonia by Dr. Tate.  Patient medications addressed with patient, but she said to double check with her pharmacy for compete accuracy.  Patient up to [facility] with asst, A/O x4, calls for asst.  No falls noted.  Patient received azithromycin and rocephin via IV.  500 ml NS bolus per ER sepsis protocol.  Patient does not have cough at this time.  SOA with any exertion.  Arrived with 10 l/min high flow NC with humidity.  Patient brought home cpap machine, but RT had to put her on Oriental orthodox cpap as she forgot her mask.  Continue to monitor.  Goal: Individualization and Mutuality  Outcome: Ongoing (interventions implemented as appropriate)  Flowsheets (Taken 7/11/2020 0352)  Patient Specific Goals (Include Timeframe): breathing easier  Patient Specific Interventions: O2 regulated by RT  Patient Specific Preferences: bsc  Goal: Discharge Needs Assessment  Outcome: Ongoing (interventions implemented as appropriate)  Goal: Interprofessional Rounds/Family Conf  Outcome: Ongoing (interventions implemented as appropriate)     Problem: Pneumonia (Adult)  Goal: Signs and Symptoms of Listed Potential Problems Will be Absent, Minimized or Managed (Pneumonia)  Outcome: Ongoing (interventions implemented as appropriate)

## 2020-07-11 NOTE — H&P
"    [facility]  HISTORY AND PHYSICAL    Date of Admission: 7/10/2020  Primary Care Physician: Chandan, Sushma, MD    Subjective     Chief Complaint: Shortness of breath    History of Present Illness  74 year old female with PMH of CHF, COPD, DM NID, HTN, CRF on O2 and CPAP at  that presents with complaints of cough and shortness of breath for 2 days. States her oxygen level is lower as well. In the ER she was noted hypoxemic, confirmed with abg, WBC is elevated and she has \"New infiltrate in the left lung base, worrisome for pneumonia\" on CXR.     CBC shows an Hb of 10.6 and stools resulted positive for occult blood. She did not refer symptoms related to this.     Living with her sister for 3 months.    Review of Systems   Otherwise complete ROS reviewed and negative except as mentioned in the HPI.    Past Medical History:   Past Medical History:   Diagnosis Date   • Asthma    • CHF (congestive heart failure) (CMS/HCC)    • COPD (chronic obstructive pulmonary disease) (CMS/HCC)    • Diabetes mellitus (CMS/HCC)    • Emphysema lung (CMS/HCC)    • Hyperlipidemia    • Hypertension    • Mediastinal lymphadenopathy    • Stroke (CMS/HCC)      Past Surgical History:  Past Surgical History:   Procedure Laterality Date   • CAROTID STENT     • CHOLECYSTECTOMY     • COLONOSCOPY     • ESOPHAGUS SURGERY     • SIGMOIDOSCOPY       Social History:  reports that she has quit smoking. She has never used smokeless tobacco. She reports that she drinks alcohol. She reports that she does not use drugs.    Family History: family history includes Heart disease in her father; Hypertension in her father and mother; Stroke in her father.       Allergies:  Allergies   Allergen Reactions   • Tramadol Other (See Comments)     hallucinations   • Latex Rash     Medications:  Prior to Admission medications    Medication Sig Start Date End Date Taking? Authorizing Provider   Alcohol Swabs pads Use " as directed to check blood sugars. 10/8/19   Angelica Olson MD   arformoterol (BROVANA) 15 MCG/2ML nebulizer solution Take 15 mcg by nebulization 2 (Two) Times a Day.    Angelica Olson MD   atorvastatin (LIPITOR) 80 MG tablet Take 80 mg by mouth Daily.    Angelica Olson MD   benzonatate (TESSALON) 200 MG capsule Take 1 capsule by mouth 3 (Three) Times a Day As Needed for Cough. 6/15/20   Arsh Burns DO   budesonide (PULMICORT) 0.5 MG/2ML nebulizer solution Take 0.5 mg by nebulization 2 (Two) Times a Day.    Angelica Olson MD   clopidogrel (PLAVIX) 75 MG tablet Take 75 mg by mouth Daily.    Angelica Olson MD   dilTIAZem CD (CARDIZEM CD) 120 MG 24 hr capsule Take 120 mg by mouth Daily.    Angelica Olson MD   docusate sodium (COLACE) 100 MG capsule Take 100 mg by mouth 2 (Two) Times a Day As Needed for Constipation.    Angelica Olson MD   escitalopram (LEXAPRO) 10 MG tablet Take 10 mg by mouth Daily.    Angelica Olson MD   ferrous sulfate 325 (65 FE) MG tablet Take 325 mg by mouth 2 (Two) Times a Day.    Angelica Olson MD   fluticasone (FLONASE) 50 MCG/ACT nasal spray 2 sprays into the nostril(s) as directed by provider Daily.    Angelica Olson MD   guaiFENesin (MUCINEX) 600 MG 12 hr tablet Take 2 tablets by mouth 2 (Two) Times a Day. 6/15/20   Arsh Burns DO   hydrOXYzine (ATARAX) 25 MG tablet Take 1 tablet by mouth Every 8 (Eight) Hours As Needed for Itching, Allergies or Anxiety. 7/10/19   Gregg Jones MD   ipratropium-albuterol (DUO-NEB) 0.5-2.5 mg/3 ml nebulizer Take 3 mL by nebulization 4 (Four) Times a Day. 7/10/19   Gregg Jones MD   metFORMIN (GLUCOPHAGE) 500 MG tablet Take 500 mg by mouth 2 (Two) Times a Day With Meals.    Angelica Olson MD   montelukast (SINGULAIR) 10 MG tablet Take 1 tablet by mouth Every Night. 6/6/20   Laura Foster, TERRENCE   pantoprazole (PROTONIX) 40 MG EC tablet Take 40 mg by mouth Daily.  "   Provider, MD Angelica   predniSONE (DELTASONE) 20 MG tablet Take 2 tablets by mouth Daily. 6/29/20   Arsh Burns, DO   tiotropium (SPIRIVA) 18 MCG per inhalation capsule Place 1 capsule into inhaler and inhale Daily.    Provider, MD Angelica       Objective     Vital Signs: /67   Pulse 106   Temp 98.5 °F (36.9 °C) (Oral)   Resp 21   Ht 152.4 cm (60\")   Wt 53.2 kg (117 lb 4.8 oz)   SpO2 96%   BMI 22.91 kg/m²   Physical Exam   Constitutional: She is oriented to person, place, and time. She appears well-developed.   Dyspneic at rest   HENT:   Head: Normocephalic and atraumatic.   Right Ear: External ear normal.   Left Ear: External ear normal.   Eyes: Pupils are equal, round, and reactive to light. EOM are normal. Right eye exhibits no discharge. Left eye exhibits no discharge.   Neck: Normal range of motion. Neck supple. No JVD present. No thyromegaly present.   Cardiovascular: Normal heart sounds. Exam reveals no friction rub.   No murmur heard.  Sinus tachycardic   Pulmonary/Chest: No stridor. No respiratory distress. She has wheezes. She has rales. She exhibits no tenderness.   Abdominal: Soft. Bowel sounds are normal. She exhibits no distension and no mass. There is no tenderness. There is no guarding.   Musculoskeletal: Normal range of motion. She exhibits no edema or deformity.   Neurological: She is alert and oriented to person, place, and time. She displays normal reflexes. No cranial nerve deficit. She exhibits normal muscle tone. Coordination normal.   Skin: Skin is warm. Capillary refill takes less than 2 seconds. No rash noted. She is not diaphoretic. No erythema.   Psychiatric: She has a normal mood and affect.     Results Reviewed:  Lab Results (last 24 hours)     Procedure Component Value Units Date/Time    POC Occult Blood Stool [[id number]]  (Abnormal) Collected:  07/10/20 2011    Specimen:  Stool Updated:  07/10/20 2011     Fecal Occult Blood Positive     Lot Number \[id number]\   "     Expiration Date \11/30/2020\     DEVELOPER LOT NUMBER \169\     DEVELOPER EXPIRATION DATE \11/30/2020\     Positive Control Positive     Negative Control Negative    [place] Draw [[id number]] Collected:  07/10/20 1744    Specimen:  Blood Updated:  07/10/20 1845    Narrative:       The following orders were created for panel order [place] Draw.  Procedure                               Abnormality         Status                     ---------                               -----------         ------                     Light Blue Top[[id number]]                                   Final result               Green Top (Gel)[[id number]]                                  Final result               Lavender Top[[id number]]                                     Final result               Red Top[[id number]]                                          Final result                 Please view results for these tests on the individual orders.    Light Blue Top [[id number]] Collected:  07/10/20 1744    Specimen:  Blood Updated:  07/10/20 1845     Extra Tube hold for add-on     Comment: Auto resulted       Green Top (Gel) [[id number]] Collected:  07/10/20 1744    Specimen:  Blood Updated:  07/10/20 1845     Extra Tube Hold for add-ons.     Comment: Auto resulted.       Lavender Top [[id number]] Collected:  07/10/20 1744    Specimen:  Blood Updated:  07/10/20 1845     Extra Tube hold for add-on     Comment: Auto resulted       Red Top [[id number]] Collected:  07/10/20 1744    Specimen:  Blood Updated:  07/10/20 1845     Extra Tube Hold for add-ons.     Comment: Auto resulted.       D-dimer, Quantitative [[id number]]  (Abnormal) Collected:  07/10/20 1744    Specimen:  Blood Updated:  07/10/20 1834     D-Dimer, Quantitative 0.81 mg/L (FEU)     Narrative:       Reference Range is 0-0.50 mg/L FEU. However, results <0.50 mg/L FEU tends to rule out DVT or PE. Results >0.50 mg/L FEU are not useful in predicting absence or presence of DVT or PE.       COVID PRE-OP / PRE-PROCEDURE SCREENING ORDER (NO ISOLATION) - Swab, Nasopharynx [[id number]] Collected:  07/10/20 1735    Specimen:  Swab from Nasopharynx Updated:  07/10/20 1821    Narrative:       The following orders were created for panel order COVID PRE-OP / PRE-PROCEDURE SCREENING ORDER (NO ISOLATION) - Swab, Nasopharynx.  Procedure                               Abnormality         Status                     ---------                               -----------         ------                     COVID-19, ABBOTT IN-HOUS...[[id number]]  Normal              Final result                 Please view results for these tests on the individual orders.    COVID-19, ABBOTT IN-HOUSE,NP Swab (NO TRANSPORT MEDIA) 2 HR TAT - Swab, Nasopharynx [[id number]]  (Normal) Collected:  07/10/20 1735    Specimen:  Swab from Nasopharynx Updated:  07/10/20 1821     COVID19 Not Detected    Narrative:       Fact sheet for providers: https://www.fda.gov/media/234651/download     Fact sheet for patients: https://www.fda.gov/media/794141/download    Comprehensive Metabolic Panel [[id number]]  (Abnormal) Collected:  07/10/20 1744    Specimen:  Blood Updated:  07/10/20 1813     Glucose 289 mg/dL      BUN 16 mg/dL      Creatinine 0.49 mg/dL      Sodium 137 mmol/L      Potassium 4.3 mmol/L      Chloride 96 mmol/L      CO2 28.0 mmol/L      Calcium 9.5 mg/dL      Total Protein 6.3 g/dL      Albumin 3.40 g/dL      ALT (SGPT) 9 U/L      AST (SGOT) 14 U/L      Alkaline Phosphatase 76 U/L      Total Bilirubin 0.2 mg/dL      eGFR Non African Amer 123 mL/min/1.73      Globulin 2.9 gm/dL      A/G Ratio 1.2 g/dL      BUN/Creatinine Ratio 32.7     Anion Gap 13.0 mmol/L     Narrative:       GFR Normal >60  Chronic Kidney Disease <60  Kidney Failure <15      Troponin [[id number]]  (Normal) Collected:  07/10/20 1744    Specimen:  Blood Updated:  07/10/20 1811     Troponin T <0.010 ng/mL     Narrative:       Troponin T Reference Range:  <= 0.03 ng/mL-   Negative  for AMI  >0.03 ng/mL-     Abnormal for myocardial necrosis.  Clinicians would have to utilize clinical acumen, EKG, Troponin and serial changes to determine if it is an Acute Myocardial Infarction or myocardial injury due to an underlying chronic condition.       Results may be falsely decreased if patient taking Biotin.      BNP [[id number]]  (Normal) Collected:  07/10/20 1744    Specimen:  Blood Updated:  07/10/20 1810     proBNP 217.0 pg/mL     Narrative:       Among patients with dyspnea, NT-proBNP is highly sensitive for the detection of acute congestive heart failure. In addition NT-proBNP of <300 pg/ml effectively rules out acute congestive heart failure with 99% negative predictive value.    Results may be falsely decreased if patient taking Biotin.      Lactic Acid, Plasma [[id number]]  (Normal) Collected:  07/10/20 1744    Specimen:  Blood Updated:  07/10/20 1809     Lactate 2.0 mmol/L     Blood Culture - Blood, Arm, Right [[id number]] Collected:  07/10/20 1744    Specimen:  Blood from Arm, Right Updated:  07/10/20 1755    Blood Culture - Blood, Arm, Right [[id number]] Collected:  07/10/20 1740    Specimen:  Blood from Arm, Right Updated:  07/10/20 1755    CBC & Differential [[id number]] Collected:  07/10/20 1744    Specimen:  Blood Updated:  07/10/20 1754    Narrative:       The following orders were created for panel order CBC & Differential.  Procedure                               Abnormality         Status                     ---------                               -----------         ------                     CBC Auto Differential[[id number]]        Abnormal            Final result                 Please view results for these tests on the individual orders.    CBC Auto Differential [[id number]]  (Abnormal) Collected:  07/10/20 1744    Specimen:  Blood Updated:  07/10/20 1754     WBC 13.85 10*3/mm3      RBC 3.92 10*6/mm3      Hemoglobin 10.6 g/dL      Hematocrit 33.9 %      MCV 86.5 fL      MCH 27.0 pg       MCHC 31.3 g/dL      RDW 15.1 %      RDW-SD 47.8 fl      MPV 9.7 fL      Platelets 292 10*3/mm3      Neutrophil % 79.1 %      Lymphocyte % 8.7 %      Monocyte % 10.0 %      Eosinophil % 0.6 %      Basophil % 0.5 %      Immature Grans % 1.1 %      Neutrophils, Absolute 10.94 10*3/mm3      Lymphocytes, Absolute 1.21 10*3/mm3      Monocytes, Absolute 1.39 10*3/mm3      Eosinophils, Absolute 0.09 10*3/mm3      Basophils, Absolute 0.07 10*3/mm3      Immature Grans, Absolute 0.15 10*3/mm3      nRBC 0.0 /100 WBC     Blood Gas, Arterial [[id number]]  (Abnormal) Collected:  07/10/20 1740    Specimen:  Arterial Blood Updated:  07/10/20 1750     Site Right Brachial     Aneesh's Test N/A     pH, Arterial 7.432 pH units      pCO2, Arterial 48.3 mm Hg      Comment: 83 Value above reference range        pO2, Arterial 46.7 mm Hg      Comment: 85 Value below critical limit        HCO3, Arterial 32.2 mmol/L      Comment: 83 Value above reference range        Base Excess, Arterial 6.9 mmol/L      Comment: 83 Value above reference range        O2 Saturation, Arterial 81.2 %      Comment: 84 Value below reference range        Temperature 37.0 C      Barometric Pressure for Blood Gas 749 mmHg      Modality Nasal Cannula     Flow Rate 4.0 lpm      Ventilator Mode NA     Notified Who DR PASCUAL     Notified By [id number]     Notified Time 07/10/2020 17:57     Collected by [id number]     Comment: Meter: [id number]     :  [id number]        pCO2, Temperature Corrected 48.3 mm Hg      pH, Temp Corrected 7.432 pH Units      pO2, Temperature Corrected 46.7 mm Hg         Imaging Results (Last 24 Hours)     Procedure Component Value Units Date/Time    CT Angiogram Chest [[id number]] Collected:  07/10/20 2123     Updated:  07/10/20 2130    Narrative:       EXAMINATION:  CT ANGIOGRAM CHEST-  7/10/2020 8:57 PM CDT     HISTORY: Shortness of breath. Elevated d-dimer. Left lower lobe  infiltrate on x-ray.     COMPARISON : No comparison study.     DLP:  250 mGy-cm. Automated dosage control was utilized.     TECHNIQUE: CT angio was performed of the chest with contrast. Coronal,  sagittal and 3-D reconstruction were performed.     MEDIASTINUM, HEART AND VASCULAR STRUCTURES: There is atheromatous  disease of the thoracic aorta and coronary arteries. There is no CT  evidence of pulmonary embolus. There are multiple mediastinal lymph  nodes that are borderline in size. The largest has a short axis diameter  of 1.3 cm. The heart is normal in size.     LUNGS: The lungs are emphysematous. There is fairly dense airspace  consolidation in the left lower lobe. There is mild dependent infiltrate  in the right lower lobe likely due to atelectasis. There are small  peripheral infiltrates in the left upper lobe.     UPPER ABDOMEN: No acute findings.     BONES: There are degenerative changes of the spine. No acute bony  abnormality is seen.       Impression:       1. No CT evidence of pulmonary embolus.  2. CT confirms infiltrates in the left lung, most significant in the  left lower lobe. This is likely due to pneumonia. There is mild  dependent atelectasis in the right lung base.  3. Centrilobular emphysema.  4. Mediastinal lymphadenopathy, likely reactive.     The full report of this exam was immediately signed and available to the  emergency room. The patient is currently in the emergency room.    This report was finalized on 07/10/2020 21:27 by Dr. Pete Jean-Baptiste MD.    XR Chest 1 View [[id number]] Collected:  07/10/20 1842     Updated:  07/10/20 1846    Narrative:       EXAMINATION:  XR CHEST 1 VW-  7/10/2020 6:36 PM CDT     HISTORY: CHF/COPD Protocol     COMPARISON: 05/31/2020.     FINDINGS:  There is patchy infiltrate in the left lung base. The right  lung is essentially clear. Emphysematous changes are noted. Bronchial  wall thickening appears stable. Heart size is normal. Thoracic aorta is  atheromatous. There is a subacute-chronic left humerus fracture.       Impression:        1. New infiltrate in the left lung base, worrisome for pneumonia.  2. Emphysema.        This report was finalized on 07/10/2020 18:42 by Dr. Pete Jean-Baptiste MD.        I have personally reviewed and interpreted the radiology studies and ECG obtained at time of admission.     Assessment / Plan     Assessment:   Active Hospital Problems    Diagnosis   • **Pneumonia due to infectious organism   • Acute blood loss anemia   • CHF (congestive heart failure) (CMS/[facility])   • Type 2 diabetes mellitus, without long-term current use of insulin (CMS/[facility])   • CAD (coronary artery disease)   • Chronic obstructive pulmonary disease with acute exacerbation (CMS/[facility])     Plan:   Admit to medical floor with telemetry  Vitals every 4 hours.   Up with assistance. Cardiac consistent carbohydrate diet.    Continue Rocephin/Zithromax based on 2019 ATS/IDSA Guidelines for Community-Acquired Pneumonia. Patient has no history of MRSA or pseudomona infection or colonization. Follow cultures.    Oxygen by nasal canula to maintain oxygen saturation at 90%.  CPAP at night home settings.    Duoneb QID, albuterol nebulized as needed    Humalog sliding scale    For anemia > check iron profile, ferritin and reticulocyte count.   Incidental finding for outpatient follow up     COVID negative    Home medications reconciled. Hold Plavix.    Code Status: Full     I discussed the patient's findings and my recommendations with the patient    Estimated length of stay over 2 midnights    Patient seen and examined by me on see below.    Brodie Tate MD   07/10/20   22:59

## 2020-07-12 LAB
ANION GAP SERPL CALCULATED.3IONS-SCNC: 12 MMOL/L (ref 5–15)
BACTERIA BLD CULT: ABNORMAL
BASOPHILS # BLD AUTO: 0.05 10*3/MM3 (ref 0–0.2)
BASOPHILS NFR BLD AUTO: 0.6 % (ref 0–1.5)
BUN SERPL-MCNC: 17 MG/DL (ref 8–23)
BUN/CREAT SERPL: 34 (ref 7–25)
CALCIUM SPEC-SCNC: 10 MG/DL (ref 8.6–10.5)
CHLORIDE SERPL-SCNC: 94 MMOL/L (ref 98–107)
CO2 SERPL-SCNC: 30 MMOL/L (ref 22–29)
CREAT SERPL-MCNC: 0.5 MG/DL (ref 0.57–1)
DEPRECATED RDW RBC AUTO: 47.5 FL (ref 37–54)
EOSINOPHIL # BLD AUTO: 0 10*3/MM3 (ref 0–0.4)
EOSINOPHIL NFR BLD AUTO: 0 % (ref 0.3–6.2)
ERYTHROCYTE [DISTWIDTH] IN BLOOD BY AUTOMATED COUNT: 14.9 % (ref 12.3–15.4)
GFR SERPL CREATININE-BSD FRML MDRD: 121 ML/MIN/1.73
GLUCOSE BLDC GLUCOMTR-MCNC: 259 MG/DL (ref 70–130)
GLUCOSE BLDC GLUCOMTR-MCNC: 325 MG/DL (ref 70–130)
GLUCOSE BLDC GLUCOMTR-MCNC: 339 MG/DL (ref 70–130)
GLUCOSE BLDC GLUCOMTR-MCNC: 354 MG/DL (ref 70–130)
GLUCOSE BLDC GLUCOMTR-MCNC: 356 MG/DL (ref 70–130)
GLUCOSE SERPL-MCNC: 314 MG/DL (ref 65–99)
HCT VFR BLD AUTO: 34.8 % (ref 34–46.6)
HGB BLD-MCNC: 10.8 G/DL (ref 12–15.9)
IMM GRANULOCYTES # BLD AUTO: 0.05 10*3/MM3 (ref 0–0.05)
IMM GRANULOCYTES NFR BLD AUTO: 0.6 % (ref 0–0.5)
LYMPHOCYTES # BLD AUTO: 0.59 10*3/MM3 (ref 0.7–3.1)
LYMPHOCYTES NFR BLD AUTO: 6.6 % (ref 19.6–45.3)
MCH RBC QN AUTO: 26.9 PG (ref 26.6–33)
MCHC RBC AUTO-ENTMCNC: 31 G/DL (ref 31.5–35.7)
MCV RBC AUTO: 86.6 FL (ref 79–97)
MONOCYTES # BLD AUTO: 0.29 10*3/MM3 (ref 0.1–0.9)
MONOCYTES NFR BLD AUTO: 3.3 % (ref 5–12)
NEUTROPHILS NFR BLD AUTO: 7.92 10*3/MM3 (ref 1.7–7)
NEUTROPHILS NFR BLD AUTO: 88.9 % (ref 42.7–76)
NRBC BLD AUTO-RTO: 0 /100 WBC (ref 0–0.2)
PLATELET # BLD AUTO: 283 10*3/MM3 (ref 140–450)
PMV BLD AUTO: 10.1 FL (ref 6–12)
POTASSIUM SERPL-SCNC: 4.6 MMOL/L (ref 3.5–5.2)
RBC # BLD AUTO: 4.02 10*6/MM3 (ref 3.77–5.28)
SODIUM SERPL-SCNC: 136 MMOL/L (ref 136–145)
WBC # BLD AUTO: 8.9 10*3/MM3 (ref 3.4–10.8)

## 2020-07-12 PROCEDURE — 87205 SMEAR GRAM STAIN: CPT | Performed by: NURSE PRACTITIONER

## 2020-07-12 PROCEDURE — 94799 UNLISTED PULMONARY SVC/PX: CPT

## 2020-07-12 PROCEDURE — 97165 OT EVAL LOW COMPLEX 30 MIN: CPT | Performed by: OCCUPATIONAL THERAPIST

## 2020-07-12 PROCEDURE — 25010000002 VANCOMYCIN PER 500 MG: Performed by: NURSE PRACTITIONER

## 2020-07-12 PROCEDURE — 82962 GLUCOSE BLOOD TEST: CPT

## 2020-07-12 PROCEDURE — 85025 COMPLETE CBC W/AUTO DIFF WBC: CPT | Performed by: FAMILY MEDICINE

## 2020-07-12 PROCEDURE — 63710000001 INSULIN LISPRO (HUMAN) PER 5 UNITS: Performed by: NURSE PRACTITIONER

## 2020-07-12 PROCEDURE — 63710000001 PREDNISONE PER 1 MG: Performed by: NURSE PRACTITIONER

## 2020-07-12 PROCEDURE — 80048 BASIC METABOLIC PNL TOTAL CA: CPT | Performed by: FAMILY MEDICINE

## 2020-07-12 PROCEDURE — 87070 CULTURE OTHR SPECIMN AEROBIC: CPT | Performed by: NURSE PRACTITIONER

## 2020-07-12 PROCEDURE — 25010000002 CEFEPIME PER 500 MG: Performed by: NURSE PRACTITIONER

## 2020-07-12 RX ADMIN — GUAIFENESIN 1200 MG: 600 TABLET, EXTENDED RELEASE ORAL at 08:16

## 2020-07-12 RX ADMIN — SODIUM CHLORIDE, PRESERVATIVE FREE 10 ML: 5 INJECTION INTRAVENOUS at 20:31

## 2020-07-12 RX ADMIN — IPRATROPIUM BROMIDE AND ALBUTEROL SULFATE 3 ML: 2.5; .5 SOLUTION RESPIRATORY (INHALATION) at 10:40

## 2020-07-12 RX ADMIN — INSULIN LISPRO 16 UNITS: 100 INJECTION, SOLUTION INTRAVENOUS; SUBCUTANEOUS at 17:22

## 2020-07-12 RX ADMIN — CEFEPIME HYDROCHLORIDE 2 G: 2 INJECTION, POWDER, FOR SOLUTION INTRAVENOUS at 20:31

## 2020-07-12 RX ADMIN — IPRATROPIUM BROMIDE AND ALBUTEROL SULFATE 3 ML: 2.5; .5 SOLUTION RESPIRATORY (INHALATION) at 20:57

## 2020-07-12 RX ADMIN — PANTOPRAZOLE SODIUM 40 MG: 40 TABLET, DELAYED RELEASE ORAL at 08:26

## 2020-07-12 RX ADMIN — MONTELUKAST SODIUM 10 MG: 10 TABLET, FILM COATED ORAL at 20:31

## 2020-07-12 RX ADMIN — INSULIN LISPRO 20 UNITS: 100 INJECTION, SOLUTION INTRAVENOUS; SUBCUTANEOUS at 11:42

## 2020-07-12 RX ADMIN — BUDESONIDE 0.5 MG: 0.5 INHALANT RESPIRATORY (INHALATION) at 20:58

## 2020-07-12 RX ADMIN — ATORVASTATIN CALCIUM 80 MG: 40 TABLET, FILM COATED ORAL at 08:17

## 2020-07-12 RX ADMIN — DILTIAZEM HYDROCHLORIDE 120 MG: 120 CAPSULE, COATED, EXTENDED RELEASE ORAL at 08:17

## 2020-07-12 RX ADMIN — ARFORMOTEROL TARTRATE 15 MCG: 15 SOLUTION RESPIRATORY (INHALATION) at 20:58

## 2020-07-12 RX ADMIN — VANCOMYCIN HYDROCHLORIDE 1000 MG: 1 INJECTION, SOLUTION INTRAVENOUS at 08:17

## 2020-07-12 RX ADMIN — IPRATROPIUM BROMIDE AND ALBUTEROL SULFATE 3 ML: 2.5; .5 SOLUTION RESPIRATORY (INHALATION) at 14:56

## 2020-07-12 RX ADMIN — BUDESONIDE 0.5 MG: 0.5 INHALANT RESPIRATORY (INHALATION) at 07:13

## 2020-07-12 RX ADMIN — IPRATROPIUM BROMIDE AND ALBUTEROL SULFATE 3 ML: 2.5; .5 SOLUTION RESPIRATORY (INHALATION) at 07:06

## 2020-07-12 RX ADMIN — ARFORMOTEROL TARTRATE 15 MCG: 15 SOLUTION RESPIRATORY (INHALATION) at 07:18

## 2020-07-12 RX ADMIN — CEFEPIME HYDROCHLORIDE 2 G: 2 INJECTION, POWDER, FOR SOLUTION INTRAVENOUS at 08:17

## 2020-07-12 RX ADMIN — ESCITALOPRAM 10 MG: 10 TABLET, FILM COATED ORAL at 08:17

## 2020-07-12 RX ADMIN — PREDNISONE 20 MG: 20 TABLET ORAL at 08:16

## 2020-07-12 RX ADMIN — GUAIFENESIN 1200 MG: 600 TABLET, EXTENDED RELEASE ORAL at 20:31

## 2020-07-12 RX ADMIN — FERROUS SULFATE TAB 325 MG (65 MG ELEMENTAL FE) 325 MG: 325 (65 FE) TAB at 08:17

## 2020-07-12 RX ADMIN — CLOPIDOGREL 75 MG: 75 TABLET, FILM COATED ORAL at 08:16

## 2020-07-12 RX ADMIN — INSULIN LISPRO 6 UNITS: 100 INJECTION, SOLUTION INTRAVENOUS; SUBCUTANEOUS at 08:25

## 2020-07-12 RX ADMIN — SODIUM CHLORIDE, PRESERVATIVE FREE 10 ML: 5 INJECTION INTRAVENOUS at 08:18

## 2020-07-12 NOTE — PLAN OF CARE
Problem: Patient Care Overview  Goal: Plan of Care Review  Flowsheets (Taken 7/12/2020 3204)  Plan of Care Reviewed With: patient  Outcome Summary: OT eval completed. Pt is A&Ox4. Demo's decreased strength, balance, activity tolerance and increased SOB with activity. Supervision for supine to sit at EOB. Indepednently able to adjust socks seated at EOB. CGA for sit <> stand t/f from EOB and all functional mobility with rwx and supplemental O2/NC. Pt required one recovery period during mobility d/t increased SOB and decreased activity tolerance. Min A for toileting. Pt would benefit from skilled OT services to address these deficits. Recommend d/c home with assist and HH.

## 2020-07-12 NOTE — PLAN OF CARE
Problem: Patient Care Overview  Goal: Plan of Care Review  Outcome: Ongoing (interventions implemented as appropriate)  Flowsheets (Taken 7/12/2020 1501)  Plan of Care Reviewed With: patient  Outcome Summary: VSS this shift. No complaints of pain. Patient recieving IV maxipime amd IV vanco. Patient currently weaned down to 5L NC high flow. Respiratory culture sent. SSI dosage increased. Safety maintained, no falls. Will cont to monitor and notify MD of any changes.

## 2020-07-12 NOTE — PROGRESS NOTES
[facility]  INPATIENT PROGRESS NOTE    Length of Stay: 2  Date of Admission: 7/10/2020  Primary Care Physician: Chandan, Sushma, MD    Subjective   Chief Complaint: Follow-up shortness of breath  HPI   Patient resting in bed.  She states she continues to feel a little better each day.  She still has some shortness of breath with exertion, but does feel closer to her baseline.  She is currently on 5 L of oxygen, normally wears 4 continuously.  She denies any chest pain.  She was able to cough up a little bit of gray sputum earlier this morning.  She has not been coughing much.  She is eating and drinking well.    Review of Systems   All pertinent negatives and positives are as above. All other systems have been reviewed and are negative unless otherwise stated.     Objective    Temp:  [97.5 °F (36.4 °C)-98.1 °F (36.7 °C)] 97.6 °F (36.4 °C)  Heart Rate:  [] 102  Resp:  [16-18] 18  BP: (102-145)/(50-80) 109/50  Physical Exam  Constitutional: She is oriented to person, place, and time. She appears well-developed and well-nourished. No distress.   HENT:   Head: Normocephalic and atraumatic.   Neck: Normal range of motion. Neck supple. No JVD present. No tracheal deviation present.   Cardiovascular: Regular rhythm and intact distal pulses. Exam reveals no gallop and no friction rub.     Status-sinus tach   Pulmonary/Chest: She has no wheezes. She has no rales.   Grossly diminished throughout.  Dyspnea with exertion.   Abdominal: Soft. Bowel sounds are normal. She exhibits no distension. There is no tenderness. There is no guarding.   Musculoskeletal: Normal range of motion. She exhibits no edema or tenderness.   Neurological: She is alert and oriented to person, place, and time. No cranial nerve deficit.   Skin: Skin is warm and dry. No rash noted. No erythema.   Psychiatric: She has a normal mood and affect. Her behavior is normal. Judgment and thought  content normal.   Vitals reviewed.    Results Review:  I have reviewed the labs, radiology results, and diagnostic studies.    Laboratory Data:   Results from last 7 days   Lab Units 07/12/20  0209 07/11/20  0526 07/10/20  1744   WBC 10*3/mm3 8.90 11.73* 13.85*   HEMOGLOBIN g/dL 10.8* 10.0* 10.6*   HEMATOCRIT % 34.8 32.3* 33.9*   PLATELETS 10*3/mm3 283 269 292     Results from last 7 days   Lab Units 07/12/20  0209 07/11/20  0526 07/10/20  1744   SODIUM mmol/L 136 140 137   POTASSIUM mmol/L 4.6 4.0 4.3   CHLORIDE mmol/L 94* 98 96*   CO2 mmol/L 30.0* 32.0* 28.0   BUN mg/dL 17 13 16   CREATININE mg/dL 0.50* 0.68 0.49*   CALCIUM mg/dL 10.0 9.2 9.5   BILIRUBIN mg/dL  --   --  0.2   ALK PHOS U/L  --   --  76   ALT (SGPT) U/L  --   --  9   AST (SGOT) U/L  --   --  14   GLUCOSE mg/dL 314* 238* 289*     I have reviewed the patient current medications.     Assessment/Plan     Active Hospital Problems    Diagnosis   • **Pneumonia due to infectious organism   • Acute blood loss anemia   • Iron deficiency anemia   • Acute on chronic respiratory failure with hypoxia and hypercapnia (CMS/HCC)   • Type 2 diabetes mellitus, without long-term current use of insulin (CMS/[facility])   • CAD (coronary artery disease)   • Chronic obstructive pulmonary disease with acute exacerbation (CMS/[facility])     Plan:  1.  Patient admitted 7/10 with shortness of breath.  She did have an elevated d-dimer level, prompting a CTA of the chest.  This showed infiltrates in the left lung, most significant in the left lower lobe, likely due to pneumonia.  2.  Patient was initially given Rocephin and azithromycin.  However, given her recent hospital admission in May, her antibiotic therapy was changed to vancomycin and cefepime yesterday.  Urinary antigens for strep and Legionella are negative.  MRSA nasal swab was negative.  Respiratory culture is pending.  Patient did have 1 bottle of blood cultures positive with a Staphylococcus, not aureus species.  Feel this is  most likely a contaminant.  Afebrile, WBC normalized.  Will continue to monitor closely.  Will discontinue vancomycin after today.  3.  Continue home medications Brovana and Pulmicort nebulizers.  Continue duo nebs, Mucinex, and encouraged use of incentive spirometer.  Continue low-dose prednisone.  Continue to wean supplemental oxygen back to home setting of 4 L as tolerated.  4.  Last blood glucoses- 314, 259, 356.  Increase to high-dose sliding scale.  May resume metformin tomorrow.  5.  Patient does have worsening anemia compared to last month.  Hemoglobin 12.12 on 6/4, now 10.6 on admission.  Occult blood stool was found to be positive, however patient is not thought to have an acute bleed.  Continue oral Protonix and Plavix.  Hemoglobin stable today at 10.8.  Patient has never had a colonoscopy.  This could be arranged by Dr. Burns at time of discharge.  6.  Encouraged patient to sit up to the chair with meals today.  7.  Lab holiday in a.m.    Discharge Planning: I expect the patient to be discharged to home with home health in 2 days.    Leatha Mobley, TERRENCE   07/12/20   11:36

## 2020-07-12 NOTE — PLAN OF CARE
Problem: Patient Care Overview  Goal: Plan of Care Review  7/12/2020 0614 by Jessica Stover RN  Flow sheets  Taken 7/12/2020 0614  Progress: no change  Plan of Care Reviewed With: patient  Taken 7/12/2020 0607  Outcome Summary: No C/O pain this shift. Patient on 7 liters high flow NC at beginning of shift. Home C-pap put on with oxygen for sleep. IV antibiotics given this shift. Positive blood cultures called  Note:   Positive blood cultures called. MD on call notified with no new orders given. Patient currently on Cefepime and Vanco. Safety maintained. Will continue to monitor.

## 2020-07-12 NOTE — THERAPY EVALUATION
Acute Care - Occupational Therapy Initial Evaluation  [facility]     Patient Name: Jessica Gregg  : 1945  MRN: [id number]  Today's Date: 2020  Onset of Illness/Injury or Date of Surgery: 07/10/20  Date of Referral to OT: 20  Referring Physician: Dr. Parker    Admit Date: 7/10/2020       ICD-10-CM ICD-9-CM   1. Pneumonia due to infectious organism, unspecified laterality, unspecified part of lung J18.9 486   2. Gastrointestinal hemorrhage, unspecified gastrointestinal hemorrhage type K92.2 578.9   3. Decreased activities of daily living (ADL) Z78.9 V49.89     Patient Active Problem List   Diagnosis   • Chronic obstructive pulmonary disease with acute exacerbation (CMS/HCC)   • CHF (congestive heart failure) (CMS/HCC)   • Type 2 diabetes mellitus, without long-term current use of insulin (CMS/HCC)   • CAD (coronary artery disease)   • COPD exacerbation (CMS/[facility])   • Acute on chronic respiratory failure with hypoxia and hypercapnia (CMS/HCC)   • Pneumonia due to infectious organism   • Acute blood loss anemia   • Iron deficiency anemia     Past Medical History:   Diagnosis Date   • Asthma    • CHF (congestive heart failure) (CMS/HCC)    • COPD (chronic obstructive pulmonary disease) (CMS/HCC)    • Diabetes mellitus (CMS/HCC)    • Emphysema lung (CMS/HCC)    • Hyperlipidemia    • Hypertension    • Mediastinal lymphadenopathy    • Stroke (CMS/HCC)      Past Surgical History:   Procedure Laterality Date   • CAROTID STENT     • CHOLECYSTECTOMY     • COLONOSCOPY     • ESOPHAGUS SURGERY     • SIGMOIDOSCOPY            OT ASSESSMENT FLOWSHEET (last 12 hours)      Occupational Therapy Evaluation     Row Name 20 1415                   OT Evaluation Time/Intention    Subjective Information  no complaints  -JJ        Document Type  evaluation see MAR  -JJ        Mode of Treatment  occupational therapy  -JJ        Patient Effort  good  -JJ           General Information    Patient Profile Reviewed?  yes  -JJ         Onset of Illness/Injury or Date of Surgery  07/10/20  -JJ        Referring Physician  Dr. Parker  -JJ        Patient Observations  alert;cooperative;agree to therapy  -JJ        Patient/Family Observations  no family present in room   -JJ        General Observations of Patient  fowlers in bed, IV infusing, alert   -JJ        Prior Level of Function  independent:;all household mobility;community mobility;transfer;bed mobility;ADL's  -JJ        Equipment Currently Used at Home  shower chair;cane, quad;rollator;oxygen  -JJ        Pertinent History of Current Functional Problem  Pt presented with c/o decreased O2 saturation, SOB, and cough. Pt has a hx of COPD/emphysema, CHF, DM, HTN, CRT on O2 and CPAP. Dx: Gi hemorrhage, pneumonia, anemia.   -JJ        Existing Precautions/Restrictions  fall;oxygen therapy device and L/min  -JJ        Risks Reviewed  patient:;LOB;nausea/vomiting;dizziness;increased discomfort;change in vital signs;increased drainage;lines disloged  -JJ        Benefits Reviewed  patient:;improve function;increase independence;increase strength;increase balance;decrease pain;decrease risk of DVT;improve skin integrity;increase knowledge  -JJ           Relationship/Environment    Lives With  sibling(s)  -JJ           Resource/Environmental Concerns    Current Living Arrangements  home/apartment/condo  -J           Home Main Entrance    Number of Stairs, Main Entrance  one  -JJ        Stair Railings, Main Entrance  none  -JJ           Cognitive Assessment/Interventions    Additional Documentation  Cognitive Assessment/Intervention (Group)  -JJ           Cognitive Assessment/Intervention- PT/OT    Affect/Mental Status (Cognitive)  WFL  -JJ        Orientation Status (Cognition)  oriented x 4  -JJ        Personal Safety Interventions  fall prevention program maintained;gait belt;muscle strengthening facilitated;nonskid shoes/slippers when out of bed;supervised activity  -JJ           Safety Issues,  Functional Mobility    Impairments Affecting Function (Mobility)  balance;endurance/activity tolerance;shortness of breath;strength  -           Bed Mobility Assessment/Treatment    Bed Mobility Assessment/Treatment  supine-sit  -        Supine-Sit [place] (Bed Mobility)  supervision  -        Assistive Device (Bed Mobility)  head of bed elevated  -           Functional Mobility    Functional Mobility- Ind. Level  standby assist  -        Functional Mobility- Device  rolling walker  -        Functional Mobility- Safety Issues  supplemental O2  -        Functional Mobility- Comment  down hallway, to bathroom and back to chair   -           Transfer Assessment/Treatment    Transfer Assessment/Treatment  sit-stand transfer;stand-sit transfer;toilet transfer  -           Sit-Stand Transfer    Sit-Stand [place] (Transfers)  contact guard  -           Stand-Sit Transfer    Stand-Sit [place] (Transfers)  contact guard  -           Toilet Transfer    Type (Toilet Transfer)  sit-stand;stand-sit  -        [place] Level (Toilet Transfer)  contact guard  -        Assistive Device (Toilet Transfer)  commode;grab bars/safety frame;walker, front-wheeled  -           ADL Assessment/Intervention    BADL Assessment/Intervention  lower body dressing;toileting  -           Lower Body Dressing Assessment/Training    Lower Body Dressing [place] Level  socks;independent adjust B socks  -        Lower Body Dressing Position  edge of bed sitting  -           Toileting Assessment/Training    [place] Level (Toileting)  perform perineal hygiene;supervision;adjust/manage clothing;minimum assist (75% patient effort)  -        Assistive Devices (Toileting)  commode;grab bar/safety frame  -        Toileting Position  unsupported sitting  -           BADL Safety/Performance    Impairments, BADL Safety/Performance  balance;endurance/activity tolerance;strength;shortness of breath   -JJ        Skilled BADL Treatment/Intervention  BADL process/adaptation training  -JJ           General ROM    GENERAL ROM COMMENTS  BUE AROM WFL   -JJ           MMT (Manual Muscle Testing)    General MMT Comments  BUE strength grossly 4/5  -JJ           Motor Assessment/Interventions    Additional Documentation  Balance (Group)  -JJ           Balance    Balance  static sitting balance;static standing balance  -JJ           Static Sitting Balance    Level of [place] (Unsupported Sitting, Static Balance)  independent  -JJ        Sitting Position (Unsupported Sitting, Static Balance)  sitting on edge of bed;sitting in chair  -JJ           Static Standing Balance    Level of [place] (Supported Standing, Static Balance)  contact guard assist  -JJ        Assistive Device Utilized (Supported Standing, Static Balance)  walker, rolling  -JJ           Sensory Assessment/Intervention    Sensory General Assessment  no sensation deficits identified  -JJ           Positioning and Restraints    Pre-Treatment Position  in bed  -JJ        Post Treatment Position  chair  -JJ        In Chair  reclined;notified nsg;call light within reach;encouraged to call for assist  -JJ           Pain Assessment    Additional Documentation  Pain Scale: Numbers Pre/Post-Treatment (Group)  -JJ           Pain Scale: Numbers Pre/Post-Treatment    Pain Scale: Numbers, Pretreatment  0/10 - no pain  -JJ        Pain Scale: Numbers, Post-Treatment  0/10 - no pain  -JJ           Plan of Care Review    Plan of Care Reviewed With  patient  -JJ        Outcome Summary  OT lila completed. Pt is A&Ox4. Demo's decreased strength, balance, activity tolerance and increased SOB with activity. Supervision for supine to sit at EOB. Indepednently able to adjust socks seated at EOB. CGA for sit <> stand t/f from EOB and all functional mobility with rwx and supplemental O2/NC. Pt required one recovery period during mobility d/t increased SOB and decreased  activity tolerance. Min A for toileting. Pt would benefit from skilled OT services to address these deficits. Recommend d/c home with assist and .   -JJ           Clinical Impression (OT)    Date of Referral to OT  07/11/20  -J        OT Diagnosis  decreased adls  -JJ        Prognosis (OT Eval)  good  -JJ        Patient/Family Goals Statement (OT Eval)  return home  -J        Criteria for Skilled Therapeutic Interventions Met (OT Eval)  yes;treatment indicated  -JJ        Rehab Potential (OT Eval)  good, to achieve stated therapy goals  -JJ        Therapy Frequency (OT Eval)  3 times/wk  -J        Predicted Duration of Therapy Intervention (Therapy Eval)  10 days  -J        Care Plan Review (OT)  evaluation/treatment results reviewed;care plan/treatment goals reviewed;risks/benefits reviewed;current/potential barriers reviewed;patient/other agree to care plan  -JJ        Anticipated Discharge Disposition (OT)  home with assist;home with home health  -JJ           Planned OT Interventions    Planned Therapy Interventions (OT Eval)  activity tolerance training;BADL retraining;functional balance retraining;occupation/activity based interventions;patient/caregiver education/training;strengthening exercise;transfer/mobility retraining  -JJ           OT Goals    Bathing Goal Selection (OT)  bathing, OT goal 1  -JJ        Toileting Goal Selection (OT)  toileting, OT goal 1  -JJ        Activity Tolerance Goal Selection (OT)  activity tolerance, OT goal 1  -JJ        Additional Documentation  Activity Tolerance Goal Selection (OT) (Row)  -JJ           Bathing Goal 1 (OT)    Activity/Assistive Device (Bathing Goal 1, OT)  bathing skills, all  -JJ        [place] Level/Cues Needed (Bathing Goal 1, OT)  conditional independence  -JJ        Time Frame (Bathing Goal 1, OT)  long term goal (LTG);by discharge  -JJ        Progress/Outcomes (Bathing Goal 1, OT)  goal ongoing  -J           Toileting Goal 1 (OT)     Activity/Device (Toileting Goal 1, OT)  toileting skills, all;commode  -JJ        [place] Level/Cues Needed (Toileting Goal 1, OT)  independent  -JJ        Time Frame (Toileting Goal 1, OT)  long term goal (LTG);by discharge  -JJ        Progress/Outcome (Toileting Goal 1, OT)  goal ongoing  -JJ            Activity Tolerance Goal 1 (OT)    Activity Tolerance Goal 1 (OT)  Pt will complete morning adl routine with no recovery periods in standing to address decreased activity tolerance.   -JJ        Activity Level (Endurance Goal 1, OT)  10 min activity;O2 sat >/ equal to 88%  -JJ        Time Frame (Activity Tolerance Goal 1, OT)  long term goal (LTG);by discharge  -JJ        Progress/Outcome (Activity Tolerance Goal 1, OT)  goal ongoing  -JJ           Living Environment    Home Accessibility  tub/shower is not walk in;stairs to enter home  -JJ          User Key  (r) = Recorded By, (t) = Taken By, (c) = Cosigned By    Initials Name Effective Dates    Magdalena Mann OTR/L 07/05/20 -          Occupational Therapy Education                 Title: PT OT SLP Therapies (Done)     Topic: Occupational Therapy (Done)     Point: ADL training (Done)     Description:   Instruct learner(s) on proper safety adaptation and remediation techniques during self care or transfers.   Instruct in proper use of assistive devices.              Learning Progress Summary           Patient Acceptance, E, VU by SANDRA at 7/12/2020 1509                   Point: Home exercise program (Done)     Description:   Instruct learner(s) on appropriate technique for monitoring, assisting and/or progressing therapeutic exercises/activities.              Learning Progress Summary           Patient Acceptance, E, VU by SANDRA at 7/12/2020 1509                   Point: Precautions (Done)     Description:   Instruct learner(s) on prescribed precautions during self-care and functional transfers.              Learning Progress Summary           Patient  Acceptance, E, VU by  at 7/12/2020 1509                   Point: Body mechanics (Done)     Description:   Instruct learner(s) on proper positioning and spine alignment during self-care, functional mobility activities and/or exercises.              Learning Progress Summary           Patient Acceptance, E, VU by  at 7/12/2020 1509                               User Key     Initials Effective Dates Name Provider Type Discipline     07/05/20 -  Magdalena Winkler OTR/L Occupational Therapist OT                  OT Recommendation and Plan  Outcome Summary/Treatment Plan (OT)  Anticipated Discharge Disposition (OT): home with assist, home with home health  Planned Therapy Interventions (OT Eval): activity tolerance training, BADL retraining, functional balance retraining, occupation/activity based interventions, patient/caregiver education/training, strengthening exercise, transfer/mobility retraining  Therapy Frequency (OT Eval): 3 times/wk  Plan of Care Review  Plan of Care Reviewed With: patient  Plan of Care Reviewed With: patient  Outcome Summary: OT eval completed. Pt is A&Ox4. Demo's decreased strength, balance, activity tolerance and increased SOB with activity. Supervision for supine to sit at EOB. Indepednently able to adjust socks seated at EOB. CGA for sit <> stand t/f from EOB and all functional mobility with rwx and supplemental O2/NC. Pt required one recovery period during mobility d/t increased SOB and decreased activity tolerance. Min A for toileting. Pt would benefit from skilled OT services to address these deficits. Recommend d/c home with assist and HH.    Outcome Measures     Row Name 07/12/20 1500             How much help from another is currently needed...    Putting on and taking off regular lower body clothing?  4  -JJ      Bathing (including washing, rinsing, and drying)  3  -JJ      Toileting (which includes using toilet bed pan or urinal)  3  -JJ      Putting on and taking off regular  upper body clothing  4  -JJ      Taking care of personal grooming (such as brushing teeth)  4  -JJ      Eating meals  4  -J      AM-PAC 6 Clicks Score (OT)  22  -         Functional Assessment    Outcome Measure Options  AM-PAC 6 Clicks Daily Activity (OT)  -        User Key  (r) = Recorded By, (t) = Taken By, (c) = Cosigned By    Initials Name Provider Type    Magdalena Mann OTR/L Occupational Therapist          Time Calculation:   Time Calculation- OT     Row Name 07/12/20 1504             Time Calculation- OT    OT Start Time  1415  -      OT Stop Time  1454  -      OT Time Calculation (min)  39 min  -      OT Received On  07/12/20  -      OT Goal Re-Cert Due Date  07/22/20  -        User Key  (r) = Recorded By, (t) = Taken By, (c) = Cosigned By    Initials Name Provider Type    Magdalena Mann OTR/L Occupational Therapist        Therapy Charges for Today     Code Description Service Date Service Provider Modifiers Qty    [id number]  OT EVAL LOW COMPLEXITY 3 7/12/2020 Magdalena Winkler OTR/L GO 1               JOHANNA Campo/ANTIONE  7/12/2020

## 2020-07-13 LAB
ANION GAP SERPL CALCULATED.3IONS-SCNC: 11 MMOL/L (ref 5–15)
BACTERIA SPEC AEROBE CULT: ABNORMAL
BASOPHILS # BLD AUTO: 0.06 10*3/MM3 (ref 0–0.2)
BASOPHILS NFR BLD AUTO: 0.4 % (ref 0–1.5)
BUN SERPL-MCNC: 18 MG/DL (ref 8–23)
BUN/CREAT SERPL: 30 (ref 7–25)
CALCIUM SPEC-SCNC: 9.4 MG/DL (ref 8.6–10.5)
CHLORIDE SERPL-SCNC: 98 MMOL/L (ref 98–107)
CO2 SERPL-SCNC: 30 MMOL/L (ref 22–29)
CREAT SERPL-MCNC: 0.6 MG/DL (ref 0.57–1)
DEPRECATED RDW RBC AUTO: 48 FL (ref 37–54)
EOSINOPHIL # BLD AUTO: 0.04 10*3/MM3 (ref 0–0.4)
EOSINOPHIL NFR BLD AUTO: 0.3 % (ref 0.3–6.2)
ERYTHROCYTE [DISTWIDTH] IN BLOOD BY AUTOMATED COUNT: 15 % (ref 12.3–15.4)
GFR SERPL CREATININE-BSD FRML MDRD: 98 ML/MIN/1.73
GLUCOSE BLDC GLUCOMTR-MCNC: 243 MG/DL (ref 70–130)
GLUCOSE BLDC GLUCOMTR-MCNC: 261 MG/DL (ref 70–130)
GLUCOSE BLDC GLUCOMTR-MCNC: 276 MG/DL (ref 70–130)
GLUCOSE SERPL-MCNC: 251 MG/DL (ref 65–99)
GRAM STN SPEC: ABNORMAL
HBA1C MFR BLD: 9.6 % (ref 4.8–5.6)
HCT VFR BLD AUTO: 30.6 % (ref 34–46.6)
HGB BLD-MCNC: 9.5 G/DL (ref 12–15.9)
IMM GRANULOCYTES # BLD AUTO: 0.1 10*3/MM3 (ref 0–0.05)
IMM GRANULOCYTES NFR BLD AUTO: 0.7 % (ref 0–0.5)
ISOLATED FROM: ABNORMAL
LYMPHOCYTES # BLD AUTO: 1.23 10*3/MM3 (ref 0.7–3.1)
LYMPHOCYTES NFR BLD AUTO: 8.6 % (ref 19.6–45.3)
MCH RBC QN AUTO: 27.1 PG (ref 26.6–33)
MCHC RBC AUTO-ENTMCNC: 31 G/DL (ref 31.5–35.7)
MCV RBC AUTO: 87.2 FL (ref 79–97)
MONOCYTES # BLD AUTO: 1.01 10*3/MM3 (ref 0.1–0.9)
MONOCYTES NFR BLD AUTO: 7.1 % (ref 5–12)
NEUTROPHILS NFR BLD AUTO: 11.85 10*3/MM3 (ref 1.7–7)
NEUTROPHILS NFR BLD AUTO: 82.9 % (ref 42.7–76)
NRBC BLD AUTO-RTO: 0 /100 WBC (ref 0–0.2)
PLATELET # BLD AUTO: 282 10*3/MM3 (ref 140–450)
PMV BLD AUTO: 10.5 FL (ref 6–12)
POTASSIUM SERPL-SCNC: 4.4 MMOL/L (ref 3.5–5.2)
RBC # BLD AUTO: 3.51 10*6/MM3 (ref 3.77–5.28)
SODIUM SERPL-SCNC: 139 MMOL/L (ref 136–145)
WBC # BLD AUTO: 14.29 10*3/MM3 (ref 3.4–10.8)

## 2020-07-13 PROCEDURE — 94799 UNLISTED PULMONARY SVC/PX: CPT

## 2020-07-13 PROCEDURE — 83036 HEMOGLOBIN GLYCOSYLATED A1C: CPT | Performed by: NURSE PRACTITIONER

## 2020-07-13 PROCEDURE — 80048 BASIC METABOLIC PNL TOTAL CA: CPT | Performed by: FAMILY MEDICINE

## 2020-07-13 PROCEDURE — 63710000001 PREDNISONE PER 1 MG: Performed by: NURSE PRACTITIONER

## 2020-07-13 PROCEDURE — 97116 GAIT TRAINING THERAPY: CPT

## 2020-07-13 PROCEDURE — 82962 GLUCOSE BLOOD TEST: CPT

## 2020-07-13 PROCEDURE — 97110 THERAPEUTIC EXERCISES: CPT

## 2020-07-13 PROCEDURE — 94664 DEMO&/EVAL PT USE INHALER: CPT

## 2020-07-13 PROCEDURE — 63710000001 INSULIN LISPRO (HUMAN) PER 5 UNITS: Performed by: NURSE PRACTITIONER

## 2020-07-13 PROCEDURE — 97161 PT EVAL LOW COMPLEX 20 MIN: CPT | Performed by: PHYSICAL THERAPIST

## 2020-07-13 PROCEDURE — 25010000002 CEFEPIME PER 500 MG: Performed by: NURSE PRACTITIONER

## 2020-07-13 PROCEDURE — 99406 BEHAV CHNG SMOKING 3-10 MIN: CPT

## 2020-07-13 PROCEDURE — 85025 COMPLETE CBC W/AUTO DIFF WBC: CPT | Performed by: FAMILY MEDICINE

## 2020-07-13 RX ORDER — CEFDINIR 300 MG/1
300 CAPSULE ORAL EVERY 12 HOURS SCHEDULED
Status: DISCONTINUED | OUTPATIENT
Start: 2020-07-13 | End: 2020-07-15 | Stop reason: HOSPADM

## 2020-07-13 RX ORDER — PREDNISONE 10 MG/1
10 TABLET ORAL
Status: DISCONTINUED | OUTPATIENT
Start: 2020-07-14 | End: 2020-07-15 | Stop reason: HOSPADM

## 2020-07-13 RX ADMIN — Medication 5.25 MG: at 21:26

## 2020-07-13 RX ADMIN — ARFORMOTEROL TARTRATE 15 MCG: 15 SOLUTION RESPIRATORY (INHALATION) at 21:19

## 2020-07-13 RX ADMIN — INSULIN LISPRO 8 UNITS: 100 INJECTION, SOLUTION INTRAVENOUS; SUBCUTANEOUS at 08:41

## 2020-07-13 RX ADMIN — IPRATROPIUM BROMIDE AND ALBUTEROL SULFATE 3 ML: 2.5; .5 SOLUTION RESPIRATORY (INHALATION) at 06:31

## 2020-07-13 RX ADMIN — INSULIN LISPRO 8 UNITS: 100 INJECTION, SOLUTION INTRAVENOUS; SUBCUTANEOUS at 17:31

## 2020-07-13 RX ADMIN — IPRATROPIUM BROMIDE AND ALBUTEROL SULFATE 3 ML: 2.5; .5 SOLUTION RESPIRATORY (INHALATION) at 21:19

## 2020-07-13 RX ADMIN — PREDNISONE 20 MG: 20 TABLET ORAL at 08:42

## 2020-07-13 RX ADMIN — FERROUS SULFATE TAB 325 MG (65 MG ELEMENTAL FE) 325 MG: 325 (65 FE) TAB at 08:42

## 2020-07-13 RX ADMIN — IPRATROPIUM BROMIDE AND ALBUTEROL SULFATE 3 ML: 2.5; .5 SOLUTION RESPIRATORY (INHALATION) at 10:43

## 2020-07-13 RX ADMIN — ARFORMOTEROL TARTRATE 15 MCG: 15 SOLUTION RESPIRATORY (INHALATION) at 06:38

## 2020-07-13 RX ADMIN — INSULIN LISPRO 12 UNITS: 100 INJECTION, SOLUTION INTRAVENOUS; SUBCUTANEOUS at 13:10

## 2020-07-13 RX ADMIN — PANTOPRAZOLE SODIUM 40 MG: 40 TABLET, DELAYED RELEASE ORAL at 06:09

## 2020-07-13 RX ADMIN — MONTELUKAST SODIUM 10 MG: 10 TABLET, FILM COATED ORAL at 20:13

## 2020-07-13 RX ADMIN — CLOPIDOGREL 75 MG: 75 TABLET, FILM COATED ORAL at 08:42

## 2020-07-13 RX ADMIN — IPRATROPIUM BROMIDE AND ALBUTEROL SULFATE 3 ML: 2.5; .5 SOLUTION RESPIRATORY (INHALATION) at 15:00

## 2020-07-13 RX ADMIN — GUAIFENESIN 1200 MG: 600 TABLET, EXTENDED RELEASE ORAL at 20:14

## 2020-07-13 RX ADMIN — SODIUM CHLORIDE, PRESERVATIVE FREE 10 ML: 5 INJECTION INTRAVENOUS at 20:14

## 2020-07-13 RX ADMIN — DILTIAZEM HYDROCHLORIDE 120 MG: 120 CAPSULE, COATED, EXTENDED RELEASE ORAL at 08:42

## 2020-07-13 RX ADMIN — GUAIFENESIN 1200 MG: 600 TABLET, EXTENDED RELEASE ORAL at 08:42

## 2020-07-13 RX ADMIN — CEFEPIME HYDROCHLORIDE 2 G: 2 INJECTION, POWDER, FOR SOLUTION INTRAVENOUS at 08:42

## 2020-07-13 RX ADMIN — BUDESONIDE 0.5 MG: 0.5 INHALANT RESPIRATORY (INHALATION) at 21:19

## 2020-07-13 RX ADMIN — Medication 5.25 MG: at 02:06

## 2020-07-13 RX ADMIN — METFORMIN HYDROCHLORIDE 500 MG: 500 TABLET ORAL at 17:31

## 2020-07-13 RX ADMIN — ESCITALOPRAM 10 MG: 10 TABLET, FILM COATED ORAL at 08:42

## 2020-07-13 RX ADMIN — SODIUM CHLORIDE, PRESERVATIVE FREE 10 ML: 5 INJECTION INTRAVENOUS at 08:42

## 2020-07-13 RX ADMIN — ATORVASTATIN CALCIUM 80 MG: 40 TABLET, FILM COATED ORAL at 08:42

## 2020-07-13 RX ADMIN — CEFDINIR 300 MG: 300 CAPSULE ORAL at 20:22

## 2020-07-13 RX ADMIN — BUDESONIDE 0.5 MG: 0.5 INHALANT RESPIRATORY (INHALATION) at 06:35

## 2020-07-13 NOTE — THERAPY TREATMENT NOTE
Acute Care - Occupational Therapy Treatment Note  [facility]     Patient Name: Jessica Gregg  : 1945  MRN: [id number]  Today's Date: 2020  Onset of Illness/Injury or Date of Surgery: 07/10/20  Date of Referral to OT: 20  Referring Physician: Dr. Parker    Admit Date: 7/10/2020       ICD-10-CM ICD-9-CM   1. Pneumonia due to infectious organism, unspecified laterality, unspecified part of lung J18.9 486   2. Gastrointestinal hemorrhage, unspecified gastrointestinal hemorrhage type K92.2 578.9   3. Decreased activities of daily living (ADL) Z78.9 V49.89   4. Impaired mobility Z74.09 799.89     Patient Active Problem List   Diagnosis   • Chronic obstructive pulmonary disease with acute exacerbation (CMS/HCC)   • CHF (congestive heart failure) (CMS/[facility])   • Type 2 diabetes mellitus, without long-term current use of insulin (CMS/[facility])   • CAD (coronary artery disease)   • COPD exacerbation (CMS/[facility])   • Acute on chronic respiratory failure with hypoxia and hypercapnia (CMS/[facility])   • Pneumonia due to infectious organism   • Acute blood loss anemia   • Iron deficiency anemia     Past Medical History:   Diagnosis Date   • Asthma    • CHF (congestive heart failure) (CMS/HCC)    • COPD (chronic obstructive pulmonary disease) (CMS/[facility])    • Diabetes mellitus (CMS/HCC)    • Emphysema lung (CMS/[facility])    • Hyperlipidemia    • Hypertension    • Mediastinal lymphadenopathy    • Stroke (CMS/HCC)      Past Surgical History:   Procedure Laterality Date   • CAROTID STENT     • CHOLECYSTECTOMY     • COLONOSCOPY     • ESOPHAGUS SURGERY     • SIGMOIDOSCOPY         Therapy Treatment    Rehabilitation Treatment Summary     Row Name 20 1445 20 1303          Treatment Time/Intention    Discipline  physical therapy assistant  -BERNIE  occupational therapy assistant  -     Document Type  therapy note (daily note)  -BERNIE  therapy note (daily note)  -CJ     Subjective Information  no complaints  -JP2  complains  of;weakness;fatigue  -CJ     Mode of Treatment  --  occupational therapy  -CJ     Patient Effort  --  adequate  -CJ     Comment  on 5 LPM, wears Ox on 4lpm at home.  -JP2  --     Existing Precautions/Restrictions  fall;oxygen therapy device and L/min  -BERNIE  fall;oxygen therapy device and L/min  -CJ     Recorded by [BERNIE] Tatiana Raya, PTA 07/13/20 1447  [JP2] Tatiana Raya, PTA 07/13/20 1505 [CJ] Gilberto Bensno COTA/L 07/13/20 1518     Row Name 07/13/20 1445             Vital Signs    Pre SpO2 (%)  96  -BERNIE      O2 Delivery Pre Treatment  supplemental O2 5lpm  -BERNIE      Post SpO2 (%)  90  -BERNIE      O2 Delivery Post Treatment  supplemental O2  -BERNIE      Recorded by [BERNIE] Tatiana Raya, PTA 07/13/20 1505      Row Name 07/13/20 1445 07/13/20 1303          Bed Mobility Assessment/Treatment    Supine-Sit [place] (Bed Mobility)  supervision  -BERNIE  --     Sit-Supine [place] (Bed Mobility)  contact guard  -JP2  --     Comment (Bed Mobility)  --  fowlers in bed!  -CJ     Recorded by [BERNIE] Tatiana Raya, PTA 07/13/20 1505  [JP2] Tatiana Raya, PTA 07/13/20 1510 [CJ] Gilberto Benson COTA/L 07/13/20 1518     Row Name 07/13/20 1445             Sit-Stand Transfer    Sit-Stand [place] (Transfers)  stand by assist  -BERNIE      Recorded by [BERNIE] Tatiana Raya, PTA 07/13/20 1505      Row Name 07/13/20 1445             Stand-Sit Transfer    Stand-Sit [place] (Transfers)  stand by assist  -BERNIE      Recorded by [BERNIE] Tatiana Raya, PTA 07/13/20 1505      Row Name 07/13/20 1445             Toilet Transfer    Type (Toilet Transfer)  sit-stand;stand-sit  -BERNIE      [place] Level (Toilet Transfer)  stand by assist  -BERNIE      Assistive Device (Toilet Transfer)  commode, 3-in-1  -BERNIE      Recorded by [BERNIE] Tatiana Raya, PTA 07/13/20 1505      Row Name 07/13/20 1445             Gait/Stairs Assessment/Training    [place] Level (Gait)  stand by assist  -BERNIE      Assistive Device (Gait)  walker,  front-wheeled  -BERNIE      Distance in Feet (Gait)  120 1 short standing rest  -JP2      Deviations/Abnormal Patterns (Gait)  gait speed decreased;stride length decreased  -JP2      Recorded by [BERNIE] Tatiana Raya, PTA 07/13/20 1505  [JP2] Tatiana Raya, PTA 07/13/20 1510      Row Name 07/13/20 1303             Motor Skills Assessment/Interventions    Additional Documentation  Therapeutic Exercise (Group)  -CJ      Recorded by [CJ] Gilberto Benson COTA/L 07/13/20 1518      Row Name 07/13/20 1445 07/13/20 1303          Therapeutic Exercise    Upper Extremity (Therapeutic Exercise)  --  bicep curl, bilateral;wand exercises  -     Upper Extremity Range of Motion (Therapeutic Exercise)  --  shoulder flexion/extension, bilateral;elbow flexion/extension, bilateral;wrist flexion/extension, bilateral  -     Lower Extremity Range of Motion (Therapeutic Exercise)  hip flexion/extension, bilateral;hip abduction/adduction, bilateral;knee flexion/extension, bilateral;ankle dorsiflexion/plantar flexion, bilateral  -BERNIE  --     Weight/Resistance (Therapeutic Exercise)  --  2 pounds;3 pounds  -     Exercise Type (Therapeutic Exercise)  AROM (active range of motion)  -BERNIE  AROM (active range of motion)  -     Position (Therapeutic Exercise)  seated  -BERNIE  seated  -CJ     Sets/Reps (Therapeutic Exercise)  15-20  -BERNIE  2 sets x 15 reps!  -     Equipment (Therapeutic Exercise)  --  dowel blu/wand;free weight, barbell  -     Expected Outcome (Therapeutic Exercise)  --  facilitate normal movement patterns;improve functional stability;improve functional tolerance, self-care activity;improve motor control;improve neuromuscular control;improve performance, BADLs;improve performance, fine motor coordination skills;improve performance, gait skills;improve performance, transfer skills;improve postural control;increase active range of motion  -     Comment (Therapeutic Exercise)  multiple rests  -EBRNIE  --     Recorded by [BERNIE]  Tatiana Raya, PTA 07/13/20 1510 [CJ] Gilberto Benson, RILEY/L 07/13/20 1518     Row Name 07/13/20 1445 07/13/20 1303          Positioning and Restraints    Pre-Treatment Position  in bed  -BERNIE  in bed  -CJ     Post Treatment Position  bed  -BERNIE  bed  -CJ     In Bed  fowlers;sitting EOB  -BERNIE  fowlers;call light within reach;encouraged to call for assist;side rails up x2  -CJ     Recorded by [BERNIE] Tatiana Raya, PTA 07/13/20 1510 [CJ] Gilberto Benson, RILEY/L 07/13/20 1518     Row Name 07/13/20 1303             Pain Assessment    Additional Documentation  Pain Scale 2: Word Pre/Post-Treatment (Group)  -CJ      Recorded by [CJ] Gilberto Benson COTA/L 07/13/20 1518      Row Name 07/13/20 1445 07/13/20 1303          Pain Scale: Numbers Pre/Post-Treatment    Pain Scale: Numbers, Pretreatment  --  0/10 - no pain  -CJ     Pain Scale: Numbers, Post-Treatment  0/10 - no pain  -BERNIE  0/10 - no pain  -CJ     Pain Intervention(s)  --  Rest  -CJ     Recorded by [BERNIE] Tatiana Raya, PTA 07/13/20 1510 [CJ] Gilberto Benson, RILEY/L 07/13/20 1518     Row Name 07/13/20 1303             Outcome Summary/Treatment Plan (OT)    Daily Summary of Progress (OT)  progress towards functional goals is fair  -CJ      Barriers to Overall Progress (OT)  fall/o2!  -CJ      Plan for Continued Treatment (OT)  continue with ot poc!  -CJ      Recorded by [CJ] Gilberto Benson RILEY/L 07/13/20 1518        User Key  (r) = Recorded By, (t) = Taken By, (c) = Cosigned By    Initials Name Effective Dates Discipline    CJ Gilberto Benson RILEY/L 08/02/16 -  OT    BERNIE Tatiana Raya, [facility] 08/02/16 -  PT           Rehab Goal Summary     Row Name 07/13/20 1015             Bed Mobility Goal 1 (PT)    Activity/Assistive Device (Bed Mobility Goal 1, PT)  supine to sit;sit to supine  -SB      [place] Level/Cues Needed (Bed Mobility Goal 1, PT)  independent  -SB      Time Frame (Bed Mobility Goal 1, PT)  long term goal (LTG)  -SB       Progress/Outcomes (Bed Mobility Goal 1, PT)  goal ongoing  -SB         Transfer Goal 1 (PT)    Activity/Assistive Device (Transfer Goal 1, PT)  sit-to-stand/stand-to-sit;bed-to-chair/chair-to-bed;walker, rolling  -SB      [place] Level/Cues Needed (Transfer Goal 1, PT)  conditional independence  -SB      Time Frame (Transfer Goal 1, PT)  long term goal (LTG)  -SB      Progress/Outcome (Transfer Goal 1, PT)  goal ongoing  -SB         Gait Training Goal 1 (PT)    Activity/Assistive Device (Gait Training Goal 1, PT)  gait (walking locomotion);improve balance and speed;increase endurance/gait distance;increase energy conservation;decrease fall risk;walker, rolling  -SB      [place] Level (Gait Training Goal 1, PT)  supervision required  -SB      Distance (Gait Goal 1, PT)  100  -SB      Time Frame (Gait Training Goal 1, PT)  long term goal (LTG)  -SB      Progress/Outcome (Gait Training Goal 1, PT)  goal ongoing  -SB         Stairs Goal 1 (PT)    Activity/Assistive Device (Stairs Goal 1, PT)  stairs, all skills  -SB      [place] Level/Cues Needed (Stairs Goal 1, PT)  contact guard assist  -SB      Number of Stairs (Stairs Goal 1, PT)  1  -SB      Time Frame (Stairs Goal 1, PT)  long term goal (LTG)  -SB      Progress/Outcome (Stairs Goal 1, PT)  goal ongoing  -SB        User Key  (r) = Recorded By, (t) = Taken By, (c) = Cosigned By    Initials Name Provider Type Discipline    Pepper Santa, PT DPT Physical Therapist PT        Occupational Therapy Education                 Title: PT OT SLP Therapies (In Progress)     Topic: Occupational Therapy (Done)     Point: ADL training (Done)     Description:   Instruct learner(s) on proper safety adaptation and remediation techniques during self care or transfers.   Instruct in proper use of assistive devices.              Learning Progress Summary           Patient Acceptance, E, VU by SANDRA at 7/12/2020 1382                   Point: Home exercise program (Done)      Description:   Instruct learner(s) on appropriate technique for monitoring, assisting and/or progressing therapeutic exercises/activities.              Learning Progress Summary           Patient Acceptance, E,TB, VU,DU,NR by  at 7/13/2020 1518    Comment:  Pt. performs ue exs to increase her act. russell!    Acceptance, E, VU by  at 7/12/2020 1509                   Point: Precautions (Done)     Description:   Instruct learner(s) on prescribed precautions during self-care and functional transfers.              Learning Progress Summary           Patient Acceptance, E,TB, VU,DU,NR by  at 7/13/2020 1518    Comment:  Pt. performs ue exs to increase her act. russell!    Acceptance, E, VU by HELENE at 7/12/2020 1509                   Point: Body mechanics (Done)     Description:   Instruct learner(s) on proper positioning and spine alignment during self-care, functional mobility activities and/or exercises.              Learning Progress Summary           Patient Acceptance, E,TB, VU,DU,NR by  at 7/13/2020 1518    Comment:  Pt. performs ue exs to increase her act. russell!    Acceptance, E, VU by  at 7/12/2020 1509                               User Key     Initials Effective Dates Name Provider Type Discipline     08/02/16 -  Gilberto Benson COTA/L Occupational Therapy Assistant OT     07/05/20 -  Magdalena Winkler OTR/L Occupational Therapist OT                OT Recommendation and Plan  Outcome Summary/Treatment Plan (OT)  Daily Summary of Progress (OT): progress towards functional goals is fair  Barriers to Overall Progress (OT): fall/o2!  Plan for Continued Treatment (OT): continue with ot poc!  Daily Summary of Progress (OT): progress towards functional goals is fair  Plan of Care Review  Plan of Care Reviewed With: patient  Plan of Care Reviewed With: patient  Outcome Measures     Row Name 07/13/20 1303 07/12/20 1500          How much help from another is currently needed...    Putting on and taking off  regular lower body clothing?  4  -  4  -JJ     Bathing (including washing, rinsing, and drying)  3  -  3  -JJ     Toileting (which includes using toilet bed pan or urinal)  3  -  3  -JJ     Putting on and taking off regular upper body clothing  4  -  4  -JJ     Taking care of personal grooming (such as brushing teeth)  4  -  4  -JJ     Eating meals  4  -  4  -JJ     AM-PAC 6 Clicks Score (OT)  22  -  22  -        Functional Assessment    Outcome Measure Options  AM-PAC 6 Clicks Daily Activity (OT)  -  AM-PAC 6 Clicks Daily Activity (OT)  -       User Key  (r) = Recorded By, (t) = Taken By, (c) = Cosigned By    Initials Name Provider Type     Gilberto Benson COTA/L Occupational Therapy Assistant    Magdalena Mann OTR/L Occupational Therapist           Time Calculation:   Time Calculation- OT     Row Name 07/13/20 1303             Time Calculation-     OT Start Time  1303  -      OT Stop Time  1330  -      OT Time Calculation (min)  27 min  -      Total Timed Code Minutes- OT  27 minute(s)  -      TCU Minutes- OT  27 min  -      OT Received On  07/13/20  -      OT Goal Re-Cert Due Date  07/22/20  -        User Key  (r) = Recorded By, (t) = Taken By, (c) = Cosigned By    Initials Name Provider Type     Gilberto Benson COTA/L Occupational Therapy Assistant        Therapy Charges for Today     Code Description Service Date Service Provider Modifiers Qty    [id number] HC OT THER PROC EA 15 MIN 7/13/2020 Gilberto Benson COTA/L GO 2               MARILIN Pandey  7/13/2020

## 2020-07-13 NOTE — THERAPY EVALUATION
Patient Name: Jessica Gregg  : 1945    MRN: [id number]                              Today's Date: 2020       Admit Date: 7/10/2020    Visit Dx:     ICD-10-CM ICD-9-CM   1. Pneumonia due to infectious organism, unspecified laterality, unspecified part of lung J18.9 486   2. Gastrointestinal hemorrhage, unspecified gastrointestinal hemorrhage type K92.2 578.9   3. Decreased activities of daily living (ADL) Z78.9 V49.89   4. Impaired mobility Z74.09 799.89     Patient Active Problem List   Diagnosis   • Chronic obstructive pulmonary disease with acute exacerbation (CMS/[facility])   • CHF (congestive heart failure) (CMS/formerly [facility])   • Type 2 diabetes mellitus, without long-term current use of insulin (CMS/formerly [facility])   • CAD (coronary artery disease)   • COPD exacerbation (CMS/formerly [facility])   • Acute on chronic respiratory failure with hypoxia and hypercapnia (CMS/formerly [facility])   • Pneumonia due to infectious organism   • Acute blood loss anemia   • Iron deficiency anemia     Past Medical History:   Diagnosis Date   • Asthma    • CHF (congestive heart failure) (CMS/formerly [facility])    • COPD (chronic obstructive pulmonary disease) (CMS/formerly [facility])    • Diabetes mellitus (CMS/[facility])    • Emphysema lung (CMS/formerly [facility])    • Hyperlipidemia    • Hypertension    • Mediastinal lymphadenopathy    • Stroke (CMS/formerly [facility])      Past Surgical History:   Procedure Laterality Date   • CAROTID STENT     • CHOLECYSTECTOMY     • COLONOSCOPY     • ESOPHAGUS SURGERY     • SIGMOIDOSCOPY       General Information     Row Name 20 1015          PT Evaluation Time/Intention    Document Type  evaluation Pt presented with c/o decreased O2 saturation, SOB, and cough. Pt has a hx of COPD/emphysema, CHF, DM, HTN, CRT on O2 and CPAP. Dx: Gi hemorrhage, pneumonia, anemia  -SB     Mode of Treatment  physical therapy  -SB     Row Name 20 1015          General Information    Patient Profile Reviewed?  yes  -SB     Prior Level of Function  independent:;all household mobility;ADL's  -SB      Existing Precautions/Restrictions  fall;oxygen therapy device and L/min 4L at home; 5L currently  -SB     Barriers to Rehab  medically complex  -SB     Row Name 07/13/20 1015          Relationship/Environment    Lives With  sibling(s)  -SB     Row Name 07/13/20 1015          Resource/Environmental Concerns    Current Living Arrangements  home/apartment/condo step over tub, walk in shower, rollator, home O2, shower chair, BSC, grab bars  -SB     Row Name 07/13/20 1015          Home Main Entrance    Number of Stairs, Main Entrance  one  -SB     Stair Railings, Main Entrance  none  -SB     Row Name 07/13/20 1015          Stairs Within Home, Primary    Number of Stairs, Within Home, Primary  none  -SB     Row Name 07/13/20 1015          Cognitive Assessment/Intervention- PT/OT    Orientation Status (Cognition)  oriented x 4  -SB     Personal Safety Interventions  gait belt;supervised activity;muscle strengthening facilitated;nonskid shoes/slippers when out of bed;fall prevention program maintained  -SB     Row Name 07/13/20 1015          Safety Issues, Functional Mobility    Impairments Affecting Function (Mobility)  balance;endurance/activity tolerance;shortness of breath;strength  -SB       User Key  (r) = Recorded By, (t) = Taken By, (c) = Cosigned By    Initials Name Provider Type    SB Pepper Zacarias, PT DPT Physical Therapist        Mobility     Row Name 07/13/20 1015          Bed Mobility Assessment/Treatment    Bed Mobility Assessment/Treatment  supine-sit  -SB     Supine-Sit [place] (Bed Mobility)  supervision  -SB     Assistive Device (Bed Mobility)  head of bed elevated;bed rails  -SB     Comment (Bed Mobility)  left sitting EOB with RT  -SB     Row Name 07/13/20 1015          Sit-Stand Transfer    Sit-Stand [place] (Transfers)  contact guard  -SB     Assistive Device (Sit-Stand Transfers)  walker, front-wheeled  -SB     Row Name 07/13/20 1015          Gait/Stairs Assessment/Training    Gait/Stairs  Assessment/Training  gait/ambulation independence  -SB     [place] Level (Gait)  contact guard;verbal cues  -SB     Assistive Device (Gait)  walker, front-wheeled  -SB     Distance in Feet (Gait)  50x2 with standing rest break  -SB     Pattern (Gait)  step-through  -SB     Deviations/Abnormal Patterns (Gait)  gait speed decreased;[place] decreased  -SB     Bilateral Gait Deviations  forward flexed posture  -SB     Comment (Gait/Stairs)  reqd standing rest break due to SOA and leg weakness  -SB       User Key  (r) = Recorded By, (t) = Taken By, (c) = Cosigned By    Initials Name Provider Type    SB Pepper Zacarias, PT DPT Physical Therapist        Obj/Interventions     Row Name 07/13/20 1015          General ROM    GENERAL ROM COMMENTS  BLE WFL  -SB     Row Name 07/13/20 1015          MMT (Manual Muscle Testing)    General MMT Comments  BLE 4/5  -SB     Row Name 07/13/20 1015          Static Sitting Balance    Level of [place] (Unsupported Sitting, Static Balance)  supervision  -SB     Sitting Position (Unsupported Sitting, Static Balance)  sitting on edge of bed  -SB     Row Name 07/13/20 1015          Dynamic Sitting Balance    Level of [place], Reaches Outside Midline (Sitting, Dynamic Balance)  standby assist  -SB     Sitting Position, Reaches Outside Midline (Sitting, Dynamic Balance)  other (see comments) sitting on toilet  -SB     Row Name 07/13/20 1015          Static Standing Balance    Level of [place] (Supported Standing, Static Balance)  contact guard assist  -SB     Assistive Device Utilized (Supported Standing, Static Balance)  walker, rolling  -SB     Row Name 07/13/20 1015          Sensory Assessment/Intervention    Sensory General Assessment  no sensation deficits identified  -SB       User Key  (r) = Recorded By, (t) = Taken By, (c) = Cosigned By    Initials Name Provider Type    SB Pepper Zacarias, ADAM DPT Physical Therapist        Goals/Plan     Row Name 07/13/20 1015          Bed  Mobility Goal 1 (PT)    Activity/Assistive Device (Bed Mobility Goal 1, PT)  supine to sit;sit to supine  -SB     [place] Level/Cues Needed (Bed Mobility Goal 1, PT)  independent  -SB     Time Frame (Bed Mobility Goal 1, PT)  long term goal (LTG)  -SB     Progress/Outcomes (Bed Mobility Goal 1, PT)  goal ongoing  -SB     Row Name 07/13/20 1015          Transfer Goal 1 (PT)    Activity/Assistive Device (Transfer Goal 1, PT)  sit-to-stand/stand-to-sit;bed-to-chair/chair-to-bed;walker, rolling  -SB     [place] Level/Cues Needed (Transfer Goal 1, PT)  conditional independence  -SB     Time Frame (Transfer Goal 1, PT)  long term goal (LTG)  -SB     Progress/Outcome (Transfer Goal 1, PT)  goal ongoing  -SB     Row Name 07/13/20 1015          Gait Training Goal 1 (PT)    Activity/Assistive Device (Gait Training Goal 1, PT)  gait (walking locomotion);improve balance and speed;increase endurance/gait distance;increase energy conservation;decrease fall risk;walker, rolling  -SB     [place] Level (Gait Training Goal 1, PT)  supervision required  -SB     Distance (Gait Goal 1, PT)  100  -SB     Time Frame (Gait Training Goal 1, PT)  long term goal (LTG)  -SB     Progress/Outcome (Gait Training Goal 1, PT)  goal ongoing  -SB     Row Name 07/13/20 1015          Stairs Goal 1 (PT)    Activity/Assistive Device (Stairs Goal 1, PT)  stairs, all skills  -SB     [place] Level/Cues Needed (Stairs Goal 1, PT)  contact guard assist  -SB     Number of Stairs (Stairs Goal 1, PT)  1  -SB     Time Frame (Stairs Goal 1, PT)  long term goal (LTG)  -SB     Progress/Outcome (Stairs Goal 1, PT)  goal ongoing  -SB       User Key  (r) = Recorded By, (t) = Taken By, (c) = Cosigned By    Initials Name Provider Type    SB Pepper Zacarias, PT DPT Physical Therapist        Clinical Impression     Row Name 07/13/20 1023          Pain Assessment    Additional Documentation  Pain Scale: Numbers Pre/Post-Treatment (Group)  -SB     Row Name  07/13/20 1023          Pain Scale: Numbers Pre/Post-Treatment    Pain Scale: Numbers, Pretreatment  0/10 - no pain  -SB     Pre/Post Treatment Pain Comment  c/o being tired  -SB     Pain Intervention(s)  Medication (See MAR);Ambulation/increased activity;Repositioned  -SB     Row Name 07/13/20 1023          Plan of Care Review    Plan of Care Reviewed With  patient  -SB     Progress  no change  -SB     Outcome Summary  PT eval completed. Pt alert and oriented x4 with c/o of being tired. Pt performs bed mob with sup and HOB elevated/HR. Pt with good strength in BLE and O2 sats in 90s during EOB activity. Pt performed t/f and gait with CGA and RW, ambulating 40 feet x2 with standing rest break due to SOA and BLE weakness. Pt O2 sats 91% upon return to sitting EOB. Pt will benefit from skilled PT to improve endurance, gait training and energy conservation. Anticipate d/c home with assist and HH.   -SB     Row Name 07/13/20 1023          Physical Therapy Clinical Impression    Patient/Family Goals Statement (PT Clinical Impression)  go home to sisters  -SB     Criteria for Skilled Interventions Met (PT Clinical Impression)  yes;treatment indicated  -SB     Rehab Potential (PT Clinical Summary)  good, to achieve stated therapy goals  -SB     Predicted Duration of Therapy (PT)  until d/c  -SB     Row Name 07/13/20 1023          Vital Signs    Pre SpO2 (%)  96  -SB     O2 Delivery Pre Treatment  supplemental O2 5L  -SB     O2 Delivery Intra Treatment  supplemental O2  -SB     Post SpO2 (%)  91  -SB     O2 Delivery Post Treatment  supplemental O2  -SB     Row Name 07/13/20 1023          Positioning and Restraints    Pre-Treatment Position  in bed  -SB     Post Treatment Position  bed  -SB     In Bed  sitting EOB;with other staff;encouraged to call for assist;call light within reach;side rails up x2  -SB       User Key  (r) = Recorded By, (t) = Taken By, (c) = Cosigned By    Initials Name Provider Type    Pepper Santa  PT DPT Physical Therapist        Outcome Measures     Row Name 07/13/20 1015          How much help from another person do you currently need...    Turning from your back to your side while in flat bed without using bedrails?  4  -SB     Moving from lying on back to sitting on the side of a flat bed without bedrails?  3  -SB     Moving to and from a bed to a chair (including a wheelchair)?  3  -SB     Standing up from a chair using your arms (e.g., wheelchair, bedside chair)?  3  -SB     Climbing 3-5 steps with a railing?  3  -SB     To walk in hospital room?  3  -SB     AM-PAC 6 Clicks Score (PT)  19  -SB     Row Name 07/13/20 1015          Functional Assessment    Outcome Measure Options  AM-PAC 6 Clicks Basic Mobility (PT)  -SB       User Key  (r) = Recorded By, (t) = Taken By, (c) = Cosigned By    Initials Name Provider Type    Pepper Santa, PT DPT Physical Therapist        Physical Therapy Education                 Title: PT OT SLP Therapies (In Progress)     Topic: Physical Therapy (In Progress)     Point: Mobility training (Done)     Description:   Instruct learner(s) on safety and technique for assisting patient out of bed, chair or wheelchair.  Instruct in the proper use of assistive devices, such as walker, crutches, cane or brace.              Patient Friendly Description:   It's important to get you on your feet again, but we need to do so in a way that is safe for you. Falling has serious consequences, and your personal safety is the most important thing of all.        When it's time to get out of bed, one of us or a family member will sit next to you on the bed to give you support.     If your doctor or nurse tells you to use a walker, crutches, a cane, or a brace, be sure you use it every time you get out of bed, even if you think you don't need it.    Learning Progress Summary           Patient Acceptance, E, VU by VERN at 7/13/2020 1102    Comment:  pt edu on pursed lip breathing, POC, benefits  of act, d/c plans                   Point: Home exercise program (Not Started)     Description:   Instruct learner(s) on appropriate technique for monitoring, assisting and/or progressing patient with therapeutic exercises and activities.              Learner Progress:   Not documented in this visit.          Point: Body mechanics (Not Started)     Description:   Instruct learner(s) on proper positioning and spine alignment for patient and/or caregiver during mobility tasks and/or exercises.              Learner Progress:   Not documented in this visit.          Point: Precautions (Done)     Description:   Instruct learner(s) on prescribed precautions during mobility and gait tasks              Learning Progress Summary           Patient Acceptance, E, VU by SB at 7/13/2020 1102    Comment:  pt edu on pursed lip breathing, POC, benefits of act, d/c plans                               User Key     Initials Effective Dates Name Provider Type Discipline    VERN 10/31/19 -  Pepper Zacarias, PT DPT Physical Therapist PT              PT Recommendation and Plan  Planned Therapy Interventions (PT Eval): balance training, bed mobility training, gait training, home exercise program, patient/family education, transfer training, stair training, strengthening  Outcome Summary/Treatment Plan (PT)  Anticipated Discharge Disposition (PT): home with assist, home with home health  Plan of Care Reviewed With: patient  Progress: no change  Outcome Summary: PT eval completed. Pt alert and oriented x4 with c/o of being tired. Pt performs bed mob with sup and HOB elevated/HR. Pt with good strength in BLE and O2 sats in 90s during EOB activity. Pt performed t/f and gait with CGA and RW, ambulating 40 feet x2 with standing rest break due to SOA and BLE weakness. Pt O2 sats 91% upon return to sitting EOB. Pt will benefit from skilled PT to improve endurance, gait training and energy conservation. Anticipate d/c home with assist and HH.       Time Calculation:   PT Charges     Row Name 07/13/20 1103             Time Calculation    Start Time  1015  -SB      Stop Time  1053  -SB      Time Calculation (min)  38 min  -SB      PT Received On  07/13/20  -SB      PT Goal Re-Cert Due Date  07/23/20  -SB        User Key  (r) = Recorded By, (t) = Taken By, (c) = Cosigned By    Initials Name Provider Type    SB Pepper Zacarias, PT DPT Physical Therapist        Therapy Charges for Today     Code Description Service Date Service Provider Modifiers Qty    39162566534 HC PT EVAL LOW COMPLEXITY 3 7/13/2020 Pepper Zacarias, PT DPT GP 1          PT G-Codes  Outcome Measure Options: AM-PAC 6 Clicks Basic Mobility (PT)  AM-PAC 6 Clicks Score (PT): 19  AM-PAC 6 Clicks Score (OT): 22    Pepper Zacarias PT DPT  7/13/2020

## 2020-07-13 NOTE — PROGRESS NOTES
[facility] Medicine Services  INPATIENT PROGRESS NOTE    Length of Stay: 3  Date of Admission: 7/10/2020  Primary Care Physician: Chandan, Sushma, MD    Subjective   Chief Complaint: Follow-up shortness of breath  HPI   Patient resting in bed.  She states she feels tired as she did not sleep well last night.  She denies any new symptoms today.  She states she really is not coughing much at all.  She still has some shortness of breath, but feels close to baseline.  I turned her down to her home setting of 4 L of oxygen while in the room, oxygen level stayed between 94 to 96%.  She denies chest pain.  She is eating and drinking well.    Review of Systems   All pertinent negatives and positives are as above. All other systems have been reviewed and are negative unless otherwise stated.     Objective    Temp:  [97.6 °F (36.4 °C)-98.3 °F (36.8 °C)] 98.1 °F (36.7 °C)  Heart Rate:  [] 87  Resp:  [16-24] 18  BP: (108-153)/(47-71) 153/71  Physical Exam  Constitutional: She is oriented to person, place, and time. She appears well-developed and well-nourished. No distress.   HENT:   Head: Normocephalic and atraumatic.   Neck: Normal range of motion. Neck supple. No JVD present. No tracheal deviation present.   Cardiovascular: Regular rhythm and intact distal pulses. Exam reveals no gallop and no friction rub.   Sinus 84-91  Pulmonary/Chest: She has no wheezes. She has no rales.   Grossly diminished throughout.   Abdominal: Soft. Bowel sounds are normal. She exhibits no distension. There is no tenderness. There is no guarding.   Musculoskeletal: Normal range of motion. She exhibits no edema or tenderness.   Neurological: She is alert and oriented to person, place, and time. No cranial nerve deficit.   Skin: Skin is warm and dry. No rash noted. No erythema.   Psychiatric: She has a normal mood and affect. Her behavior is normal. Judgment and thought content normal.    Vitals reviewed.     Results Review:  I have reviewed the labs, radiology results, and diagnostic studies.    Laboratory Data:   Results from last 7 days   Lab Units 07/13/20  0234 07/12/20  0209 07/11/20  0526   WBC 10*3/mm3 14.29* 8.90 11.73*   HEMOGLOBIN g/dL 9.5* 10.8* 10.0*   HEMATOCRIT % 30.6* 34.8 32.3*   PLATELETS 10*3/mm3 282 283 269     Results from last 7 days   Lab Units 07/13/20  0234 07/12/20  0209 07/11/20  0526 07/10/20  1744   SODIUM mmol/L 139 136 140 137   POTASSIUM mmol/L 4.4 4.6 4.0 4.3   CHLORIDE mmol/L 98 94* 98 96*   CO2 mmol/L 30.0* 30.0* 32.0* 28.0   BUN mg/dL 18 17 13 16   CREATININE mg/dL 0.60 0.50* 0.68 0.49*   CALCIUM mg/dL 9.4 10.0 9.2 9.5   BILIRUBIN mg/dL  --   --   --  0.2   ALK PHOS U/L  --   --   --  76   ALT (SGPT) U/L  --   --   --  9   AST (SGOT) U/L  --   --   --  14   GLUCOSE mg/dL 251* 314* 238* 289*     I have reviewed the patient current medications.     Assessment/Plan     Active Hospital Problems    Diagnosis   • **Pneumonia due to infectious organism   • Acute blood loss anemia   • Iron deficiency anemia   • Acute on chronic respiratory failure with hypoxia and hypercapnia (CMS/[facility])   • Type 2 diabetes mellitus, without long-term current use of insulin (CMS/[facility])   • CAD (coronary artery disease)   • Chronic obstructive pulmonary disease with acute exacerbation (CMS/[facility])     Plan:  1.  Patient admitted 7/10 with shortness of breath.  She did have an elevated d-dimer level, prompting a CTA of the chest.  This showed infiltrates in the left lung, most significant in the left lower lobe, likely due to pneumonia.  2.  Patient was initially given Rocephin and azithromycin.  However, given her recent hospital admission in May, her antibiotic therapy was changed to vancomycin and cefepime yesterday.  Urinary antigens for strep and Legionella are negative.  MRSA nasal swab was negative.  Respiratory culture shows growth of normal respiratory sander.  1 blood culture positive  for coagulase-negative staph, likely contaminant.  Patient afebrile.  Elevation in white blood cell count today likely reaction from steroid therapy.  3.  Transition cefepime to oral Omnicef today to complete a total of 7 days of therapy.  Vancomycin discontinued yesterday.  4.  Continue Brovana and Pulmicort nebulizers.  Continue duo nebs, Mucinex, and encouraged use of incentive spirometer.  Wean oral prednisone.  5.  Last blood glucoses- 251, 243, 261.  Last hemoglobin A1c was around 1 year ago and was 7.1%.  Will recheck.  Continue sliding scale insulin, resume metformin.  6.  Patient does have worsening anemia compared to last month.  Hemoglobin 12.12 on 6/4, 10.6 on admission, and now 9.5.  Occult blood stool was found to be positive, however patient is not thought to have an acute bleed.  Continue oral Protonix and Plavix.  Patient has never had a colonoscopy.  This could be arranged by Dr. Burns at time of discharge  7.  Continue PT/OT.  Activity as tolerated.  8.  Lab holiday in AM    Discharge Planning: I expect the patient to be discharged to home with home health in 1-2 days.    Leatha Mobley, APRN   07/13/20   12:00

## 2020-07-13 NOTE — PLAN OF CARE
Problem: Patient Care Overview  Goal: Plan of Care Review  Outcome: Ongoing (interventions implemented as appropriate)  Flowsheets (Taken 7/13/2020 7848)  Progress: improving  Plan of Care Reviewed With: patient  Outcome Summary: VSS this shift. IV maxipeme last dose given today. PO omnicef started. Patient still on oral steroids. Blood glucose has been elevated.  Metformin restarted. Patient weaned down to 4L NC. Safety maintained, no falls. Will cont to monitor and notify MD of any changes.

## 2020-07-13 NOTE — PLAN OF CARE
Problem: Patient Care Overview  Goal: Plan of Care Review  Outcome: Ongoing (interventions implemented as appropriate)  Flowsheets (Taken 7/13/2020 1023)  Progress: no change  Plan of Care Reviewed With: patient  Outcome Summary: PT eval completed. Pt alert and oriented x4 with c/o of being tired. Pt performs bed mob with sup and HOB elevated/HR. Pt with good strength in BLE and O2 sats in 90s during EOB activity. Pt performed t/f and gait with CGA and RW, ambulating 40 feet x2 with standing rest break due to SOA and BLE weakness. Pt O2 sats 91% upon return to sitting EOB. Pt will benefit from skilled PT to improve endurance, gait training and energy conservation. Anticipate d/c home with assist and HH.

## 2020-07-13 NOTE — PAYOR COMM NOTE
"7/13/20 [facility] [phone]  [phone]    PATIENT ER ADMISSION TO INPATIENT ON 7/10/20.        Jessica Gregg (74 y.o. Female)     Date of Birth Social Security Number Address Home Phone MRN    1945  [address] [address] [phone] [id number]    Buddhist Marital Status          Roman Catholic        Admission Date Admission Type Admitting Provider Attending Provider Department, Room/Bed    7/10/20 Emergency Aubrey Parker MD Moore, Jonathan Scott, MD [facility] 4B, 444/1    Discharge Date Discharge Disposition Discharge Destination                       Attending Provider:  Aubrey Parker MD    Allergies:  Tramadol, Latex    Isolation:  None   Infection:  None   Code Status:  CPR    Ht:  152.4 cm (60\")   Wt:  56.3 kg (124 lb 3.2 oz)    Admission Cmt:  None   Principal Problem:  Pneumonia due to infectious organism [J18.9]                 Active Insurance as of 7/10/2020     Primary Coverage     Payor Plan Insurance Group Employer/Plan Group    HUMANA MEDICARE REPLACEMENT HUMANA MEDICARE REPLACEMENT [id number]     Payor Plan Address Payor Plan Phone Number Payor Plan Fax Number Effective Dates    [address] [address] [phone]  10/1/2018 - None Entered    [facility] [id number]       Subscriber Name Subscriber Birth Date Member ID       JESSICA GREGG 1945 [id number]                 Emergency Contacts      (Rel.) Home Phone Work Phone Mobile Phone    Mariaa Pitts (Sister) [phone] -- [phone]        Brodie Tate MD   Physician   Medicine   H&P   Addendum   Date of Service:  07/10/20 2259   Creation Time:  07/10/20 2259            Expand All Collapse All      Show:Clear all  [x]Manual[x]Template[x]Copied    Added by:  [x]Brodie Tate MD    []Jarekver for details       [facility] Medicine Services  HISTORY AND PHYSICAL     Date of Admission: " "7/10/2020  Primary Care Physician: Chandan, Sushma, MD     Subjective      Chief Complaint: Shortness of breath     History of Present Illness  74 year old female with PMH of CHF, COPD, DM NID, HTN, CRF on O2 and CPAP at  that presents with complaints of cough and shortness of breath for 2 days. States her oxygen level is lower as well. In the ER she was noted hypoxemic, confirmed with abg, WBC is elevated and she has \"New infiltrate in the left lung base, worrisome for pneumonia\" on CXR.      CBC shows an Hb of 10.6 and stools resulted positive for occult blood. She did not refer symptoms related to this.      Living with her sister for 3 months.     Review of Systems   Otherwise complete ROS reviewed and negative except as mentioned in the HPI.     Past Medical History:   Medical History        Past Medical History:   Diagnosis Date   • Asthma     • CHF (congestive heart failure) (CMS/HCC)     • COPD (chronic obstructive pulmonary disease) (CMS/HCC)     • Diabetes mellitus (CMS/HCC)     • Emphysema lung (CMS/HCC)     • Hyperlipidemia     • Hypertension     • Mediastinal lymphadenopathy     • Stroke (CMS/HCC)           Past Surgical History:  Surgical History         Past Surgical History:   Procedure Laterality Date   • CAROTID STENT       • CHOLECYSTECTOMY       • COLONOSCOPY       • ESOPHAGUS SURGERY       • SIGMOIDOSCOPY                   Vital Signs: /67   Pulse 106   Temp 98.5 °F (36.9 °C) (Oral)   Resp 21   Ht 152.4 cm (60\")   Wt 53.2 kg (117 lb 4.8 oz)   SpO2 96%   BMI 22.91 kg/m²   Physical Exam   Constitutional: She is oriented to person, place, and time. She appears well-developed.   Dyspneic at rest   HENT:   Head: Normocephalic and atraumatic.   Right Ear: External ear normal.   Left Ear: External ear normal.   Eyes: Pupils are equal, round, and reactive to light. EOM are normal. Right eye exhibits no discharge. Left eye exhibits no discharge.   Neck: Normal range of motion. Neck supple. " No JVD present. No thyromegaly present.   Cardiovascular: Normal heart sounds. Exam reveals no friction rub.   No murmur heard.  Sinus tachycardic   Pulmonary/Chest: No stridor. No respiratory distress. She has wheezes. She has rales. She exhibits no tenderness.   Abdominal: Soft. Bowel sounds are normal. She exhibits no distension and no mass. There is no tenderness. There is no guarding.   Musculoskeletal: Normal range of motion. She exhibits no edema or deformity.   Neurological: She is alert and oriented to person, place, and time. She displays normal reflexes. No cranial nerve deficit. She exhibits normal muscle tone. Coordination normal.   Skin: Skin is warm. Capillary refill takes less than 2 seconds. No rash noted. She is not diaphoretic. No erythema.   Psychiatric: She has a normal mood and affect.      hours)      Procedure Component Value Units Date/Time     POC Occult Blood Stool [[id number]]  (Abnormal) Collected:  07/10/20 2011     Specimen:  Stool Updated:  07/10/20 2011       Fecal Occult Blood Positive       Lot Number \169\       Expiration Date \11/30/2020\       DEVELOPER LOT NUMBER \169\       DEVELOPER EXPIRATION DATE \11/30/2020\       Positive Control Positive       Negative Control Negative     Fact sheet for patients: https://www.fda.gov/media/334176/download      Comprehensive Metabolic Panel [[id number]]  (Abnormal) Collected:  07/10/20 1744     Specimen:  Blood Updated:  07/10/20 1813       Glucose 289 mg/dL         BUN 16 mg/dL         Creatinine 0.49 mg/dL         Sodium 137 mmol/L         Potassium 4.3 mmol/L         Chloride 96 mmol/L         CO2 28.0 mmol/L         Calcium 9.5 mg/dL         Total Protein 6.3 g/dL         Albumin 3.40 g/dL         ALT (SGPT) 9 U/L         AST (SGOT) 14 U/L         Alkaline Phosphatase 76 U/L         Total Bilirubin 0.2 mg/dL         eGFR Non African Amer 123 mL/min/1.73         Globulin 2.9 gm/dL         A/G Ratio 1.2 g/dL         BUN/Creatinine Ratio  32.7       Anion Gap 13.0 mmol/L       Narrative:        Please view results for these tests on the individual orders.      CBC Auto Differential [[id number]]  (Abnormal) Collected:  07/10/20 1744     Specimen:  Blood Updated:  07/10/20 1754       WBC 13.85 10*3/mm3         RBC 3.92 10*6/mm3         Hemoglobin 10.6 g/dL         Hematocrit 33.9 %         MCV 86.5 fL         MCH 27.0 pg         MCHC 31.3 g/dL         RDW 15.1 %         RDW-SD 47.8 fl         MPV 9.7 fL         Platelets 292 10*3/mm3         Neutrophil % 79.1 %         Lymphocyte % 8.7 %         Monocyte % 10.0 %         Eosinophil % 0.6 %         Basophil % 0.5 %         Immature Grans % 1.1 %         Neutrophils, Absolute 10.94 10*3/mm3         Lymphocytes, Absolute 1.21 10*3/mm3         Monocytes, Absolute 1.39 10*3/mm3         Eosinophils, Absolute 0.09 10*3/mm3         Basophils, Absolute 0.07 10*3/mm3         Immature Grans, Absolute 0.15 10*3/mm3         nRBC 0.0        /100 WBC          Blood Gas, Arterial [[id number]]  (Abnormal) Collected:  07/10/20 1740     Specimen:  Arterial Blood Updated:  07/10/20 1750       Site Right Brachial       Aneesh's Test N/A       pH, Arterial 7.432 pH units         pCO2, Arterial 48.3 mm Hg         Comment: 83 Value above reference range          pO2, Arterial 46.7 mm Hg         Comment: 85 Value below critical limit          HCO3, Arterial 32.2 mmol/L         Comment: 83 Value above reference range          Base Excess, Arterial 6.9 mmol/L         Comment: 83 Value above reference range          O2 Saturation, Arterial 81.2 %         Comment: 84 Value below reference range          Temperature 37.0 C         Barometric Pressure for Blood Gas 749 mmHg         Modality Nasal Cannula       Flow Rate 4.0 lpm         Ventilator Mode NA       Notified Who DR PASCUAL       Notified By [id number]       Notified Time 07/10/2020 17:57       Collected by [id number]       Comment: Meter: [id number]     :  [id number]             pCO2, Temperature Corrected 48.3 mm Hg         pH, Temp Corrected 7.432 pH Units         pO2, Temperature Corrected 46.7 mm Hg                     Imaging Results (Last 24 Hours)      Procedure Component Value Units Date/Time     CT Angiogram Chest [[id number]] Collected:  07/10/20 2123       Updated:  07/10/20 2130     Narrative:        EXAMINATION:  CT ANGIOGRAM CHEST-  7/10/2020 8:57 PM CDT     HISTORY: Shortness of breath. Elevated d-dimer. Left lower lobe  infiltrate on x-ray.     COMPARISON : No comparison study.     DLP: 250 mGy-cm. Automated dosage control was utilized.        EXAMINATION:  CT ANGIOGRAM CHEST-  7/10/2020 8:57 PM CDT     HISTORY: Shortness of breath. Elevated d-dimer. Left lower lobe  infiltrate on x-ray.     COMPARISON : No comparison study.     DLP: 250 mGy-cm. Automated dosage control was utilized.     TECHNIQUE: CT angio was performed of the chest with contrast. Coronal,  sagittal and 3-D reconstruction were performed.     MEDIASTINUM, HEART AND VASCULAR STRUCTURES: There is atheromatous  disease of the thoracic aorta and coronary arteries. There is no CT  evidence of pulmonary embolus. There are multiple mediastinal lymph  nodes that are borderline in size. The largest has a short axis diameter  of 1.3 cm. The heart is normal in size.     LUNGS: The lungs are emphysematous. There is fairly dense airspace  consolidation in the left lower lobe. There is mild dependent infiltrate  in the right lower lobe likely due to atelectasis. There are small  peripheral infiltrates in the left upper lobe.     UPPER ABDOMEN: No acute findings.     BONES: There are degenerative changes of the spine. No acute bony  abnormality is seen.         Impression:        1. No CT evidence of pulmonary embolus.  2. CT confirms infiltrates in the left lung, most significant in the  left lower lobe. This is likely due to pneumonia. There is mild  dependent atelectasis in the right lung base.  3. Centrilobular  emphysema.  4. Mediastinal lymphadenopathy, likely reactive.     The full report of this exam was immediately signed and available to the  emergency room. The patient is currently in the emergency room.    This report was finalized on 07/10/2020 21:27 by Dr. Pete Jean-Baptiste MD.     XR Chest 1 View [[id number]] Collected:  07/10/20 1842       Updated:  07/10/20 1846     Narrative:        EXAMINATION:  XR CHEST 1 VW-  7/10/2020 6:36 PM CDT     HISTORY: CHF/COPD Protocol     COMPARISON: 05/31/2020.     FINDINGS:  There is patchy infiltrate in the left lung base. The right  lung is essentially clear. Emphysematous changes are noted. Bronchial  wall thickening appears stable. Heart size is normal. Thoracic aorta is  atheromatous. There is a subacute-chronic left humerus fracture.        Impression:        1. New infiltrate in the left lung base, worrisome for pneumonia.  2. Emphysema.        This report was finalized on 07/10/2020 18:42 by Dr. Pete Jean-Baptiste MD.          I have personally reviewed and interpreted the radiology studies and ECG obtained at time of admission.      Assessment / Plan      Assessment:       Active Hospital Problems     Diagnosis   • **Pneumonia due to infectious organism   • Acute blood loss anemia   • CHF (congestive heart failure) (CMS/HCC)   • Type 2 diabetes mellitus, without long-term current use of insulin (CMS/HCC)   • CAD (coronary artery disease)   • Chronic obstructive pulmonary disease with acute exacerbation (CMS/HCC)      Plan:   Admit to medical floor with telemetry  Vitals every 4 hours.   Up with assistance. Cardiac consistent carbohydrate diet.     Continue Rocephin/Zithromax based on 2019 ATS/IDSA Guidelines for Community-Acquired Pneumonia. Patient has no history of MRSA or pseudomona infection or colonization. Follow cultures.     Oxygen by nasal canula to maintain oxygen saturation at 90%.  CPAP at night home settings.     Duoneb QID, albuterol nebulized as  needed     Humalog sliding scale     For anemia > check iron profile, ferritin and reticulocyte count.   Incidental finding for outpatient follow up      COVID negative     Home medications reconciled. Hold Plavix.     Code Status: Full     I discussed the patient's findings and my recommendations with the patient     Estimated length of stay over 2 midnights     Patient seen and examined by me on see below.     Brodie Tate MD   07/10/20   22:59           Leatha Mobley APRN   Nurse Practitioner   Medicine   Progress Notes   Attested   Date of Service:  07/11/20 1131   Creation Time:  07/11/20 1131            Attested        Attestation signed by Aubrey Parker MD at 07/12/20 6096   I personally evaluated and examined the patient in conjunction with TERRENCE Tabor and agree with the assessment, treatment plan, and disposition of the patient as recorded by her. My history, exam, and further recommendations are:      S:  Doing ok, less SOA, on 10L high flow.  Afebrile     O:  CVS RRR     A:  PNA/COPD AE     P: continue IV steroids, IV abx, nebs        Aubrey Parker MD  7/11/2020  16:53                 Show:Clear all  [x]Manual[x]Template[]Copied    Added by:  [x]Leatha Mobley APRN    []Rosendo for details                Objective    Temp:  [98.1 °F (36.7 °C)-98.5 °F (36.9 °C)] 98.1 °F (36.7 °C)  Heart Rate:  [] 107  Resp:  [16-25] 16  BP: (110-133)/(58-83) 124/68  Physical Exam   Constitutional: She is oriented to person, place, and time. She appears well-developed and well-nourished. No distress.   HENT:   Head: Normocephalic and atraumatic.   Neck: Normal range of motion. Neck supple. No JVD present. No tracheal deviation present.   Cardiovascular: Regular rhythm and intact distal pulses. Exam reveals no gallop and no friction rub.   Tachycardic.  Sinus tach 105-120 overnight, up to 144.   Pulmonary/Chest: She has wheezes (Faint exp wheeze RLL). She has no rales.   Grossly  diminished throughout.  Dyspnea with exertion.   Abdominal: Soft. Bowel sounds are normal. She exhibits no distension. There is no tenderness. There is no guarding.   Musculoskeletal: Normal range of motion. She exhibits no edema or tenderness.   Neurological: She is alert and oriented to person, place, and time. No cranial nerve deficit.   Skin: Skin is warm and dry. No rash noted. No erythema.   Psychiatric: She has a normal mood and affect. Her behavior is normal. Judgment and thought content normal.   Vitals reviewed.     Results Review:  I have reviewed the labs, radiology results, and diagnostic studies.     Active Hospital Problems     Diagnosis   • **Pneumonia due to infectious organism   • Acute blood loss anemia   • CHF (congestive heart failure) (CMS/[facility])   • Type 2 diabetes mellitus, without long-term current use of insulin (CMS/[facility])   • CAD (coronary artery disease)   • Chronic obstructive pulmonary disease with acute exacerbation (CMS/[facility])      Plan:  1.  Patient admitted 7/10 with shortness of breath.  She did have an elevated d-dimer level, prompting a CTA of the chest.  This shows infiltrates in the left lung, most significant in the left lower lobe, likely due to pneumonia.  2.  Patient was initially given Rocephin and azithromycin.  However, given recent hospital admission in May, will change her antibiotic therapy for more appropriate coverage for healthcare associated pneumonia.  Start IV vancomycin and cefepime.  Respiratory culture if the patient is able to produce sputum.  Blood cultures pending.  Urinary antigens for strep and Legionella negative.  MRSA nasal swab was negative, so vancomycin will likely be able to be discontinued tomorrow.  3.  Continue duo nebs-monitor tachycardia closely.  Continue Mucinex, incentive spirometer added.  Low-dose oral prednisone.  4.  Continue to wean supplemental oxygen back to home setting of 4 L as tolerated.  5.  Patient does have worsening anemia  compared to last month.  Hemoglobin 12.12 on 6/4, now 10.6 on admission.  An occult blood stool was checked, which was found to be positive.  Do not feel patient has an acute bleed at this time.  Continue home medication oral Protonix.  She has never had a colonoscopy.  This could be arranged by Dr. Burns at time of discharge.  Will start oral iron supplement.  Ok to resume Plavix, will monitor.  6.  Last blood glucoses-238, 202.  Increase to moderate dose sliding scale.  Continue to hold metformin for 48 hours following contrasted study.  7.  PT/OT consults  8.  Labs in AM     Discharge Planning: I expect the patient to be discharged to home with home health in 2-3 days.     Leatha Mobley, APRN   07/11/20   11:31  Differential   Order: [id number] - Part of Panel Order [id number]   Status:  Final result   Visible to patient:  No (Not Released)   Specimen Information: Blood        Component  Ref Range & Units 02:34 1d ago 2d ago 3d ago   WBC  3.40 - 10.80 10*3/mm3 14.29High   8.90  11.73High   13.85High     RBC  3.77 - 5.28 10*6/mm3 3.51Low   4.02  3.70Low   3.92    Hemoglobin  12.0 - 15.9 g/dL 9.5Low   10.8Low   10.0Low   10.6Low     Hematocrit  34.0 - 46.6 % 30.6Low   34.8  32.3Low   33.9Low     MCV  79.0 - 97.0 fL 87.2  86.6  87.3  86.5    MCH  26.6 - 33.0 pg 27.1  26.9  27.0  27.0    MCHC  31.5 - 35.7 g/dL 31.0Low   31.0Low   31.0Low   31.3Low     RDW  12.3 - 15.4 % 15.0  14.9  15.0  15.1    RDW-SD  37.0 - 54.0 fl 48.0  47.5  48.1  47.8    MPV  6.0 - 12.0 fL 10.5  10.1  10.1  9.7    Platelets  140 - 450 10*3/mm3 282  283  269  292    Neutrophil %  42.7 - 76.0 % 82.9High   88.9High   73.4  79.1High     Lymphocyte %  19.6 - 45.3 % 8.6Low   6.6Low   10.9Low   8.7Low     Monocyte %  5.0 - 12.0 % 7.1  3.3Low   12.4High   10.0    Eosinophil %  0.3 - 6.2 % 0.3  0.0Low   1.1  0.6    Basophil %  0.0 - 1.5 % 0.4  0.6  0.8  0.5    Immature Grans %  0.0 - 0.5 % 0.7High   0.6High   1.4High   1.1High     Neutrophils,  Absolute  1.70 - 7.00 10*3/mm3 11.85High   7.92High   8.60High   10.94High     Lymphocytes, Absolute  0.70 - 3.10 10*3/mm3 1.23  0.59Low   1.28  1.21    Monocytes, Absolute  0.10 - 0.90 10*3/mm3 1.01High   0.29  1.46High   1.39High     Eosinophils, Absolute  0.00 - 0.40 10*3/mm3 0.04  0.00  0.13  0.09    Basophils, Absolute  0.00 - 0.20 10*3/mm3 0.06  0.05  0.09  0.07    Immature Grans, Absolute  0.00 - 0.05 10*3/mm3 0.10High   0.05  0.17High   0.15High     nRBC  0.0 - 0.2 /100 WBC 0.0  0.0  0.0  0.0    Resulting Agency  [facility]  [facility]  [facility]         Specimen Collected: 07/13/20 02:34 Last Resulted: 07/13/20 04:04        Lab Flowsheet      Order Details      View Encounter      Lab and Collection Details      Routing      Result History                     Basic Metabolic Panel [LAB15] (Order [id number])   Order   Date: 7/13/2020 Department: [address] Released By/Authorizing: Brodie Tate MD (auto-released)   Reprint Order Requisition     Basic Metabolic Panel (Order #[id number]) on 7/13/20       Contains abnormal data Basic Metabolic Panel   Order: [id number]   Status:  Final result   Visible to patient:  No (Not Released) Next appt:  09/23/2020 at 02:00 PM in Family Medicine (Arsh Burns DO)   Specimen Information: Blood        Component  Ref Range & Units 02:34 1d ago   Glucose  65 - 99 mg/dL 251High   354High  R, CM   BUN  8 - 23 mg/dL 18     Creatinine  0.57 - 1.00 mg/dL 0.60     Sodium  136 - 145 mmol/L 139     Potassium  3.5 - 5.2 mmol/L 4.4     Chloride  98 - 107 mmol/L 98     CO2  22.0 - 29.0 mmol/L 30.0High      Calcium  8.6 - 10.5 mg/dL 9.4     eGFR Non African Amer  >60 mL/min/1.73 98     BUN/Creatinine Ratio  7.0 - 25.0 30.0High      Anion Gap  5.0 - 15.0 mmol/L 11.0     Resulting Agency  [facility]  [facility]      Narrative   Performed by:  [facility]   GFR Normal >60  Chronic Kidney Disease <60  Kidney Failure <15      Specimen Collected: 07/13/20  02:34 Last Resulted: 07/13/20 04:18 Lab Flowsheet Order Details View Encounter Lab and Collection Details Routing Result History      CM=Additional comments  R=Reference range differs from displayed range           Result Read / Acknowledged      Acknowledge result                    Current Facility-Administered Medications   Medication Dose Route Frequency Provider Last Rate Last Dose   • acetaminophen (TYLENOL) tablet 650 mg  650 mg Oral Q4H PRN Brodie Tate MD       • albuterol (PROVENTIL) nebulizer solution 0.083% 2.5 mg/3mL  2.5 mg Nebulization Q6H PRN Brodie Tate MD       • arformoterol (BROVANA) nebulizer solution 15 mcg  15 mcg Nebulization BID - RT Leatha Mobley APRN   15 mcg at 07/13/20 0638   • atorvastatin (LIPITOR) tablet 80 mg  80 mg Oral Daily Brodie Tate MD   80 mg at 07/13/20 0842   • budesonide (PULMICORT) nebulizer solution 0.5 mg  0.5 mg Nebulization BID - RT Leatha Mobley APRN   0.5 mg at 07/13/20 0635   • cefdinir (OMNICEF) capsule 300 mg  300 mg Oral Q12H Leatha Mobley APRN       • clopidogrel (PLAVIX) tablet 75 mg  75 mg Oral Daily Leatha Mobley APRN   75 mg at 07/13/20 0842   • dextrose (D50W) 25 g/ 50mL Intravenous Solution 25 g  25 g Intravenous Q15 Min PRN Brodie Tate MD       • dextrose (GLUTOSE) oral gel 15 g  15 g Oral Q15 Min PRN Brodie Tate MD       • dilTIAZem CD (CARDIZEM CD) 24 hr capsule 120 mg  120 mg Oral Daily Brodie Tate MD   120 mg at 07/13/20 0842   • docusate sodium (COLACE) capsule 100 mg  100 mg Oral BID PRN Brodie Tate MD       • escitalopram (LEXAPRO) tablet 10 mg  10 mg Oral Daily Brodie Tate MD   10 mg at 07/13/20 0842   • ferrous sulfate tablet 325 mg  325 mg Oral Daily With Breakfast Leatha Mobley APRN   325 mg at 07/13/20 0842   • glucagon (human recombinant) (GLUCAGEN DIAGNOSTIC) injection 1 mg  1 mg Subcutaneous Q15 Min PRN  Brodie Tate MD       • guaiFENesin (MUCINEX) 12 hr tablet 1,200 mg  1,200 mg Oral BID Brodie Tate MD   1,200 mg at 07/13/20 0842   • hydrOXYzine (ATARAX) tablet 25 mg  25 mg Oral Q8H PRN Brodie Tate MD       • insulin lispro (humaLOG) injection 0-24 Units  0-24 Units Subcutaneous TID AC Leatha Mobely APRN   8 Units at 07/13/20 0841   • ipratropium-albuterol (DUO-NEB) nebulizer solution 3 mL  3 mL Nebulization 4x Daily - RT Brodie Tate MD   3 mL at 07/13/20 1043   • melatonin tablet 5.25 mg  5.25 mg Oral Nightly PRN Brodie Tate MD   5.25 mg at 07/13/20 0206   • montelukast (SINGULAIR) tablet 10 mg  10 mg Oral Nightly Brodie Tate MD   10 mg at 07/12/20 2031   • pantoprazole (PROTONIX) EC tablet 40 mg  40 mg Oral Q AM Brodie Tate MD   40 mg at 07/13/20 0609   • [START ON 7/14/2020] predniSONE (DELTASONE) tablet 10 mg  10 mg Oral Daily With Breakfast Leatha Mobley APRN       • sodium chloride 0.9 % flush 10 mL  10 mL Intravenous PRN Marco John MD       • sodium chloride 0.9 % flush 10 mL  10 mL Intravenous Q12H Brodie Tate MD   10 mL at 07/13/20 0842

## 2020-07-13 NOTE — PLAN OF CARE
Problem: Fall Risk (Adult)  Goal: Absence of Fall  Outcome: Ongoing (interventions implemented as appropriate)     Problem: Patient Care Overview  Goal: Plan of Care Review  Outcome: Ongoing (interventions implemented as appropriate)  Flowsheets (Taken 7/13/2020 0229)  Progress: no change  Plan of Care Reviewed With: patient  Note:   Patient up to BSC, good UOP.  O2 sat staying good while on O2 NC and CPAP w/ O2.  IV abx continued.  No falls noted, up to BSC with asst x 1.  Continue to monitor.   Goal: Individualization and Mutuality  Outcome: Ongoing (interventions implemented as appropriate)  Flowsheets (Taken 7/11/2020 0357)  Patient Specific Goals (Include Timeframe): breathing easier  Patient Specific Interventions: O2 regulated by RT  Patient Specific Preferences: bsc  Goal: Discharge Needs Assessment  Outcome: Ongoing (interventions implemented as appropriate)  Goal: Interprofessional Rounds/Family Conf  Outcome: Ongoing (interventions implemented as appropriate)     Problem: Pneumonia (Adult)  Goal: Signs and Symptoms of Listed Potential Problems Will be Absent, Minimized or Managed (Pneumonia)  Outcome: Ongoing (interventions implemented as appropriate)

## 2020-07-13 NOTE — PROGRESS NOTES
Discharge Planning Assessment  [facility]     Patient Name: Jessica Gregg  MRN: [id number]  Today's Date: 7/13/2020    Admit Date: 7/10/2020    Discharge Needs Assessment     Row Name 07/13/20 1318       Living Environment    Lives With  sibling(s)    Name(s) of Who Lives With Patient  Mariaa    Current Living Arrangements  home/apartment/condo    Primary Care Provided by  self    Provides Primary Care For  no one    Family Caregiver if Needed  sibling(s)    Quality of Family Relationships  helpful;involved;supportive    Able to Return to Prior Arrangements  yes       Resource/Environmental Concerns    Resource/Environmental Concerns  none    Transportation Concerns  car, none       Transition Planning    Patient/Family Anticipates Transition to  home with family;home with help/services    Patient/Family Anticipated Services at Transition  none    Transportation Anticipated  family or friend will provide       Discharge Needs Assessment    Readmission Within the Last 30 Days  no previous admission in last 30 days    Concerns to be Addressed  no discharge needs identified;denies needs/concerns at this time    Equipment Currently Used at Home  walker, rolling;oxygen    Anticipated Changes Related to Illness  none    Equipment Needed After Discharge  none    Discharge Coordination/Progress  Pt has RX coverage and a PCP listed but the PCP listed is not local. Per last admission's documentation pt was set up with appointment with Dr. Burns. SW contacted the office and pt did go to doctors appointment and Dr Burns is her new PCP. Pt plans on returning home at discharge. PT has recommended HH and pt is able to have HH now that PCP is established here. SW will set up HH at discharge when ordered. No other needs identified at this time.         Discharge Plan    No documentation.       Destination      Coordination has not been started for this encounter.      Durable Medical Equipment      Coordination has not been started  for this encounter.      Dialysis/Infusion      Coordination has not been started for this encounter.      Home Medical Care      Coordination has not been started for this encounter.      Therapy      Coordination has not been started for this encounter.      Community Resources      Coordination has not been started for this encounter.          Demographic Summary    No documentation.       Functional Status    No documentation.       Psychosocial    No documentation.       Abuse/Neglect    No documentation.       Legal    No documentation.       Substance Abuse    No documentation.       Patient Forms    No documentation.           Carmina Cedillo

## 2020-07-13 NOTE — PLAN OF CARE
Problem: Patient Care Overview  Goal: Plan of Care Review  Outcome: Ongoing (interventions implemented as appropriate)  Flowsheets (Taken 7/13/2020 3817)  Progress: improving  Plan of Care Reviewed With: patient  Note:   Pt. Fowlers in bed, c/o's not sleeping last night, and being tired! Pt. Performed ue exs instead of showering, pt. States that she is afraid of trying to sit on a bench or chair she's afraid she may fall off!

## 2020-07-14 LAB
BACTERIA SPEC RESP CULT: NORMAL
GLUCOSE BLDC GLUCOMTR-MCNC: 152 MG/DL (ref 70–130)
GLUCOSE BLDC GLUCOMTR-MCNC: 210 MG/DL (ref 70–130)
GLUCOSE BLDC GLUCOMTR-MCNC: 302 MG/DL (ref 70–130)
GLUCOSE BLDC GLUCOMTR-MCNC: 303 MG/DL (ref 70–130)
GRAM STN SPEC: NORMAL

## 2020-07-14 PROCEDURE — 82962 GLUCOSE BLOOD TEST: CPT

## 2020-07-14 PROCEDURE — 63710000001 PREDNISONE PER 5 MG: Performed by: NURSE PRACTITIONER

## 2020-07-14 PROCEDURE — 97110 THERAPEUTIC EXERCISES: CPT

## 2020-07-14 PROCEDURE — 94799 UNLISTED PULMONARY SVC/PX: CPT

## 2020-07-14 PROCEDURE — 63710000001 INSULIN DETEMIR PER 5 UNITS: Performed by: NURSE PRACTITIONER

## 2020-07-14 PROCEDURE — 97116 GAIT TRAINING THERAPY: CPT

## 2020-07-14 PROCEDURE — 63710000001 INSULIN LISPRO (HUMAN) PER 5 UNITS: Performed by: NURSE PRACTITIONER

## 2020-07-14 RX ADMIN — GUAIFENESIN 1200 MG: 600 TABLET, EXTENDED RELEASE ORAL at 09:05

## 2020-07-14 RX ADMIN — PREDNISONE 10 MG: 10 TABLET ORAL at 09:05

## 2020-07-14 RX ADMIN — CLOPIDOGREL 75 MG: 75 TABLET, FILM COATED ORAL at 09:05

## 2020-07-14 RX ADMIN — INSULIN DETEMIR 10 UNITS: 100 INJECTION, SOLUTION SUBCUTANEOUS at 21:13

## 2020-07-14 RX ADMIN — SODIUM CHLORIDE, PRESERVATIVE FREE 10 ML: 5 INJECTION INTRAVENOUS at 09:09

## 2020-07-14 RX ADMIN — BUDESONIDE 0.5 MG: 0.5 INHALANT RESPIRATORY (INHALATION) at 18:16

## 2020-07-14 RX ADMIN — INSULIN LISPRO 8 UNITS: 100 INJECTION, SOLUTION INTRAVENOUS; SUBCUTANEOUS at 09:05

## 2020-07-14 RX ADMIN — GUAIFENESIN 1200 MG: 600 TABLET, EXTENDED RELEASE ORAL at 21:11

## 2020-07-14 RX ADMIN — DILTIAZEM HYDROCHLORIDE 120 MG: 120 CAPSULE, COATED, EXTENDED RELEASE ORAL at 09:05

## 2020-07-14 RX ADMIN — IPRATROPIUM BROMIDE AND ALBUTEROL SULFATE 3 ML: 2.5; .5 SOLUTION RESPIRATORY (INHALATION) at 13:54

## 2020-07-14 RX ADMIN — IPRATROPIUM BROMIDE AND ALBUTEROL SULFATE 3 ML: 2.5; .5 SOLUTION RESPIRATORY (INHALATION) at 10:18

## 2020-07-14 RX ADMIN — INSULIN LISPRO 16 UNITS: 100 INJECTION, SOLUTION INTRAVENOUS; SUBCUTANEOUS at 12:26

## 2020-07-14 RX ADMIN — ESCITALOPRAM 10 MG: 10 TABLET, FILM COATED ORAL at 09:05

## 2020-07-14 RX ADMIN — METFORMIN HYDROCHLORIDE 1000 MG: 500 TABLET ORAL at 09:05

## 2020-07-14 RX ADMIN — IPRATROPIUM BROMIDE AND ALBUTEROL SULFATE 3 ML: 2.5; .5 SOLUTION RESPIRATORY (INHALATION) at 07:06

## 2020-07-14 RX ADMIN — INSULIN LISPRO 4 UNITS: 100 INJECTION, SOLUTION INTRAVENOUS; SUBCUTANEOUS at 18:06

## 2020-07-14 RX ADMIN — ATORVASTATIN CALCIUM 80 MG: 40 TABLET, FILM COATED ORAL at 09:05

## 2020-07-14 RX ADMIN — ARFORMOTEROL TARTRATE 15 MCG: 15 SOLUTION RESPIRATORY (INHALATION) at 07:06

## 2020-07-14 RX ADMIN — IPRATROPIUM BROMIDE AND ALBUTEROL SULFATE 3 ML: 2.5; .5 SOLUTION RESPIRATORY (INHALATION) at 18:16

## 2020-07-14 RX ADMIN — CEFDINIR 300 MG: 300 CAPSULE ORAL at 09:05

## 2020-07-14 RX ADMIN — BUDESONIDE 0.5 MG: 0.5 INHALANT RESPIRATORY (INHALATION) at 07:06

## 2020-07-14 RX ADMIN — METFORMIN HYDROCHLORIDE 1000 MG: 500 TABLET ORAL at 18:06

## 2020-07-14 RX ADMIN — ARFORMOTEROL TARTRATE 15 MCG: 15 SOLUTION RESPIRATORY (INHALATION) at 18:33

## 2020-07-14 RX ADMIN — MONTELUKAST SODIUM 10 MG: 10 TABLET, FILM COATED ORAL at 21:11

## 2020-07-14 RX ADMIN — SODIUM CHLORIDE, PRESERVATIVE FREE 10 ML: 5 INJECTION INTRAVENOUS at 21:11

## 2020-07-14 RX ADMIN — FERROUS SULFATE TAB 325 MG (65 MG ELEMENTAL FE) 325 MG: 325 (65 FE) TAB at 09:05

## 2020-07-14 RX ADMIN — CEFDINIR 300 MG: 300 CAPSULE ORAL at 21:11

## 2020-07-14 RX ADMIN — PANTOPRAZOLE SODIUM 40 MG: 40 TABLET, DELAYED RELEASE ORAL at 05:22

## 2020-07-14 NOTE — PLAN OF CARE
Problem: Patient Care Overview  Goal: Plan of Care Review  Outcome: Ongoing (interventions implemented as appropriate)  Flowsheets (Taken 7/14/2020 3423)  Progress: improving  Plan of Care Reviewed With: patient  Note:   Pt. Refused shower offered by this pendleton/l, agreed to dalila sánchez, states that she was cleaned up this a.m. By Nsg! Pt. Says she feels better and may go home 7/15/2020!

## 2020-07-14 NOTE — CONSULTS
Pt awake and alert.  New Insulin instruction for home use.  Discussed action of Insulin, timing of injection, site rotation, needle disposal and reason for use at this time.  She is agreeable.  Given glucometer sample for home use.  Will cont to follow for discharge needs.

## 2020-07-14 NOTE — THERAPY TREATMENT NOTE
Acute Care - Occupational Therapy Treatment Note  [facility]     Patient Name: Jessica Gregg  : 1945  MRN: [id number]  Today's Date: 2020  Onset of Illness/Injury or Date of Surgery: 07/10/20  Date of Referral to OT: 20  Referring Physician: Dr. Parker    Admit Date: 7/10/2020       ICD-10-CM ICD-9-CM   1. Pneumonia due to infectious organism, unspecified laterality, unspecified part of lung J18.9 486   2. Gastrointestinal hemorrhage, unspecified gastrointestinal hemorrhage type K92.2 578.9   3. Decreased activities of daily living (ADL) Z78.9 V49.89   4. Impaired mobility Z74.09 799.89     Patient Active Problem List   Diagnosis   • Chronic obstructive pulmonary disease with acute exacerbation (CMS/HCC)   • CHF (congestive heart failure) (CMS/[facility])   • Type 2 diabetes mellitus, without long-term current use of insulin (CMS/[facility])   • CAD (coronary artery disease)   • COPD exacerbation (CMS/[facility])   • Acute on chronic respiratory failure with hypoxia and hypercapnia (CMS/[facility])   • Pneumonia due to infectious organism   • Acute blood loss anemia   • Iron deficiency anemia     Past Medical History:   Diagnosis Date   • Asthma    • CHF (congestive heart failure) (CMS/HCC)    • COPD (chronic obstructive pulmonary disease) (CMS/HCC)    • Diabetes mellitus (CMS/HCC)    • Emphysema lung (CMS/HCC)    • Hyperlipidemia    • Hypertension    • Mediastinal lymphadenopathy    • Stroke (CMS/HCC)      Past Surgical History:   Procedure Laterality Date   • CAROTID STENT     • CHOLECYSTECTOMY     • COLONOSCOPY     • ESOPHAGUS SURGERY     • SIGMOIDOSCOPY         Therapy Treatment    Rehabilitation Treatment Summary     Row Name 20 1430 20 1402 20 1034       Treatment Time/Intention    Discipline  physical therapy assistant  -BERNIE  occupational therapy assistant  -CJ  physical therapy assistant  -BERNIE    Document Type  therapy note (daily note)  -BERNIE  therapy note (daily note)  -NOAM  therapy note (daily note)   -JP2    Subjective Information  no complaints  -JP2  complains of;weakness;fatigue  -  no complaints  -JP2    Mode of Treatment  --  occupational therapy  -CJ  --    Patient Effort  --  good  -CJ  --    Comment  --  --  wears Ox at home on 4LPM  -JP2    Existing Precautions/Restrictions  fall;oxygen therapy device and L/min  -BERNIE  fall;oxygen therapy device and L/min  -CJ  fall;oxygen therapy device and L/min  -JP2    Recorded by [BERNIE] Tatiana Raya, PTA 07/14/20 1432  [JP2] Tatiana Raya, PTA 07/14/20 1433 [CJ] Gilberto Benson, RILEY/L 07/14/20 1458 [BERNIE] Tatiana Raya, PTA 07/14/20 1034  [JP2] Tatiana Raya, PTA 07/14/20 1104    Row Name 07/14/20 1034             Vital Signs    Pre SpO2 (%)  95  -BERNIE      O2 Delivery Pre Treatment  supplemental O2 4lpm  -BERNIE      Post SpO2 (%)  91  -BERNIE      O2 Delivery Post Treatment  supplemental O2 4lpm  -BERNIE      Recorded by [BERNIE] Tatiana Raya, PTA 07/14/20 1104      Row Name 07/14/20 1430 07/14/20 1402 07/14/20 1034       Bed Mobility Assessment/Treatment    Supine-Sit [place] (Bed Mobility)  independent  -BERNIE  --  independent  -BERNIE    Sit-Supine [place] (Bed Mobility)  supervision  -BERNIE  --  --    Comment (Bed Mobility)  --  fowlers in bed!  -CJ  --    Recorded by [BERNIE] Tatiana Raya, PTA 07/14/20 1440 [CJ] Gilberto Benson RILEY/L 07/14/20 1458 [BERNIE] Tatiana Raya, PTA 07/14/20 1104    Row Name 07/14/20 1034             Sit-Stand Transfer    Sit-Stand [place] (Transfers)  supervision  -BERNIE      Recorded by [BERNIE] Tatiana Raya, PTA 07/14/20 1111      Row Name 07/14/20 1034             Stand-Sit Transfer    Stand-Sit [place] (Transfers)  supervision  -BERNIE      Recorded by [BERNIE] Tatiana Raya, PTA 07/14/20 1111      Row Name 07/14/20 1034             Toilet Transfer    Type (Toilet Transfer)  sit-stand;stand-sit  -BERNIE      [place]ox Level (Toilet Transfer)  independent  -BERNIE      Recorded by [BERNIE] Tatiana Raya, PTA 07/14/20  1111      Row Name 07/14/20 1430 07/14/20 1034          Gait/Stairs Assessment/Training    [place] Level (Gait)  supervision  -BERNIE  supervision  -BERNIE     Assistive Device (Gait)  walker, front-wheeled  -BERNIE  walker, front-wheeled  -BERNIE     Distance in Feet (Gait)  120  -BERNIE  2x80 1 sitting rest  -BERNIE     Deviations/Abnormal Patterns (Gait)  gait speed decreased;stride length decreased  -BERNIE  gait speed decreased  -BERNIE     Recorded by [BERNIE] Tatiana Raya, PTA 07/14/20 1442 [BERNIE] Tatiana Raya, PTA 07/14/20 1111     Row Name 07/14/20 1402             Motor Skills Assessment/Interventions    Additional Documentation  Therapeutic Exercise (Group)  -      Recorded by [CJ] Gilberto Benson COTA/L 07/14/20 1458      Row Name 07/14/20 1402 07/14/20 1034          Therapeutic Exercise    Upper Extremity (Therapeutic Exercise)  bicep curl, bilateral;wand exercises  -  --     Upper Extremity Range of Motion (Therapeutic Exercise)  elbow flexion/extension, bilateral;wrist flexion/extension, bilateral  -  --     Lower Extremity Range of Motion (Therapeutic Exercise)  --  hip flexion/extension, bilateral;hip abduction/adduction, bilateral;knee flexion/extension, bilateral;ankle dorsiflexion/plantar flexion, bilateral  -BERNIE     Weight/Resistance (Therapeutic Exercise)  2 pounds;3 pounds  -  --     Exercise Type (Therapeutic Exercise)  AROM (active range of motion)  -  AROM (active range of motion)  -BERNIE     Position (Therapeutic Exercise)  seated  -CJ  seated  -BERNIE     Sets/Reps (Therapeutic Exercise)  2 sets x 20 reps!  -CJ  2x15  -BERNIE     Equipment (Therapeutic Exercise)  dowel blu/wand;free weight, barbell  -  --     Expected Outcome (Therapeutic Exercise)  facilitate normal movement patterns;improve functional stability;improve functional tolerance, self-care activity;improve motor control;improve neuromuscular control;improve performance, BADLs;improve performance, fine motor coordination skills;improve  performance, gait skills;improve performance, transfer skills;improve postural control;increase active range of motion  -CJ  --     Comment (Therapeutic Exercise)  --  multiple rests  -BERNIE     Recorded by [CJ] Gilberto Benson COTA/ANTIONE 07/14/20 1458 [BERNIE] Tatiana Raya, [facility] 07/14/20 1111     Row Name 07/14/20 1034             Static Sitting Balance    Level of [place] (Unsupported Sitting, Static Balance)  independent  -BERNIE      Sitting Position (Unsupported Sitting, Static Balance)  sitting on edge of bed  -BERNIE      Recorded by [BERNIE] Tatiana Raya, PTA 07/14/20 1111      Row Name 07/14/20 1430 07/14/20 1402 07/14/20 1034       Positioning and Restraints    Pre-Treatment Position  in bed  -BERNIE  in bed  -CJ  in bed  -BERNIE    Post Treatment Position  bed  -BERNIE  bed  -CJ  chair  -BERNIE    In Bed  fowlers;call light within reach;encouraged to call for assist  -BERNIE  fowlers;call light within reach;encouraged to call for assist;side rails up x2  -CJ  --    In Chair  --  --  reclined;call light within reach  -BERNIE    Recorded by [BERNIE] Tatiana Raya, PTA 07/14/20 1442 [CJ] Gilberto Benson COTA/ANTIONE 07/14/20 1458 [BERNIE] Tatiana Raya, [facility] 07/14/20 1111    Row Name 07/14/20 1402             Pain Assessment    Additional Documentation  Pain Scale 2: Word Pre/Post-Treatment (Group)  -CJ      Recorded by [CJ] Gilberto Benson COTA/L 07/14/20 1458      Row Name 07/14/20 1430 07/14/20 1402 07/14/20 1034       Pain Scale: Numbers Pre/Post-Treatment    Pain Scale: Numbers, Pretreatment  --  0/10 - no pain  -CJ  --    Pain Scale: Numbers, Post-Treatment  0/10 - no pain  -BERNIE  0/10 - no pain  -CJ  0/10 - no pain  -BERNIE    Pain Intervention(s)  --  Rest  -CJ  --    Recorded by [BERNIE] Tatiana Raya, PTA 07/14/20 1442 [CJ] Gilberto Benson COTA/ANTIONE 07/14/20 1458 [BERNIE] Tatiana Raya, PTA 07/14/20 1111    Row Name 07/14/20 1402             Outcome Summary/Treatment Plan (OT)    Daily Summary of Progress (OT)  progress toward  functional goals is good  -      Barriers to Overall Progress (OT)  Fall/o2!  -      Plan for Continued Treatment (OT)  continue with ot poc!  -CJ      Recorded by [CJ] Gilberto Benson, RILEY/L 07/14/20 1458        User Key  (r) = Recorded By, (t) = Taken By, (c) = Cosigned By    Initials Name Effective Dates Discipline     Gilberto Benson DIOGO, RILEY/L 08/02/16 -  OT    BERNIE Tatiana Ryaa PTA 08/02/16 -  PT             Occupational Therapy Education                 Title: PT OT SLP Therapies (In Progress)     Topic: Occupational Therapy (Done)     Point: ADL training (Done)     Description:   Instruct learner(s) on proper safety adaptation and remediation techniques during self care or transfers.   Instruct in proper use of assistive devices.              Learning Progress Summary           Patient Acceptance, E, VU by SANDRA at 7/12/2020 1509                   Point: Home exercise program (Done)     Description:   Instruct learner(s) on appropriate technique for monitoring, assisting and/or progressing therapeutic exercises/activities.              Learning Progress Summary           Patient Acceptance, E,TB, VU,DU,NR by  at 7/14/2020 1458    Comment:  Pt. performed ue exs to increase her act. russell with adl tasks!    Acceptance, E,TB, VU,DU,NR by NOAM at 7/13/2020 1518    Comment:  Pt. performs ue exs to increase her act. russell!    Acceptance, E, VU by SANDRA at 7/12/2020 1509                   Point: Precautions (Done)     Description:   Instruct learner(s) on prescribed precautions during self-care and functional transfers.              Learning Progress Summary           Patient Acceptance, E,TB, VU,DU,NR by NOAM at 7/14/2020 1458    Comment:  Pt. performed ue exs to increase her act. russell with adl tasks!    Acceptance, E,TB, VU,DU,NR by NOAM at 7/13/2020 1518    Comment:  Pt. performs ue exs to increase her act. russell!    Acceptance, E, VU by SANDRA at 7/12/2020 1509                   Point: Body mechanics (Done)      Description:   Instruct learner(s) on proper positioning and spine alignment during self-care, functional mobility activities and/or exercises.              Learning Progress Summary           Patient Acceptance, E,TB, VU,DU,NR by  at 7/14/2020 1458    Comment:  Pt. performed ue exs to increase her act. russell with adl tasks!    Acceptance, E,TB, VU,DU,NR by  at 7/13/2020 1518    Comment:  Pt. performs ue exs to increase her act. russell!    Acceptance, E, VU by  at 7/12/2020 1509                               User Key     Initials Effective Dates Name Provider Type Discipline     08/02/16 -  Gilberto Benson COTA/L Occupational Therapy Assistant OT     07/05/20 -  Magdalena Winkler OTR/L Occupational Therapist OT                OT Recommendation and Plan  Outcome Summary/Treatment Plan (OT)  Daily Summary of Progress (OT): progress toward functional goals is good  Barriers to Overall Progress (OT): Fall/o2!  Plan for Continued Treatment (OT): continue with ot poc!  Daily Summary of Progress (OT): progress toward functional goals is good  Plan of Care Review  Plan of Care Reviewed With: patient  Plan of Care Reviewed With: patient  Outcome Measures     Row Name 07/14/20 1402 07/13/20 1303 07/12/20 1500       How much help from another is currently needed...    Putting on and taking off regular lower body clothing?  4  -  4  -CJ  4  -JJ    Bathing (including washing, rinsing, and drying)  3  -  3  -CJ  3  -JJ    Toileting (which includes using toilet bed pan or urinal)  3  -CJ  3  -CJ  3  -JJ    Putting on and taking off regular upper body clothing  4  -  4  -CJ  4  -JJ    Taking care of personal grooming (such as brushing teeth)  4  -  4  -CJ  4  -JJ    Eating meals  4  -CJ  4  -CJ  4  -JJ    AM-PAC 6 Clicks Score (OT)  22  -  22  -CJ  22  -JJ       Functional Assessment    Outcome Measure Options  AM-PAC 6 Clicks Daily Activity (OT)  -  AM-PAC 6 Clicks Daily Activity (OT)  -  AM-PAC 6 Clicks  Daily Activity (OT)  -SANDRA      User Key  (r) = Recorded By, (t) = Taken By, (c) = Cosigned By    Initials Name Provider Type    Gilberto Pereira COTA/L Occupational Therapy Assistant    Magdalena Mann OTR/L Occupational Therapist           Time Calculation:   Time Calculation- OT     Row Name 07/14/20 1442 07/14/20 1402 07/14/20 1116       Time Calculation- OT    OT Start Time  --  1402  -  --    OT Stop Time  --  1430  -  --    OT Time Calculation (min)  --  28 min  -  --    Total Timed Code Minutes- OT  --  28 minute(s)  -  --    TCU Minutes- OT  --  28 min  -  --    OT Received On  --  07/14/20  -  --    OT Goal Re-Cert Due Date  --  07/22/20  -  --       Timed Charges    55028 - Gait Training Minutes   11  -BERNIE  --  13  -BERNIE      User Key  (r) = Recorded By, (t) = Taken By, (c) = Cosigned By    Initials Name Provider Type    Gilberto Pereira COTA/L Occupational Therapy Assistant    Tatiana Mack, PTA Physical Therapy Assistant        Therapy Charges for Today     Code Description Service Date Service Provider Modifiers Qty    94381995977 HC OT THER PROC EA 15 MIN 7/13/2020 Gilberto Benson COTA/L GO 2    01283988431 HC OT THER PROC EA 15 MIN 7/14/2020 Gilberto Benson COTA/L GO 2               MARILIN Pandey  7/14/2020

## 2020-07-14 NOTE — PROGRESS NOTES
[facility]  INPATIENT PROGRESS NOTE    Length of Stay: 4  Date of Admission: 7/10/2020  Primary Care Physician: Chandan, Sushma, MD    Subjective   Chief Complaint: Follow-up  HPI   Patient sitting up in bed eating breakfast.  She reports she feels better overall.  She feels she is likely at her respiratory baseline.  She is tolerating her home setting of oxygen at 4 L.  She is coughing very little, not producing sputum when she does cough.  She denies any abdominal pain, nausea, or vomiting.    Review of Systems   All pertinent negatives and positives are as above. All other systems have been reviewed and are negative unless otherwise stated.     Objective    Temp:  [97.5 °F (36.4 °C)-98.2 °F (36.8 °C)] 97.5 °F (36.4 °C)  Heart Rate:  [] 83  Resp:  [16-20] 18  BP: (114-126)/(51-65) 124/57  Physical Exam  Constitutional: She is oriented to person, place, and time. She appears well-developed and well-nourished. No distress.   HENT:   Head: Normocephalic and atraumatic.   Neck: Normal range of motion. Neck supple. No JVD present. No tracheal deviation present.   Cardiovascular: Regular rhythm and intact distal pulses. Exam reveals no gallop and no friction rub.   Sinus-sinus tach   Pulmonary/Chest: She has no wheezes. She has no rales.   Grossly diminished throughout.   Abdominal: Soft. Bowel sounds are normal. She exhibits no distension. There is no tenderness. There is no guarding.   Musculoskeletal: Normal range of motion. She exhibits no edema or tenderness.   Neurological: She is alert and oriented to person, place, and time. No cranial nerve deficit.   Skin: Skin is warm and dry. No rash noted. No erythema.   Psychiatric: She has a normal mood and affect. Her behavior is normal. Judgment and thought content normal.   Vitals reviewed.    Results Review:  I have reviewed the labs, radiology results, and diagnostic studies.    Laboratory Data:    Results from last 7 days   Lab Units 07/13/20  0234 07/12/20  0209 07/11/20  0526   WBC 10*3/mm3 14.29* 8.90 11.73*   HEMOGLOBIN g/dL 9.5* 10.8* 10.0*   HEMATOCRIT % 30.6* 34.8 32.3*   PLATELETS 10*3/mm3 282 283 269     Results from last 7 days   Lab Units 07/13/20  0234 07/12/20  0209 07/11/20  0526 07/10/20  1744   SODIUM mmol/L 139 136 140 137   POTASSIUM mmol/L 4.4 4.6 4.0 4.3   CHLORIDE mmol/L 98 94* 98 96*   CO2 mmol/L 30.0* 30.0* 32.0* 28.0   BUN mg/dL 18 17 13 16   CREATININE mg/dL 0.60 0.50* 0.68 0.49*   CALCIUM mg/dL 9.4 10.0 9.2 9.5   BILIRUBIN mg/dL  --   --   --  0.2   ALK PHOS U/L  --   --   --  76   ALT (SGPT) U/L  --   --   --  9   AST (SGOT) U/L  --   --   --  14   GLUCOSE mg/dL 251* 314* 238* 289*     I have reviewed the patient current medications.     Assessment/Plan     Active Hospital Problems    Diagnosis   • **Pneumonia due to infectious organism   • Acute blood loss anemia   • Iron deficiency anemia   • Acute on chronic respiratory failure with hypoxia and hypercapnia (CMS/HCC)   • Type 2 diabetes mellitus, without long-term current use of insulin (CMS/[facility])   • CAD (coronary artery disease)   • Chronic obstructive pulmonary disease with acute exacerbation (CMS/[facility])     Plan:  1.  Patient admitted 7/10 with shortness of breath.  She did have an elevated d-dimer level, prompting a CTA of the chest.  This showed infiltrates in the left lung, most significant in the left lower lobe, likely due to pneumonia.  2.  Patient was initially given Rocephin and azithromycin.  However, given her recent hospital admission in May, her antibiotic therapy was changed to vancomycin and cefepime yesterday.  Urinary antigens for strep and Legionella are negative.  MRSA nasal swab was negative.  Respiratory culture shows growth of normal respiratory sander.  1 blood culture positive for coagulase-negative staph, likely contaminant.  Patient afebrile.  Elevation in white blood cell count today likely reaction  from steroid therapy.  3.  Patient transitioned to oral Omnicef yesterday, will complete a total of 7 days of therapy.  4.  Continue Brovana and Pulmicort nebulizers.  Continue duo nebs, Mucinex, and encourage use of incentive spirometer.  Oral prednisone weaned today.  5.  Last blood glucoses- 261, 276, 210.  Hemoglobin A1c checked-9.6%.  This was 7.1% last year.  Increase metformin to 1000 mg twice daily.  Also discussed different options with the patient including adding a basal insulin versus additional oral agent, and she states she would be willing to try basal insulin.  Will start Levemir 10 units nightly.  Consult diabetes educator for insulin teaching.  6.  Patient does have worsening anemia compared to last month.  Hemoglobin 12.12 on 6/4, 10.6 on admission, and 9.6 yesterday.  Occult blood stool was found to be positive, however patient is not thought to have an acute bleed.  Continue oral Protonix and Plavix.  Patient has never had a colonoscopy.  This could be arranged by Dr. Burns at time of discharge  7.  Continue PT/OT.  Activity as tolerated.  Health at time of discharge.    Discharge Planning: I expect the patient to be discharged to home with home health tomorrow.     Leatha Mobley, TERRENCE   07/14/20   09:19

## 2020-07-14 NOTE — PLAN OF CARE
Problem: Patient Care Overview  Goal: Plan of Care Review  Outcome: Ongoing (interventions implemented as appropriate)  Flowsheets (Taken 7/14/2020 1111)  Outcome Summary: Pt. is independent in bed mobility. Independent in transfers. Ambulates 2 x80' w RWX and supervision. Actively works thru LE exercises. SAT on 4lpm(home dose) to 92%. Tolerated well.

## 2020-07-14 NOTE — PLAN OF CARE
Problem: Fall Risk (Adult)  Goal: Absence of Fall  7/14/2020 0351 by Mildred Olivas, RN  Outcome: Ongoing (interventions implemented as appropriate)  Note:   S/ST .  O2 4l/high flow.  Home cpap with sleep.  Patient is very shakey.  Melatonin given at bedtime.  Cont. To monitor labs.  Cont. Pulse ox wnl. Cont. To monitor.  Call for concerns.

## 2020-07-15 ENCOUNTER — READMISSION MANAGEMENT (OUTPATIENT)
Dept: CALL CENTER | Facility: HOSPITAL | Age: 75
End: 2020-07-15

## 2020-07-15 VITALS
OXYGEN SATURATION: 92 % | SYSTOLIC BLOOD PRESSURE: 113 MMHG | BODY MASS INDEX: 24.19 KG/M2 | HEART RATE: 85 BPM | RESPIRATION RATE: 20 BRPM | DIASTOLIC BLOOD PRESSURE: 64 MMHG | WEIGHT: 123.2 LBS | HEIGHT: 60 IN | TEMPERATURE: 98.1 F

## 2020-07-15 LAB
BACTERIA SPEC AEROBE CULT: NORMAL
GLUCOSE BLDC GLUCOMTR-MCNC: 150 MG/DL (ref 70–130)
GLUCOSE BLDC GLUCOMTR-MCNC: 204 MG/DL (ref 70–130)
GLUCOSE BLDC GLUCOMTR-MCNC: 242 MG/DL (ref 70–130)

## 2020-07-15 PROCEDURE — 94799 UNLISTED PULMONARY SVC/PX: CPT

## 2020-07-15 PROCEDURE — 97110 THERAPEUTIC EXERCISES: CPT

## 2020-07-15 PROCEDURE — 63710000001 PREDNISONE PER 5 MG: Performed by: NURSE PRACTITIONER

## 2020-07-15 PROCEDURE — 97535 SELF CARE MNGMENT TRAINING: CPT

## 2020-07-15 PROCEDURE — 97116 GAIT TRAINING THERAPY: CPT

## 2020-07-15 PROCEDURE — 82962 GLUCOSE BLOOD TEST: CPT

## 2020-07-15 PROCEDURE — 63710000001 INSULIN LISPRO (HUMAN) PER 5 UNITS: Performed by: NURSE PRACTITIONER

## 2020-07-15 RX ORDER — CEFDINIR 300 MG/1
300 CAPSULE ORAL EVERY 12 HOURS SCHEDULED
Qty: 4 CAPSULE | Refills: 0 | Status: SHIPPED | OUTPATIENT
Start: 2020-07-15 | End: 2020-07-17

## 2020-07-15 RX ORDER — PREDNISONE 10 MG/1
10 TABLET ORAL
Qty: 5 TABLET | Refills: 0 | Status: SHIPPED | OUTPATIENT
Start: 2020-07-16 | End: 2020-07-21

## 2020-07-15 RX ORDER — CHOLECALCIFEROL (VITAMIN D3) 125 MCG
5 CAPSULE ORAL NIGHTLY PRN
Start: 2020-07-15

## 2020-07-15 RX ADMIN — PREDNISONE 10 MG: 10 TABLET ORAL at 08:28

## 2020-07-15 RX ADMIN — ATORVASTATIN CALCIUM 80 MG: 40 TABLET, FILM COATED ORAL at 08:28

## 2020-07-15 RX ADMIN — INSULIN LISPRO 8 UNITS: 100 INJECTION, SOLUTION INTRAVENOUS; SUBCUTANEOUS at 17:03

## 2020-07-15 RX ADMIN — IPRATROPIUM BROMIDE AND ALBUTEROL SULFATE 3 ML: 2.5; .5 SOLUTION RESPIRATORY (INHALATION) at 14:27

## 2020-07-15 RX ADMIN — CEFDINIR 300 MG: 300 CAPSULE ORAL at 08:28

## 2020-07-15 RX ADMIN — SODIUM CHLORIDE, PRESERVATIVE FREE 10 ML: 5 INJECTION INTRAVENOUS at 08:29

## 2020-07-15 RX ADMIN — IPRATROPIUM BROMIDE AND ALBUTEROL SULFATE 3 ML: 2.5; .5 SOLUTION RESPIRATORY (INHALATION) at 06:58

## 2020-07-15 RX ADMIN — IPRATROPIUM BROMIDE AND ALBUTEROL SULFATE 3 ML: 2.5; .5 SOLUTION RESPIRATORY (INHALATION) at 10:36

## 2020-07-15 RX ADMIN — GUAIFENESIN 1200 MG: 600 TABLET, EXTENDED RELEASE ORAL at 08:28

## 2020-07-15 RX ADMIN — INSULIN LISPRO 4 UNITS: 100 INJECTION, SOLUTION INTRAVENOUS; SUBCUTANEOUS at 08:29

## 2020-07-15 RX ADMIN — INSULIN LISPRO 8 UNITS: 100 INJECTION, SOLUTION INTRAVENOUS; SUBCUTANEOUS at 11:47

## 2020-07-15 RX ADMIN — PANTOPRAZOLE SODIUM 40 MG: 40 TABLET, DELAYED RELEASE ORAL at 05:44

## 2020-07-15 RX ADMIN — CLOPIDOGREL 75 MG: 75 TABLET, FILM COATED ORAL at 08:28

## 2020-07-15 RX ADMIN — FERROUS SULFATE TAB 325 MG (65 MG ELEMENTAL FE) 325 MG: 325 (65 FE) TAB at 08:28

## 2020-07-15 RX ADMIN — ARFORMOTEROL TARTRATE 15 MCG: 15 SOLUTION RESPIRATORY (INHALATION) at 06:58

## 2020-07-15 RX ADMIN — DILTIAZEM HYDROCHLORIDE 120 MG: 120 CAPSULE, COATED, EXTENDED RELEASE ORAL at 08:28

## 2020-07-15 RX ADMIN — METFORMIN HYDROCHLORIDE 1000 MG: 500 TABLET ORAL at 08:28

## 2020-07-15 RX ADMIN — BUDESONIDE 0.5 MG: 0.5 INHALANT RESPIRATORY (INHALATION) at 06:58

## 2020-07-15 RX ADMIN — ESCITALOPRAM 10 MG: 10 TABLET, FILM COATED ORAL at 08:28

## 2020-07-15 RX ADMIN — METFORMIN HYDROCHLORIDE 1000 MG: 500 TABLET ORAL at 17:03

## 2020-07-15 NOTE — PLAN OF CARE
Problem: Patient Care Overview  Goal: Plan of Care Review  7/15/2020 1134 by Gilberto Benson COTA/L  Outcome: Ongoing (interventions implemented as appropriate)  Flowsheets (Taken 7/15/2020 1134)  Progress: improving  Plan of Care Reviewed With: patient  Note:   Pt. Performed ue exs to increase her act. Ran and decrease her need with o2! Homesafety issues reviewed with pt. For safe d/c!  7/15/2020 1134 by Gilberto Benson COTA/L  Reactivated

## 2020-07-15 NOTE — THERAPY TREATMENT NOTE
Acute Care - Occupational Therapy Treatment Note  [facility]     Patient Name: Jessica Gregg  : 1945  MRN: [id number]  Today's Date: 7/15/2020  Onset of Illness/Injury or Date of Surgery: 07/10/20  Date of Referral to OT: 20  Referring Physician: Dr. Parker    Admit Date: 7/10/2020       ICD-10-CM ICD-9-CM   1. Pneumonia due to infectious organism, unspecified laterality, unspecified part of lung J18.9 486   2. Gastrointestinal hemorrhage, unspecified gastrointestinal hemorrhage type K92.2 578.9   3. Decreased activities of daily living (ADL) Z78.9 V49.89   4. Impaired mobility Z74.09 799.89   5. Chronic obstructive pulmonary disease with acute exacerbation (CMS/HCC) J44.1 491.21     Patient Active Problem List   Diagnosis   • Chronic obstructive pulmonary disease with acute exacerbation (CMS/HCC)   • CHF (congestive heart failure) (CMS/[facility])   • Type 2 diabetes mellitus, without long-term current use of insulin (CMS/[facility])   • CAD (coronary artery disease)   • COPD exacerbation (CMS/[facility])   • Acute on chronic respiratory failure with hypoxia and hypercapnia (CMS/[facility])   • Pneumonia due to infectious organism   • Acute blood loss anemia   • Iron deficiency anemia     Past Medical History:   Diagnosis Date   • Asthma    • CHF (congestive heart failure) (CMS/[facility])    • COPD (chronic obstructive pulmonary disease) (CMS/[facility])    • Diabetes mellitus (CMS/[facility])    • Emphysema lung (CMS/[facility])    • Hyperlipidemia    • Hypertension    • Mediastinal lymphadenopathy    • Stroke (CMS/[facility])      Past Surgical History:   Procedure Laterality Date   • CAROTID STENT     • CHOLECYSTECTOMY     • COLONOSCOPY     • ESOPHAGUS SURGERY     • SIGMOIDOSCOPY         Therapy Treatment    Rehabilitation Treatment Summary     Row Name 07/15/20 0902             Treatment Time/Intention    Discipline  occupational therapy assistant  -CJ      Document Type  therapy note (daily note)  -CJ      Subjective Information  complains  of;weakness;fatigue  -      Mode of Treatment  occupational therapy  -CJ      Patient Effort  good  -CJ      Existing Precautions/Restrictions  fall;oxygen therapy device and L/min  -CJ      Recorded by [CJ] Gilberto Benson COTA/L 07/15/20 1133      Row Name 07/15/20 0902             Bed Mobility Assessment/Treatment    Bed Mobility Assessment/Treatment  bed mobility (all) activities  -      [place] Level (Bed Mobility)  supervision  -CJ      Comment (Bed Mobility)  fowlers in bed!  -CJ      Recorded by [CJ] Gilberto Benson COTA/L 07/15/20 1133      Row Name 07/15/20 0902             ADL Assessment/Intervention    38805 - OT Self Care/Mgmt Minutes  15  -CJ      BADL Assessment/Intervention  -- home safety issues!  -CJ      Recorded by [CJ] Gilberto Benson COTA/L 07/15/20 1133      Row Name 07/15/20 0902             Motor Skills Assessment/Interventions    Additional Documentation  Therapeutic Exercise (Group)  -CJ      Recorded by [CJ] Gilberto Benson COTA/L 07/15/20 1133      Row Name 07/15/20 0902             Therapeutic Exercise    Upper Extremity (Therapeutic Exercise)  bicep curl, bilateral;wand exercises  -      Upper Extremity Range of Motion (Therapeutic Exercise)  elbow flexion/extension, bilateral;wrist flexion/extension, bilateral  -      Weight/Resistance (Therapeutic Exercise)  2 pounds;3 pounds  -      Exercise Type (Therapeutic Exercise)  AROM (active range of motion)  -      Position (Therapeutic Exercise)  seated  -CJ      Sets/Reps (Therapeutic Exercise)  1 set x 20 reps!  -      Equipment (Therapeutic Exercise)  dowel blu/wand;free weight, barbell  -      Expected Outcome (Therapeutic Exercise)  facilitate normal movement patterns;improve functional stability;improve functional tolerance, self-care activity;improve motor control;improve neuromuscular control;improve performance, BADLs;improve performance, fine motor coordination skills;improve performance, gait  skills;improve performance, transfer skills;improve postural control;increase active range of motion  -CJ      Recorded by [CJ] Gilberto Benson COTA/L 07/15/20 1133      Row Name 07/15/20 0902             Positioning and Restraints    Pre-Treatment Position  in bed  -CJ      Post Treatment Position  bed  -CJ      In Bed  fowlers;call light within reach;encouraged to call for assist;side rails up x2  -CJ      Recorded by [CJ] Gilberto Benson COTA/L 07/15/20 1133      Row Name 07/15/20 0902             Pain Assessment    Additional Documentation  Pain Scale 2: Word Pre/Post-Treatment (Group)  -CJ      Recorded by [CJ] Gilberto Benson COTA/L 07/15/20 1133      Row Name 07/15/20 0902             Pain Scale: Numbers Pre/Post-Treatment    Pain Scale: Numbers, Pretreatment  0/10 - no pain  -CJ      Pain Scale: Numbers, Post-Treatment  0/10 - no pain  -CJ      Pain Intervention(s)  Repositioned;Rest  -CJ      Recorded by [CJ] Gilberto Benson COTA/L 07/15/20 1133      Row Name 07/15/20 0902             Outcome Summary/Treatment Plan (OT)    Daily Summary of Progress (OT)  progress toward functional goals is good  -      Barriers to Overall Progress (OT)  Fall/o2!  -      Plan for Continued Treatment (OT)  Plan d/c!  -CJ      Recorded by [CJ] Gilberto Benson COTA/ANTIONE 07/15/20 1133        User Key  (r) = Recorded By, (t) = Taken By, (c) = Cosigned By    Initials Name Effective Dates Discipline     Gilberto Benson COTA/L 08/02/16 -  OT             Occupational Therapy Education                 Title: PT OT SLP Therapies (Done)     Topic: Occupational Therapy (Done)     Point: ADL training (Done)     Description:   Instruct learner(s) on proper safety adaptation and remediation techniques during self care or transfers.   Instruct in proper use of assistive devices.              Learning Progress Summary           Patient Acceptance, E,TB, VU,DU,NR by  at 7/15/2020 1133    Comment:  This helder/l  reviewed homesafety with pt. for home d/c, ue exs performed by pt!    Acceptance, E, VU by JHELENE at 7/12/2020 1509   Family Acceptance, E,TB, VU,DU,NR by  at 7/15/2020 1133    Comment:  This pendleton/l reviewed homesafety with pt. for home d/c, ue exs performed by pt!                   Point: Home exercise program (Done)     Description:   Instruct learner(s) on appropriate technique for monitoring, assisting and/or progressing therapeutic exercises/activities.              Learning Progress Summary           Patient Acceptance, E,TB, VU,DU,NR by  at 7/15/2020 1133    Comment:  This pendleton/l reviewed homesafety with pt. for home d/c, ue exs performed by pt!    Acceptance, E,TB, VU,DU,NR by  at 7/14/2020 1458    Comment:  Pt. performed ue exs to increase her act. russell with adl tasks!    Acceptance, E,TB, VU,DU,NR by  at 7/13/2020 1518    Comment:  Pt. performs ue exs to increase her act. russell!    Acceptance, E, VU by SANDRA at 7/12/2020 1509   Family Acceptance, E,TB, VU,DU,NR by  at 7/15/2020 1133    Comment:  This pendleton/l reviewed homesafety with pt. for home d/c, ue exs performed by pt!                   Point: Precautions (Done)     Description:   Instruct learner(s) on prescribed precautions during self-care and functional transfers.              Learning Progress Summary           Patient Acceptance, E,TB, VU,DU,NR by  at 7/15/2020 1133    Comment:  This pendleton/l reviewed homesafety with pt. for home d/c, ue exs performed by pt!    Acceptance, E,TB, VU,DU,NR by  at 7/14/2020 1458    Comment:  Pt. performed ue exs to increase her act. russell with adl tasks!    Acceptance, E,TB, VU,DU,NR by  at 7/13/2020 1518    Comment:  Pt. performs ue exs to increase her act. russell!    Acceptance, E, VU by JJ at 7/12/2020 1509   Family Acceptance, E,TB, VU,DU,NR by  at 7/15/2020 1133    Comment:  This pendleton/l reviewed homesafety with pt. for home d/c, ue exs performed by pt!                   Point: Body mechanics (Done)      Description:   Instruct learner(s) on proper positioning and spine alignment during self-care, functional mobility activities and/or exercises.              Learning Progress Summary           Patient Acceptance, E,TB, VU,DU,NR by  at 7/15/2020 1133    Comment:  This pendleton/l reviewed homesafety with pt. for home d/c, ue exs performed by pt!    Acceptance, E,TB, VU,DU,NR by  at 7/14/2020 1458    Comment:  Pt. performed ue exs to increase her act. russell with adl tasks!    Acceptance, E,TB, VU,DU,NR by  at 7/13/2020 1518    Comment:  Pt. performs ue exs to increase her act. russell!    Acceptance, E, VU by J at 7/12/2020 1509   Family Acceptance, E,TB, VU,DU,NR by  at 7/15/2020 1133    Comment:  This pendleton/l reviewed homesafety with pt. for home d/c, ue exs performed by pt!                               User Key     Initials Effective Dates Name Provider Type Discipline     08/02/16 -  Gilberto Benson PENDLETON/L Occupational Therapy Assistant OT     07/05/20 -  Magdalena Winkler OTR/L Occupational Therapist OT                OT Recommendation and Plan  Outcome Summary/Treatment Plan (OT)  Daily Summary of Progress (OT): progress toward functional goals is good  Barriers to Overall Progress (OT): Fall/o2!  Plan for Continued Treatment (OT): Plan d/c!  Daily Summary of Progress (OT): progress toward functional goals is good  Plan of Care Review  Plan of Care Reviewed With: patient  Plan of Care Reviewed With: patient  Outcome Measures     Row Name 07/15/20 0902 07/14/20 1402 07/13/20 1303       How much help from another is currently needed...    Putting on and taking off regular lower body clothing?  4  -CJ  4  -CJ  4  -CJ    Bathing (including washing, rinsing, and drying)  3  -CJ  3  -CJ  3  -CJ    Toileting (which includes using toilet bed pan or urinal)  3  -CJ  3  -CJ  3  -CJ    Putting on and taking off regular upper body clothing  4  -CJ  4  -CJ  4  -CJ    Taking care of personal grooming (such as brushing  teeth)  4  -  4  -  4  -    Eating meals  4  -  4  -CJ  4  -    AM-PAC 6 Clicks Score (OT)  22  -CJ  22  -CJ  22  -       Functional Assessment    Outcome Measure Options  --  AM-PAC 6 Clicks Daily Activity (OT)  -  AM-PAC 6 Clicks Daily Activity (OT)  -    Row Name 07/12/20 1500             How much help from another is currently needed...    Putting on and taking off regular lower body clothing?  4  -JJ      Bathing (including washing, rinsing, and drying)  3  -JJ      Toileting (which includes using toilet bed pan or urinal)  3  -JJ      Putting on and taking off regular upper body clothing  4  -JJ      Taking care of personal grooming (such as brushing teeth)  4  -JJ      Eating meals  4  -JJ      AM-PAC 6 Clicks Score (OT)  22  -         Functional Assessment    Outcome Measure Options  AM-PAC 6 Clicks Daily Activity (OT)  -        User Key  (r) = Recorded By, (t) = Taken By, (c) = Cosigned By    Initials Name Provider Type    Gilberto Pereira COTA/L Occupational Therapy Assistant    JMagdalena Russo OTR/L Occupational Therapist           Time Calculation:   Time Calculation- OT     Row Name 07/15/20 0902             Time Calculation-     OT Start Time  0902  -      OT Stop Time  0930  -      OT Time Calculation (min)  28 min  -      Total Timed Code Minutes- OT  28 minute(s)  -      TCU Minutes- OT  28 min  -      OT Received On  07/15/20  -      OT Goal Re-Cert Due Date  07/22/20  -         Timed Charges    93620 - OT Self Care/Mgmt Minutes  15  -CJ        User Key  (r) = Recorded By, (t) = Taken By, (c) = Cosigned By    Initials Name Provider Type    Gilberto Pereira COTA/L Occupational Therapy Assistant        Therapy Charges for Today     Code Description Service Date Service Provider Modifiers Qty    18769506723 HC OT THER PROC EA 15 MIN 7/14/2020 Gilberto Benson COTA/L GO 2    32370949644 HC OT SELF CARE/MGMT/TRAIN EA 15 MIN 7/15/2020 Marcelino  ROSEMARY Quinn/L GO 1    55911708610  OT THER PROC EA 15 MIN 7/15/2020 Gilberto Benson COTA/L GO 1               MARILIN Pandey  7/15/2020

## 2020-07-15 NOTE — THERAPY TREATMENT NOTE
Acute Care - Physical Therapy Treatment Note  [facility]     Patient Name: Jessica Gregg  : 1945  MRN: [id number]  Today's Date: 7/15/2020  Onset of Illness/Injury or Date of Surgery: 07/10/20     Referring Physician: Dr. Parker    Admit Date: 7/10/2020    Visit Dx:    ICD-10-CM ICD-9-CM   1. Pneumonia due to infectious organism, unspecified laterality, unspecified part of lung J18.9 486   2. Gastrointestinal hemorrhage, unspecified gastrointestinal hemorrhage type K92.2 578.9   3. Decreased activities of daily living (ADL) Z78.9 V49.89   4. Impaired mobility Z74.09 799.89   5. Chronic obstructive pulmonary disease with acute exacerbation (CMS/HCC) J44.1 491.21     Patient Active Problem List   Diagnosis   • Chronic obstructive pulmonary disease with acute exacerbation (CMS/[facility])   • CHF (congestive heart failure) (CMS/[facility])   • Type 2 diabetes mellitus, without long-term current use of insulin (CMS/[facility])   • CAD (coronary artery disease)   • COPD exacerbation (CMS/[facility])   • Acute on chronic respiratory failure with hypoxia and hypercapnia (CMS/[facility])   • Pneumonia due to infectious organism   • Acute blood loss anemia   • Iron deficiency anemia       Therapy Treatment    Rehabilitation Treatment Summary     Row Name 07/15/20 1503 07/15/20 0902          Treatment Time/Intention    Discipline  physical therapy assistant  -LC  occupational therapy assistant  -CJ     Document Type  therapy note (daily note)  -2  therapy note (daily note)  -CJ     Subjective Information  complains of;dyspnea  -2  complains of;weakness;fatigue  -CJ     Mode of Treatment  physical therapy  -2  occupational therapy  -CJ     Patient Effort  --  good  -CJ     Existing Precautions/Restrictions  fall;oxygen therapy device and L/min  -2  fall;oxygen therapy device and L/min  -CJ     Recorded by [LC] Carmina Leija, PTA 07/15/20 1503  [LC2] Carmina Leija, PTA 07/15/20 1520 [CJ] Gilberto Benson COTA/L 07/15/20 1133     Row Name  07/15/20 1503 07/15/20 0902          Bed Mobility Assessment/Treatment    Bed Mobility Assessment/Treatment  --  bed mobility (all) activities  -     [place] Level (Bed Mobility)  --  supervision  -     Supine-Sit [place] (Bed Mobility)  supervision  -  --     Sit-Supine [place] (Bed Mobility)  supervision  -  --     Assistive Device (Bed Mobility)  head of bed elevated  -  --     Comment (Bed Mobility)  --  fowlers in bed!  -CJ     Recorded by [LC] Carmina Leija, PTA 07/15/20 1520 [CJ] Gilberto Benson COTA/L 07/15/20 1133     Row Name 07/15/20 1503             Sit-Stand Transfer    Sit-Stand [place] (Transfers)  supervision  -      Assistive Device (Sit-Stand Transfers)  walker, front-wheeled  -LC      Recorded by [LC] Carmina Leija, PTA 07/15/20 1520      Row Name 07/15/20 1503             Stand-Sit Transfer    Stand-Sit [place] (Transfers)  supervision  -      Recorded by [LC] Carmina Leija, [facility] 07/15/20 1520      Row Name 07/15/20 1503             Gait/Stairs Assessment/Training    Gait/Stairs Assessment/Training  gait/ambulation independence  -      [place] Level (Gait)  supervision  -      Assistive Device (Gait)  walker, front-wheeled  -      Distance in Feet (Gait)  100 x2  -      Pattern (Gait)  step-through  -      Deviations/Abnormal Patterns (Gait)  gait speed decreased;stride length decreased  -      Bilateral Gait Deviations  forward flexed posture  -      Comment (Gait/Stairs)  seated rest break due to SOA  -LC      Recorded by [LC] Carmina Leija, PTA 07/15/20 1520      Row Name 07/15/20 0902             ADL Assessment/Intervention    58224 - OT Self Care/Mgmt Minutes  15  -CJ      BADL Assessment/Intervention  -- home safety issues!  -CJ      Recorded by [CJ] Gilberto Benson COTA/L 07/15/20 1133      Row Name 07/15/20 0902             Motor Skills Assessment/Interventions    Additional Documentation  Therapeutic Exercise (Group)  -       Recorded by [CJ] Gilberto Benson COTA/L 07/15/20 1133      Row Name 07/15/20 0902             Therapeutic Exercise    Upper Extremity (Therapeutic Exercise)  bicep curl, bilateral;wand exercises  -CJ      Upper Extremity Range of Motion (Therapeutic Exercise)  elbow flexion/extension, bilateral;wrist flexion/extension, bilateral  -CJ      Weight/Resistance (Therapeutic Exercise)  2 pounds;3 pounds  -CJ      Exercise Type (Therapeutic Exercise)  AROM (active range of motion)  -CJ      Position (Therapeutic Exercise)  seated  -CJ      Sets/Reps (Therapeutic Exercise)  1 set x 20 reps!  -CJ      Equipment (Therapeutic Exercise)  dowel blu/wand;free weight, barbell  -CJ      Expected Outcome (Therapeutic Exercise)  facilitate normal movement patterns;improve functional stability;improve functional tolerance, self-care activity;improve motor control;improve neuromuscular control;improve performance, BADLs;improve performance, fine motor coordination skills;improve performance, gait skills;improve performance, transfer skills;improve postural control;increase active range of motion  -CJ      Recorded by [CJ] Gilberto Benson COTA/L 07/15/20 1133      Row Name 07/15/20 1503 07/15/20 0902          Positioning and Restraints    Pre-Treatment Position  in bed  -LC  in bed  -CJ     Post Treatment Position  bed  -  bed  -CJ     In Bed  fowlers;call light within reach;encouraged to call for assist  -LC  fowlers;call light within reach;encouraged to call for assist;side rails up x2  -CJ     Recorded by [] Carmina Leija, PTA 07/15/20 1520 [CJ] Gilberto Benson COTA/L 07/15/20 1133     Row Name 07/15/20 0902             Pain Assessment    Additional Documentation  Pain Scale 2: Word Pre/Post-Treatment (Group)  -CJ      Recorded by [CJ] Gilberto Benson COTA/L 07/15/20 1133      Row Name 07/15/20 1503 07/15/20 0902          Pain Scale: Numbers Pre/Post-Treatment    Pain Scale: Numbers, Pretreatment  0/10 - no  pain  -  0/10 - no pain  -     Pain Scale: Numbers, Post-Treatment  0/10 - no pain  -LC  0/10 - no pain  -     Pain Intervention(s)  Medication (See MAR)  -LC  Repositioned;Rest  -     Recorded by [] Carmina Leija, PTA 07/15/20 1520 [CJ] Gilberto Benson, RILEY/L 07/15/20 1133     Row Name 07/15/20 0902             Outcome Summary/Treatment Plan (OT)    Daily Summary of Progress (OT)  progress toward functional goals is good  -      Barriers to Overall Progress (OT)  Fall/o2!  -CJ      Plan for Continued Treatment (OT)  Plan d/c!  -CJ      Recorded by [CJ] Gilberto Benson RILEY/L 07/15/20 1133        User Key  (r) = Recorded By, (t) = Taken By, (c) = Cosigned By    Initials Name Effective Dates Discipline     Gilberto Benson, RILEY/L 08/02/16 -  OT     Carmina Leija, PTA 10/18/19 -  PT                   Physical Therapy Education                 Title: PT OT SLP Therapies (Done)     Topic: Physical Therapy (Resolved)     Point: Mobility training (Resolved)     Description:   Instruct learner(s) on safety and technique for assisting patient out of bed, chair or wheelchair.  Instruct in the proper use of assistive devices, such as walker, crutches, cane or brace.              Patient Friendly Description:   It's important to get you on your feet again, but we need to do so in a way that is safe for you. Falling has serious consequences, and your personal safety is the most important thing of all.        When it's time to get out of bed, one of us or a family member will sit next to you on the bed to give you support.     If your doctor or nurse tells you to use a walker, crutches, a cane, or a brace, be sure you use it every time you get out of bed, even if you think you don't need it.    Learning Progress Summary           Patient Acceptance, E, VU by SB at 7/13/2020 1102    Comment:  pt edu on pursed lip breathing, POC, benefits of act, d/c plans                   Point: Home exercise program  (Resolved)     Description:   Instruct learner(s) on appropriate technique for monitoring, assisting and/or progressing patient with therapeutic exercises and activities.              Learner Progress:   Not documented in this visit.          Point: Body mechanics (Resolved)     Description:   Instruct learner(s) on proper positioning and spine alignment for patient and/or caregiver during mobility tasks and/or exercises.              Learner Progress:   Not documented in this visit.          Point: Precautions (Resolved)     Description:   Instruct learner(s) on prescribed precautions during mobility and gait tasks              Learning Progress Summary           Patient Acceptance, E, VU by SB at 7/13/2020 1102    Comment:  pt edu on pursed lip breathing, POC, benefits of act, d/c plans                               User Key     Initials Effective Dates Name Provider Type Discipline    SB 10/31/19 -  Pepper Zacarias, PT DPT Physical Therapist PT                PT Recommendation and Plan        Outcome Measures     Row Name 07/15/20 0902 07/14/20 1402 07/13/20 1303       How much help from another is currently needed...    Putting on and taking off regular lower body clothing?  4  -CJ  4  -CJ  4  -CJ    Bathing (including washing, rinsing, and drying)  3  -CJ  3  -CJ  3  -CJ    Toileting (which includes using toilet bed pan or urinal)  3  -CJ  3  -CJ  3  -CJ    Putting on and taking off regular upper body clothing  4  -CJ  4  -CJ  4  -CJ    Taking care of personal grooming (such as brushing teeth)  4  -CJ  4  -CJ  4  -CJ    Eating meals  4  -CJ  4  -CJ  4  -CJ    AM-PAC 6 Clicks Score (OT)  22  -CJ  22  -CJ  22  -CJ       Functional Assessment    Outcome Measure Options  --  AM-PAC 6 Clicks Daily Activity (OT)  -CJ  AM-PAC 6 Clicks Daily Activity (OT)  -      User Key  (r) = Recorded By, (t) = Taken By, (c) = Cosigned By    Initials Name Provider Type    CJ Gilberto Benson COTA/L Occupational Therapy Assistant          Time Calculation:   PT Charges     Row Name 07/15/20 1520             Time Calculation    Start Time  1503  -      Stop Time  1517  -      Time Calculation (min)  14 min  -LC      PT Received On  07/15/20  -         Time Calculation- PT    Total Timed Code Minutes- PT  14 minute(s)  -        User Key  (r) = Recorded By, (t) = Taken By, (c) = Cosigned By    Initials Name Provider Type     Carmina Leija PTA Physical Therapy Assistant        Therapy Charges for Today     Code Description Service Date Service Provider Modifiers Qty    72437620493 HC GAIT TRAINING EA 15 MIN 7/15/2020 Carmina Leija PTA GP 1          PT G-Codes  Outcome Measure Options: AM-PAC 6 Clicks Daily Activity (OT)  AM-PAC 6 Clicks Score (PT): 19  AM-PAC 6 Clicks Score (OT): 22    Carmina Leija PTA  7/15/2020

## 2020-07-15 NOTE — PROGRESS NOTES
Continued Stay Note   [place]     Patient Name: Jessica Gregg  MRN: [id number]  Today's Date: 7/15/2020    Admit Date: 7/10/2020    Discharge Plan     Row Name 07/15/20 1010       Plan    Plan  [facility]    Patient/Family in Agreement with Plan  yes    Final Discharge Disposition Code  06 - home with home health care    Final Note  Found out that pt is already followed by Mirlande Gonzalez from there aware of d/c status/new orders [phone].        Discharge Codes    No documentation.       Expected Discharge Date and Time     Expected Discharge Date Expected Discharge Time    Jul 15, 2020             NATHAN Resendez

## 2020-07-15 NOTE — PLAN OF CARE
Problem: Fall Risk (Adult)  Goal: Absence of Fall  Outcome: Ongoing (interventions implemented as appropriate)  Note:   Patient had a coughing fit at the beginning of the shift.  She said she felt like she had sinus drainage and couldn't get it up.  Once that passed, she had no further problems.  O2 at 4l/hi chelsey.  No c/o pain.  S/st   cont. To monitor.  Call for concerns.

## 2020-07-15 NOTE — DISCHARGE SUMMARY
[facility] Medicine Services  DISCHARGE SUMMARY       Date of Admission: 7/10/2020  Date of Discharge:  7/15/2020  Primary Care Physician: Arsh Burns DO    Presenting Problem/History of Present Illness:  Shortness of breath    Final Discharge Diagnoses:  Active Hospital Problems    Diagnosis   • **Pneumonia due to infectious organism   • Acute blood loss anemia   • Iron deficiency anemia   • Acute on chronic respiratory failure with hypoxia and hypercapnia (CMS/[facility])   • Type 2 diabetes mellitus, without long-term current use of insulin (CMS/[facility])   • CAD (coronary artery disease)   • Chronic obstructive pulmonary disease with acute exacerbation (CMS/[facility])     Consults: None    Procedures Performed: None    Pertinent Test Results:   Lab Results (all)     Procedure Component Value Units Date/Time    POC Glucose Once [[id number]]  (Abnormal) Collected:  07/15/20 0744    Specimen:  Blood Updated:  07/15/20 0755     Glucose 150 mg/dL      Comment: : [id number] Gabriel AshleyMeter ID: [id number]       POC Glucose Once [[id number]]  (Abnormal) Collected:  07/14/20 2033    Specimen:  Blood Updated:  07/14/20 2044     Glucose 302 mg/dL      Comment: : [id number] Kate LangraMeter ID: [id number]       Blood Culture - Blood, Arm, Right [[id number]] Collected:  07/10/20 1740    Specimen:  Blood from Arm, Right Updated:  07/14/20 1800     Blood Culture No growth at 4 days    POC Glucose Once [[id number]]  (Abnormal) Collected:  07/14/20 1534    Specimen:  Blood Updated:  07/14/20 1550     Glucose 152 mg/dL      Comment: : [id number] Gabriel AshleyMeter ID: [id number]       POC Glucose Once [[id number]]  (Abnormal) Collected:  07/14/20 1158    Specimen:  Blood Updated:  07/14/20 1209     Glucose 303 mg/dL      Comment: : [id number] Gabriel AshleyMeter ID: [id number]       Respiratory Culture - Sputum, Cough [[id number]] Collected:  07/12/20 1045    Specimen:  Sputum from Cough  Updated:  07/14/20 0849     Respiratory Culture Scant growth (1+) Normal Respiratory Sander     Gram Stain Greater than 25 WBCs per low power field      Moderate (3+) Epithelial cells per low power field      Rare (1+) Mixed gram positive sander    POC Glucose Once [[id number]]  (Abnormal) Collected:  07/14/20 0743    Specimen:  Blood Updated:  07/14/20 0753     Glucose 210 mg/dL      Comment: : [id number] Gabriel GunterMeter ID: [id number]       POC Glucose Once [[id number]]  (Abnormal) Collected:  07/13/20 1656    Specimen:  Blood Updated:  07/13/20 1734     Glucose 276 mg/dL      Comment: : [id number] Andrea ChristinaMeter ID: [id number]       Hemoglobin A1c [[id number]]  (Abnormal) Collected:  07/13/20 0234    Specimen:  Blood Updated:  07/13/20 1300     Hemoglobin A1C 9.60 %     POC Glucose Once [[id number]]  (Abnormal) Collected:  07/13/20 1148    Specimen:  Blood Updated:  07/13/20 1203     Glucose 261 mg/dL      Comment: : [id number] Andrea ChristinaMeter ID: [id number]       POC Glucose Once [[id number]]  (Abnormal) Collected:  07/13/20 0740    Specimen:  Blood Updated:  07/13/20 0813     Glucose 243 mg/dL      Comment: : [id number] Mendez ChristinaMeter ID: [id number]       Blood Culture - Blood, Arm, Right [[id number]]  (Abnormal) Collected:  07/10/20 1744    Specimen:  Blood from Arm, Right Updated:  07/13/20 0536     Blood Culture Staphylococcus, coagulase negative     Comment: Probable contaminant requires clinical correlation, susceptibility not performed unless requested by physician.          Isolated from Aerobic Bottle     Gram Stain Aerobic Bottle Gram positive cocci in clusters    Basic Metabolic Panel [[id number]]  (Abnormal) Collected:  07/13/20 0234    Specimen:  Blood Updated:  07/13/20 0418     Glucose 251 mg/dL      BUN 18 mg/dL      Creatinine 0.60 mg/dL      Sodium 139 mmol/L      Potassium 4.4 mmol/L      Chloride 98 mmol/L      CO2 30.0 mmol/L      Calcium 9.4 mg/dL      eGFR Non   Amer 98 mL/min/1.73      BUN/Creatinine Ratio 30.0     Anion Gap 11.0 mmol/L     CBC Auto Differential [[id number]]  (Abnormal) Collected:  07/13/20 0234    Specimen:  Blood Updated:  07/13/20 0404     WBC 14.29 10*3/mm3      RBC 3.51 10*6/mm3      Hemoglobin 9.5 g/dL      Hematocrit 30.6 %      MCV 87.2 fL      MCH 27.1 pg      MCHC 31.0 g/dL      RDW 15.0 %      RDW-SD 48.0 fl      MPV 10.5 fL      Platelets 282 10*3/mm3      Neutrophil % 82.9 %      Lymphocyte % 8.6 %      Monocyte % 7.1 %      Eosinophil % 0.3 %      Basophil % 0.4 %      Immature Grans % 0.7 %      Neutrophils, Absolute 11.85 10*3/mm3      Lymphocytes, Absolute 1.23 10*3/mm3      Monocytes, Absolute 1.01 10*3/mm3      Eosinophils, Absolute 0.04 10*3/mm3      Basophils, Absolute 0.06 10*3/mm3      Immature Grans, Absolute 0.10 10*3/mm3      nRBC 0.0 /100 WBC     POC Glucose Once [[id number]]  (Abnormal) Collected:  07/1945    Specimen:  Blood Updated:  07/12/20 1956     Glucose 354 mg/dL      Comment: : [id number] Kate Peoples ID: [id number]       POC Glucose Once [[id number]]  (Abnormal) Collected:  07/12/20 1639    Specimen:  Blood Updated:  07/12/20 1656     Glucose 339 mg/dL      Comment: : [id number] Rick EuraisaMeter ID: [id number]       POC Glucose Once [[id number]]  (Abnormal) Collected:  07/12/20 1102    Specimen:  Blood Updated:  07/12/20 1121     Glucose 356 mg/dL      Comment: : [id number] Rick EuraisaMeter ID: [id number]       POC Glucose Once [[id number]]  (Abnormal) Collected:  07/12/20 0748    Specimen:  Blood Updated:  07/12/20 0816     Glucose 259 mg/dL      Comment: : [id number] Rick EuraisaMeter ID: [id number]       Basic Metabolic Panel [[id number]]  (Abnormal) Collected:  07/12/20 0209    Specimen:  Blood Updated:  07/12/20 0338     Glucose 314 mg/dL      BUN 17 mg/dL      Creatinine 0.50 mg/dL      Sodium 136 mmol/L      Potassium 4.6 mmol/L      Chloride 94 mmol/L      CO2 30.0 mmol/L       Calcium 10.0 mg/dL      eGFR Non African Amer 121 mL/min/1.73      BUN/Creatinine Ratio 34.0     Anion Gap 12.0 mmol/L     CBC Auto Differential [[id number]]  (Abnormal) Collected:  07/12/20 0209    Specimen:  Blood Updated:  07/12/20 0324     WBC 8.90 10*3/mm3      RBC 4.02 10*6/mm3      Hemoglobin 10.8 g/dL      Hematocrit 34.8 %      MCV 86.6 fL      MCH 26.9 pg      MCHC 31.0 g/dL      RDW 14.9 %      RDW-SD 47.5 fl      MPV 10.1 fL      Platelets 283 10*3/mm3      Neutrophil % 88.9 %      Lymphocyte % 6.6 %      Monocyte % 3.3 %      Eosinophil % 0.0 %      Basophil % 0.6 %      Immature Grans % 0.6 %      Neutrophils, Absolute 7.92 10*3/mm3      Lymphocytes, Absolute 0.59 10*3/mm3      Monocytes, Absolute 0.29 10*3/mm3      Eosinophils, Absolute 0.00 10*3/mm3      Basophils, Absolute 0.05 10*3/mm3      Immature Grans, Absolute 0.05 10*3/mm3      nRBC 0.0 /100 WBC     Blood Culture ID, PCR - Blood, Arm, Right [[id number]]  (Abnormal) Collected:  07/10/20 1744    Specimen:  Blood from Arm, Right Updated:  07/12/20 0324     BCID, PCR Staphylococcus spp, not aureus. Identification by BCID PCR.    POC Glucose Once [[id number]]  (Abnormal) Collected:  07/12/20 0023    Specimen:  Blood Updated:  07/12/20 0035     Glucose 325 mg/dL      Comment: : [id number] Kate Peoples ID: [id number]       POC Glucose Once [[id number]]  (Abnormal) Collected:  07/11/20 2018    Specimen:  Blood Updated:  07/11/20 2029     Glucose 198 mg/dL      Comment: : [id number] Ck Leung ID: [id number]       POC Glucose Once [[id number]]  (Abnormal) Collected:  07/11/20 1644    Specimen:  Blood Updated:  07/11/20 1656     Glucose 342 mg/dL      Comment: : [id number] Gabriel Pichardo ID: [id number]       POC Glucose Once [[id number]]  (Normal) Collected:  07/11/20 1200    Specimen:  Blood Updated:  07/11/20 1211     Glucose 104 mg/dL      Comment: : [id number] Gabriel La LuzMeter ID: [id number]       MRSA Screen,  PCR (Inpatient) - Swab, Nares [[id number]]  (Normal) Collected:  07/11/20 0858    Specimen:  Swab from Nares Updated:  07/11/20 1024     MRSA PCR No MRSA Detected    S. Pneumo Ag Urine or CSF - Urine, Urine, Clean Catch [[id number]]  (Normal) Collected:  07/11/20 0954    Specimen:  Urine, Clean Catch Updated:  07/11/20 1021     Strep Pneumo Ag Negative    Legionella Antigen, Urine - Urine, Urine, Clean Catch [[id number]]  (Normal) Collected:  07/11/20 0954    Specimen:  Urine, Clean Catch Updated:  07/11/20 1021     LEGIONELLA ANTIGEN, URINE Negative    POC Glucose Once [[id number]]  (Abnormal) Collected:  07/11/20 0751    Specimen:  Blood Updated:  07/11/20 0802     Glucose 202 mg/dL      Comment: : [id number] Gabriel AshleyMeter ID: [id number]       Basic Metabolic Panel [[id number]]  (Abnormal) Collected:  07/11/20 0526    Specimen:  Blood Updated:  07/11/20 0600     Glucose 238 mg/dL      BUN 13 mg/dL      Creatinine 0.68 mg/dL      Sodium 140 mmol/L      Potassium 4.0 mmol/L      Chloride 98 mmol/L      CO2 32.0 mmol/L      Calcium 9.2 mg/dL      eGFR Non African Amer 85 mL/min/1.73      BUN/Creatinine Ratio 19.1     Anion Gap 10.0 mmol/L     CBC Auto Differential [[id number]]  (Abnormal) Collected:  07/11/20 0526    Specimen:  Blood Updated:  07/11/20 0544     WBC 11.73 10*3/mm3      RBC 3.70 10*6/mm3      Hemoglobin 10.0 g/dL      Hematocrit 32.3 %      MCV 87.3 fL      MCH 27.0 pg      MCHC 31.0 g/dL      RDW 15.0 %      RDW-SD 48.1 fl      MPV 10.1 fL      Platelets 269 10*3/mm3      Neutrophil % 73.4 %      Lymphocyte % 10.9 %      Monocyte % 12.4 %      Eosinophil % 1.1 %      Basophil % 0.8 %      Immature Grans % 1.4 %      Neutrophils, Absolute 8.60 10*3/mm3      Lymphocytes, Absolute 1.28 10*3/mm3      Monocytes, Absolute 1.46 10*3/mm3      Eosinophils, Absolute 0.13 10*3/mm3      Basophils, Absolute 0.09 10*3/mm3      Immature Grans, Absolute 0.17 10*3/mm3      nRBC 0.0 /100 WBC     Iron Profile  [[id number]]  (Abnormal) Collected:  07/10/20 1744    Specimen:  Blood Updated:  07/11/20 0319     Iron 35 mcg/dL      Iron Saturation 12 %      Transferrin 190 mg/dL      TIBC 283 mcg/dL     Ferritin [[id number]]  (Abnormal) Collected:  07/10/20 1744    Specimen:  Blood Updated:  07/11/20 0319     Ferritin 187.40 ng/mL     Narrative:       Results may be falsely decreased if patient taking Biotin.      Reticulocytes [[id number]]  (Normal) Collected:  07/10/20 1744    Specimen:  Blood Updated:  07/11/20 0226     Reticulocyte % 1.46 %      Reticulocyte Absolute 0.0580 10*6/mm3     POC Occult Blood Stool [[id number]]  (Abnormal) Collected:  07/10/20 2011    Specimen:  Stool Updated:  07/10/20 2011     Fecal Occult Blood Positive     Lot Number \169\     Expiration Date \11/30/2020\     DEVELOPER LOT NUMBER \169\     DEVELOPER EXPIRATION DATE \11/30/2020\     Positive Control Positive     Negative Control Negative    D-dimer, Quantitative [[id number]]  (Abnormal) Collected:  07/10/20 1744    Specimen:  Blood Updated:  07/10/20 1834     D-Dimer, Quantitative 0.81 mg/L (FEU)     Narrative:       COVID-19, ABBOTT IN-HOUSE,NP Swab (NO TRANSPORT MEDIA) 2 HR TAT - Swab, Nasopharynx [[id number]]  (Normal) Collected:  07/10/20 1735    Specimen:  Swab from Nasopharynx Updated:  07/10/20 1821     COVID19 Not Detected    Narrative:       Comprehensive Metabolic Panel [[id number]]  (Abnormal) Collected:  07/10/20 1744    Specimen:  Blood Updated:  07/10/20 1813     Glucose 289 mg/dL      BUN 16 mg/dL      Creatinine 0.49 mg/dL      Sodium 137 mmol/L      Potassium 4.3 mmol/L      Chloride 96 mmol/L      CO2 28.0 mmol/L      Calcium 9.5 mg/dL      Total Protein 6.3 g/dL      Albumin 3.40 g/dL      ALT (SGPT) 9 U/L      AST (SGOT) 14 U/L      Alkaline Phosphatase 76 U/L      Total Bilirubin 0.2 mg/dL      eGFR Non African Amer 123 mL/min/1.73      Globulin 2.9 gm/dL      A/G Ratio 1.2 g/dL      BUN/Creatinine Ratio 32.7     Anion Gap 13.0  mmol/L     Troponin [[id number]]  (Normal) Collected:  07/10/20 1744    Specimen:  Blood Updated:  07/10/20 1811     Troponin T <0.010 ng/mL     BNP [[id number]]  (Normal) Collected:  07/10/20 1744    Specimen:  Blood Updated:  07/10/20 1810     proBNP 217.0 pg/mL     Lactic Acid, Plasma [[id number]]  (Normal) Collected:  07/10/20 1744    Specimen:  Blood Updated:  07/10/20 1809     Lactate 2.0 mmol/L     CBC Auto Differential [[id number]]  (Abnormal) Collected:  07/10/20 1744    Specimen:  Blood Updated:  07/10/20 1754     WBC 13.85 10*3/mm3      RBC 3.92 10*6/mm3      Hemoglobin 10.6 g/dL      Hematocrit 33.9 %      MCV 86.5 fL      MCH 27.0 pg      MCHC 31.3 g/dL      RDW 15.1 %      RDW-SD 47.8 fl      MPV 9.7 fL      Platelets 292 10*3/mm3      Neutrophil % 79.1 %      Lymphocyte % 8.7 %      Monocyte % 10.0 %      Eosinophil % 0.6 %      Basophil % 0.5 %      Immature Grans % 1.1 %      Neutrophils, Absolute 10.94 10*3/mm3      Lymphocytes, Absolute 1.21 10*3/mm3      Monocytes, Absolute 1.39 10*3/mm3      Eosinophils, Absolute 0.09 10*3/mm3      Basophils, Absolute 0.07 10*3/mm3      Immature Grans, Absolute 0.15 10*3/mm3      nRBC 0.0 /100 WBC     Blood Gas, Arterial [[id number]]  (Abnormal) Collected:  07/10/20 1740    Specimen:  Arterial Blood Updated:  07/10/20 1750     Site Right Brachial     Aneesh's Test N/A     pH, Arterial 7.432 pH units      pCO2, Arterial 48.3 mm Hg      Comment: 83 Value above reference range        pO2, Arterial 46.7 mm Hg      Comment: 85 Value below critical limit        HCO3, Arterial 32.2 mmol/L      Comment: 83 Value above reference range        Base Excess, Arterial 6.9 mmol/L      Comment: 83 Value above reference range        O2 Saturation, Arterial 81.2 %      Comment: 84 Value below reference range        Temperature 37.0 C      Barometric Pressure for Blood Gas 749 mmHg      Modality Nasal Cannula     Flow Rate 4.0 lpm      Ventilator Mode NA     Notified Who DR PASCUAL      Notified By [id number]     Notified Time 07/10/2020 17:57     Collected by [id number]     Comment: Meter: [id number]     :  [id number]        pCO2, Temperature Corrected 48.3 mm Hg      pH, Temp Corrected 7.432 pH Units      pO2, Temperature Corrected 46.7 mm Hg         Imaging Results (All)     Procedure Component Value Units Date/Time    CT Angiogram Chest [[id number]] Collected:  07/10/20 2123     Updated:  07/10/20 2130    Narrative:       EXAMINATION:  CT ANGIOGRAM CHEST-  7/10/2020 8:57 PM CDT     HISTORY: Shortness of breath. Elevated d-dimer. Left lower lobe  infiltrate on x-ray.     COMPARISON : No comparison study.     DLP: 250 mGy-cm. Automated dosage control was utilized.     TECHNIQUE: CT angio was performed of the chest with contrast. Coronal,  sagittal and 3-D reconstruction were performed.     MEDIASTINUM, HEART AND VASCULAR STRUCTURES: There is atheromatous  disease of the thoracic aorta and coronary arteries. There is no CT  evidence of pulmonary embolus. There are multiple mediastinal lymph  nodes that are borderline in size. The largest has a short axis diameter  of 1.3 cm. The heart is normal in size.     LUNGS: The lungs are emphysematous. There is fairly dense airspace  consolidation in the left lower lobe. There is mild dependent infiltrate  in the right lower lobe likely due to atelectasis. There are small  peripheral infiltrates in the left upper lobe.     UPPER ABDOMEN: No acute findings.     BONES: There are degenerative changes of the spine. No acute bony  abnormality is seen.       Impression:       1. No CT evidence of pulmonary embolus.  2. CT confirms infiltrates in the left lung, most significant in the  left lower lobe. This is likely due to pneumonia. There is mild  dependent atelectasis in the right lung base.  3. Centrilobular emphysema.  4. Mediastinal lymphadenopathy, likely reactive.     The full report of this exam was immediately signed and available to the  emergency  room. The patient is currently in the emergency room.    This report was finalized on 07/10/2020 21:27 by Dr. Pete Jean-Baptiste MD.    XR Chest 1 View [[id number]] Collected:  07/10/20 1842     Updated:  07/10/20 1846    Narrative:       EXAMINATION:  XR CHEST 1 VW-  7/10/2020 6:36 PM CDT     HISTORY: CHF/COPD Protocol     COMPARISON: 05/31/2020.     FINDINGS:  There is patchy infiltrate in the left lung base. The right  lung is essentially clear. Emphysematous changes are noted. Bronchial  wall thickening appears stable. Heart size is normal. Thoracic aorta is  atheromatous. There is a subacute-chronic left humerus fracture.       Impression:       1. New infiltrate in the left lung base, worrisome for pneumonia.  2. Emphysema.        This report was finalized on 07/10/2020 18:42 by Dr. Pete Jean-Baptiste MD.        History of Present Illness on Day of Discharge: Patient states she had a coughing fit last night but is feeling better this morning.  She essentially feels she is at her respiratory baseline.  She is tolerating her home setting of oxygen.     Hospital Course:  Ms. Gregg is a 74-year-old  female who follows. Dr. Arsh Burns for primary care.  She has a medical history significant for chronic obstructive pulmonary disease, hypertension, chronic respiratory failure with hypoxia and hypercapnia requiring continuous oxygen, and diabetes mellitus type 2.  The patient presented to the [facility] emergency department on 7/10/2020 with complaints of shortness of breath.  Her oxygen level at home has been running lower as well.  In the emergency department, ABG showed pH of 7.43, PCO2 48, and PO2 of 46.  White blood cell count was elevated at 13,000.  Laboratory work-up showed an elevated d-dimer, which prompted CT angiogram of the chest.  This showed infiltrate in the left lung, most significant in the left lower lobe.  Likely due to pneumonia.  There is also mediastinal lymphadenopathy,  likely reactive.  The patient was admitted to the hospitalist service for further evaluation and management.    The patient was given Rocephin and azithromycin originally.  She had a recent hospitalization in May/[place], antibiotic therapy was changed to vancomycin and cefepime to cover healthcare associated pathogens.  Urinary antigens for strep and Legionella were negative.  MRSA nasal swab was negative.  Antibiotic therapy was de-escalated to cefepime monotherapy, and she has now been converted to oral Omnicef.  She will complete a total of 7 days of therapy.  She did have one blood culture positive for a coagulase-negative staph, which is felt to be a contaminant.  Respiratory culture shows growth of normal respiratory sander.  The patient's supplemental oxygen was increased on admission to high flow.  This has slowly been weaned, and she is tolerating her home setting of oxygen.  Patient has also been treated for acute exacerbation of chronic obstructive pulmonary disease.  She has been given DuoNeb breathing treatments, Mucinex, and pulmonary toileting with incentive spirometry.  She has been placed on a low-dose of prednisone, which will be tapered.    The patient's blood glucoses have been running in the 200-300 range.  Her hemoglobin A1c was checked, and is found to be 9.6%.  Approximately 1 year ago this was 7.1%.  We will increase her metformin to 1000 mg twice daily.  We have also placed her on low-dose Levemir nightly.  She has met with the diabetic educator, does feel comfortable with insulin administration.    Of note, the patient's anemia on admission had shown some worsening compared to her last admission.  Her hemoglobin was 12 on 6/4, 10.6 on admission, and 9.6 when last checked.  Her occult blood stool was found to be positive, but she was not felt to have an acute bleed.  She has never had a colonoscopy.  If felt necessary, this can be arranged by her primary care provider.    Overall, the patient  "is hemodynamically stable and appropriate for discharge home today.  We will order home health services for skilled nursing, and physical/occupational therapy.  She will follow-up with her primary care provider in 1 week.    Condition on Discharge:  Medically stable    Physical Exam on Discharge:  /64 (BP Location: Right arm, Patient Position: Lying)   Pulse 89   Temp 98.1 °F (36.7 °C) (Oral)   Resp 16   Ht 152.4 cm (60\")   Wt 55.9 kg (123 lb 3.2 oz)   SpO2 97%   BMI 24.06 kg/m²   Physical Exam  Constitutional: She is oriented to person, place, and time. She appears well-developed and well-nourished. No distress.   HENT:   Head: Normocephalic and atraumatic.   Neck: Normal range of motion. Neck supple. No JVD present. No tracheal deviation present.   Cardiovascular: Regular rhythm and intact distal pulses. Exam reveals no gallop and no friction rub.   Sinus-sinus tach   Pulmonary/Chest: She has no wheezes. She has no rales.   Grossly diminished throughout.   Abdominal: Soft. Bowel sounds are normal. She exhibits no distension. There is no tenderness. There is no guarding.   Musculoskeletal: Normal range of motion. She exhibits no edema or tenderness.   Neurological: She is alert and oriented to person, place, and time. No cranial nerve deficit.   Skin: Skin is warm and dry. No rash noted. No erythema.   Psychiatric: She has a normal mood and affect. Her behavior is normal. Judgment and thought content normal.   Vitals reviewed.    [place]charge Dis[place]  [place] [facility] [place]charge Medications:     Discharge Medications      New Medications      Instructions Start Date   cefdinir 300 MG capsule  Commonly known as:  OMNICEF   300 mg, Oral, Every 12 Hours Scheduled      insulin detemir 100 UNIT/ML injection  Commonly known as:  LEVEMIR   10 Units, Subcutaneous, Nightly      Insulin Pen Needle 31G X 6 MM misc   1 each, Subcutaneous, Nightly      melatonin 5 MG tablet tablet   5 mg, Oral, " Nightly PRN      predniSONE 10 MG tablet  Commonly known as:  DELTASONE   10 mg, Oral, Daily With Breakfast   Start Date:  July 16, 2020        Changes to Medications      Instructions Start Date   metFORMIN 1000 MG tablet  Commonly known as:  GLUCOPHAGE  What changed:    · medication strength  · how much to take   1,000 mg, Oral, 2 Times Daily With Meals         Continue These Medications      Instructions Start Date   arformoterol 15 MCG/2ML nebulizer solution  Commonly known as:  BROVANA   15 mcg, Nebulization, 2 Times Daily - RT      atorvastatin 80 MG tablet  Commonly known as:  LIPITOR   80 mg, Oral, Nightly      benzonatate 200 MG capsule  Commonly known as:  TESSALON   200 mg, Oral, 3 Times Daily PRN      budesonide 0.5 MG/2ML nebulizer solution  Commonly known as:  PULMICORT   0.5 mg, Nebulization, 2 Times Daily      clopidogrel 75 MG tablet  Commonly known as:  PLAVIX   75 mg, Oral, Daily      dilTIAZem  MG 24 hr capsule  Commonly known as:  CARDIZEM CD   120 mg, Oral, Daily      docusate sodium 100 MG capsule  Commonly known as:  COLACE   100 mg, Oral, 2 Times Daily PRN      escitalopram 10 MG tablet  Commonly known as:  LEXAPRO   10 mg, Oral, Daily      ferrous sulfate 325 (65 FE) MG tablet   325 mg, Oral, 2 Times Daily      fluticasone 50 MCG/ACT nasal spray  Commonly known as:  FLONASE   2 sprays, Nasal, Daily      guaiFENesin 600 MG 12 hr tablet  Commonly known as:  MUCINEX   1,200 mg, Oral, 2 Times Daily      hydrOXYzine 25 MG tablet  Commonly known as:  ATARAX   25 mg, Oral, Every 8 Hours PRN      ipratropium-albuterol 0.5-2.5 mg/3 ml nebulizer  Commonly known as:  DUO-NEB   3 mL, Nebulization, 4 Times Daily - RT      pantoprazole 40 MG EC tablet  Commonly known as:  PROTONIX   40 mg, Oral, Daily      tiotropium 18 MCG per inhalation capsule  Commonly known as:  SPIRIVA   1 capsule, Inhalation, Daily - RT           Discharge Diet:   Diet Instructions     Diet: Cardiac, Consistent Carbohydrate;  Thin      Discharge Diet:   Cardiac  Consistent Carbohydrate       Fluid Consistency:  Thin        Activity at Discharge:   Activity Instructions     Activity as Tolerated          Discharge Care Plan/Instructions:   1.  Return for any acute or worsening symptoms  2.  New medication Levemir.  Insulin pen needles prescribed.  Patient has met with diabetic educator.  3.  Complete course of prednisone and Omnicef  4.  Home health services for skilled nursing, physical/occupational therapy    Follow-up Appointments:   1.  Dr. Burns in 1 week  Future Appointments   Date Time Provider Department Center   9/23/2020  2:00 PM Arsh Burns,  [facility] None     Test Results Pending at Discharge: We will follow blood cultures to completion.    Leatha Mobley, TERRENCE  07/15/20  08:42    Time: 40 minutes

## 2020-07-16 ENCOUNTER — TRANSITIONAL CARE MANAGEMENT TELEPHONE ENCOUNTER (OUTPATIENT)
Dept: CALL CENTER | Facility: HOSPITAL | Age: 75
End: 2020-07-16

## 2020-07-16 RX ORDER — IPRATROPIUM BROMIDE AND ALBUTEROL SULFATE 2.5; .5 MG/3ML; MG/3ML
3 SOLUTION RESPIRATORY (INHALATION)
Qty: 360 ML | Refills: 2 | Status: SHIPPED | OUTPATIENT
Start: 2020-07-16

## 2020-07-16 NOTE — OUTREACH NOTE
Call Center TCM Note      Responses   [facility] patient discharged from?  [place]   COVID-19 Test Status  Negative   Does the patient have one of the following disease processes/diagnoses(primary or secondary)?  COPD/Pneumonia   Was the primary reason for admission:  COPD exacerbation   TCM attempt successful?  No   Unsuccessful attempts  Attempt 1          Helen Canchola RN    7/16/2020, 16:17

## 2020-07-16 NOTE — THERAPY DISCHARGE NOTE
Acute Care - Occupational Therapy Discharge Summary  [facility]     Patient Name: Jessica Gregg  : 1945  MRN: [id number]    Today's Date: 2020  Onset of Illness/Injury or Date of Surgery: 07/10/20    Date of Referral to OT: 20  Referring Physician: Dr. Parker      Admit Date: 7/10/2020        OT Recommendation and Plan    Visit Dx:    ICD-10-CM ICD-9-CM   1. Pneumonia due to infectious organism, unspecified laterality, unspecified part of lung J18.9 486   2. Gastrointestinal hemorrhage, unspecified gastrointestinal hemorrhage type K92.2 578.9   3. Decreased activities of daily living (ADL) Z78.9 V49.89   4. Impaired mobility Z74.09 799.89   5. Chronic obstructive pulmonary disease with acute exacerbation (CMS/HCC) J44.1 491.21               Rehab Goal Summary     Row Name 20 0835             Bathing Goal 1 (OT)    Activity/Assistive Device (Bathing Goal 1, OT)  bathing skills, all  -MT      [place] Level/Cues Needed (Bathing Goal 1, OT)  conditional independence  -MT      Time Frame (Bathing Goal 1, OT)  long term goal (LTG);by discharge  -MT      Progress/Outcomes (Bathing Goal 1, OT)  goal not met  -MT         Toileting Goal 1 (OT)    Activity/Device (Toileting Goal 1, OT)  toileting skills, all;commode  -MT      [place] Level/Cues Needed (Toileting Goal 1, OT)  independent  -MT      Time Frame (Toileting Goal 1, OT)  long term goal (LTG);by discharge  -MT      Progress/Outcome (Toileting Goal 1, OT)  goal not met  -MT          Activity Tolerance Goal 1 (OT)    Activity Tolerance Goal 1 (OT)  Pt will complete morning ADL routine with no recovery periods in standing to address decreased activity tolerance   -MT      Activity Level (Endurance Goal 1, OT)  10 min activity;O2 sat >/ equal to 88%  -MT      Time Frame (Activity Tolerance Goal 1, OT)  long term goal (LTG)  -MT      Progress/Outcome (Activity Tolerance Goal 1, OT)  goal not met  -MT        User Key  (r) = Recorded By,  (t) = Taken By, (c) = Cosigned By    Initials Name Provider Type [facility] Cony Paz COTA/L Occupational Therapy Assistant OT          Outcome Measures     Row Name 07/15/20 0902 07/14/20 1402 07/13/20 1303       How much help from another is currently needed...    Putting on and taking off regular lower body clothing?  4  -CJ  4  -CJ  4  -CJ    Bathing (including washing, rinsing, and drying)  3  -CJ  3  -CJ  3  -CJ    Toileting (which includes using toilet bed pan or urinal)  3  -CJ  3  -CJ  3  -CJ    Putting on and taking off regular upper body clothing  4  -CJ  4  -CJ  4  -CJ    Taking care of personal grooming (such as brushing teeth)  4  -CJ  4  -CJ  4  -CJ    Eating meals  4  -CJ  4  -CJ  4  -CJ    AM-PAC 6 Clicks Score (OT)  22  -CJ  22  -CJ  22  -       Functional Assessment    Outcome Measure Options  --  AM-PAC 6 Clicks Daily Activity (OT)  -  AM-PAC 6 Clicks Daily Activity (OT)  -      User Key  (r) = Recorded By, (t) = Taken By, (c) = Cosigned By    Initials Name Provider Type    CJ Gilberto Benson COTA/L Occupational Therapy Assistant          Therapy Suggested Charges     Code   Minutes Charges    24747 (CPT®) Hc Ot Neuromusc Re Education Ea 15 Min      37987 (CPT®) Hc Ot Ther Proc Ea 15 Min      77783 (CPT®) Hc Ot Therapeutic Act Ea 15 Min      59616 (CPT®) Hc Ot Manual Therapy Ea 15 Min      05823 (CPT®) Hc Ot Iontophoresis Ea 15 Min      78268 (CPT®) Hc Ot Elec Stim Ea-Per 15 Min      29945 (CPT®) Hc Ot Ultrasound Ea 15 Min      26423 (CPT®) Hc Ot Self Care/Mgmt/Train Ea 15 Min 15 1    Total  15 1              OT Discharge Summary  Reason for Discharge: Discharge from facility  Outcomes Achieved: Refer to plan of care for updates on goals achieved  Discharge Destination: Home with home health      MARILIN Farmer  7/16/2020

## 2020-07-16 NOTE — OUTREACH NOTE
Prep Survey      Responses   [facility] from?  [place]   Is LACE score < 7 ?  No   Eligibility  [facility]   Hospital  [place]   Date of Admission  07/10/20   Date of Discharge  07/15/20   Discharge Disposition  Home-Health Care S[facility]   Discharge diagnosis  PNA   COVID-19 Test Status  Negative   Does the patient have one of the following disease processes/diagnoses(primary or secondary)?  COPD/Pneumonia   Does the patient have Home health ordered?  Yes   What is the Home health agency?   [facility]   Is there a DME ordered?  No   Prep survey completed?  Yes          Kiara Melendrez RN

## 2020-07-16 NOTE — PAYOR COMM NOTE
"DC HOME 7-15-20  [id number]    Jessica Gregg (74 y.o. Female)     Date of Birth Social Security Number Address Home Phone MRN    1945  [address] [address] [phone] [id number]    Christianity Marital Status          Buddhism        Admission Date Admission Type Admitting Provider Attending Provider Department, Room/Bed    7/10/20 Emergency Aubrey Parker MD  [facility] 4B, 444/1    Discharge Date Discharge Disposition Discharge Destination        7/15/2020 [place] [facility]              Attending Provider:  (none)   Allergies:  Tramadol, Latex    Isolation:  None   Infection:  None   Code Status:  Prior    Ht:  152.4 cm (60\")   Wt:  55.9 kg (123 lb 3.2 oz)    Admission Cmt:  None   Principal Problem:  Pneumonia due to infectious organism [J18.9]                 Active Insurance as of 7/10/2020     Primary Coverage     Payor Plan Insurance Group Employer/Plan Group    HUMANA MEDICARE REPLACEMENT HUMANA MEDICARE REPLACEMENT [id number]     Payor Plan Address Payor Plan Phone Number Payor Plan Fax Number Effective Dates    [address] [address] [phone]  10/1/2018 - None Entered    Formerly [facility] [id number]       Subscriber Name Subscriber Birth Date Member ID       JESSICA GREGG 1945 [id number]                 Emergency Contacts      (Rel.) Home Phone Work Phone Mobile Phone    Mariaa Pitts (Sister) [phone] -- [phone]            Insurance Information                HUMANA MEDICARE REPLACEMENT/HUMANA MEDICARE REPLACEMENT Phone: [phone]    Subscriber: Jessica Gregg Subscriber#: [id number]    Group#: [id number] Precert#:              Discharge Summary      Leatha Mobley APRN at 07/15/20 0842     Attestation signed by Aubrey Parker MD at 07/15/20 1531    I personally evaluated and examined the patient in conjunction with TERRENCE SANCHEZ and agree with the assessment, treatment plan, and disposition of the patient as recorded " by her. My history, exam, and further recommendations are:     S:  Doing ok, afebrile, no SOA, tolerating room air    O:  CVS RRR    A:  PNA    P:  ok for d/c on PO abx, PO Steroids      Electronically signed by Aubrey Parker MD, 07/15/20, 3:30 PM.                        [facility] Medicine Services  DISCHARGE SUMMARY       Date of Admission: 7/10/2020  Date of Discharge:  7/15/2020  Primary Care Physician: Arsh Burns DO    Presenting Problem/History of Present Illness:  Shortness of breath    Final Discharge Diagnoses:  Active Hospital Problems    Diagnosis   • **Pneumonia due to infectious organism   • Acute blood loss anemia   • Iron deficiency anemia   • Acute on chronic respiratory failure with hypoxia and hypercapnia (CMS/HCC)   • Type 2 diabetes mellitus, without long-term current use of insulin (CMS/[facility])   • CAD (coronary artery disease)   • Chronic obstructive pulmonary disease with acute exacerbation (CMS/[facility])     Consults: None    Procedures Performed: None    Pertinent Test Results:   Lab Results (all)     Procedure Component Value Units Date/Time    POC Glucose Once [[id number]]  (Abnormal) Collected:  07/15/20 0744    Specimen:  Blood Updated:  07/15/20 0755     Glucose 150 mg/dL      Comment: : [id number] Gabriel AshleyMeter ID: [id number]       POC Glucose Once [[id number]]  (Abnormal) Collected:  07/14/20 2033    Specimen:  Blood Updated:  07/14/20 2044     Glucose 302 mg/dL      Comment: : [id number] Kate LangraMpiter ID: [id number]       Blood Culture - Blood, Arm, Right [[id number]] Collected:  07/10/20 1740    Specimen:  Blood from Arm, Right Updated:  07/14/20 1800     Blood Culture No growth at 4 days    POC Glucose Once [[id number]]  (Abnormal) Collected:  07/14/20 1534    Specimen:  Blood Updated:  07/14/20 1550     Glucose 152 mg/dL      Comment: : [id number] Gabriel AshleyMeter ID: [id number]       POC Glucose Once [[id number]]  (Abnormal)  Collected:  07/14/20 1158    Specimen:  Blood Updated:  07/14/20 1209     Glucose 303 mg/dL      Comment: : [id number] Gabriel AshleyMeter ID: [id number]       Respiratory Culture - Sputum, Cough [[id number]] Collected:  07/12/20 1045    Specimen:  Sputum from Cough Updated:  07/14/20 0849     Respiratory Culture Scant growth (1+) Normal Respiratory Sander     Gram Stain Greater than 25 WBCs per low power field      Moderate (3+) Epithelial cells per low power field      Rare (1+) Mixed gram positive sander    POC Glucose Once [[id number]]  (Abnormal) Collected:  07/14/20 0743    Specimen:  Blood Updated:  07/14/20 0753     Glucose 210 mg/dL      Comment: : [id number] Gabriel AshleyMeter ID: [id number]       POC Glucose Once [[id number]]  (Abnormal) Collected:  07/13/20 1656    Specimen:  Blood Updated:  07/13/20 1734     Glucose 276 mg/dL      Comment: : [id number] Andrea Atomic ReachinaMeter ID: [id number]       Hemoglobin A1c [[id number]]  (Abnormal) Collected:  07/13/20 0234    Specimen:  Blood Updated:  07/13/20 1300     Hemoglobin A1C 9.60 %     POC Glucose Once [[id number]]  (Abnormal) Collected:  07/13/20 1148    Specimen:  Blood Updated:  07/13/20 1203     Glucose 261 mg/dL      Comment: : [id number] Andrea Atomic ReachinaMeter ID: [id number]       POC Glucose Once [[id number]]  (Abnormal) Collected:  07/13/20 0740    Specimen:  Blood Updated:  07/13/20 0813     Glucose 243 mg/dL      Comment: : [id number] Andrea Atomic ReachinaMeter ID: [id number]       Blood Culture - Blood, Arm, Right [[id number]]  (Abnormal) Collected:  07/10/20 1744    Specimen:  Blood from Arm, Right Updated:  07/13/20 0536     Blood Culture Staphylococcus, coagulase negative     Comment: Probable contaminant requires clinical correlation, susceptibility not performed unless requested by physician.          Isolated from Aerobic Bottle     Gram Stain Aerobic Bottle Gram positive cocci in clusters    Basic Metabolic Panel [[id number]]  (Abnormal)  Collected:  07/13/20 0234    Specimen:  Blood Updated:  07/13/20 0418     Glucose 251 mg/dL      BUN 18 mg/dL      Creatinine 0.60 mg/dL      Sodium 139 mmol/L      Potassium 4.4 mmol/L      Chloride 98 mmol/L      CO2 30.0 mmol/L      Calcium 9.4 mg/dL      eGFR Non African Amer 98 mL/min/1.73      BUN/Creatinine Ratio 30.0     Anion Gap 11.0 mmol/L     CBC Auto Differential [[id number]]  (Abnormal) Collected:  07/13/20 0234    Specimen:  Blood Updated:  07/13/20 0404     WBC 14.29 10*3/mm3      RBC 3.51 10*6/mm3      Hemoglobin 9.5 g/dL      Hematocrit 30.6 %      MCV 87.2 fL      MCH 27.1 pg      MCHC 31.0 g/dL      RDW 15.0 %      RDW-SD 48.0 fl      MPV 10.5 fL      Platelets 282 10*3/mm3      Neutrophil % 82.9 %      Lymphocyte % 8.6 %      Monocyte % 7.1 %      Eosinophil % 0.3 %      Basophil % 0.4 %      Immature Grans % 0.7 %      Neutrophils, Absolute 11.85 10*3/mm3      Lymphocytes, Absolute 1.23 10*3/mm3      Monocytes, Absolute 1.01 10*3/mm3      Eosinophils, Absolute 0.04 10*3/mm3      Basophils, Absolute 0.06 10*3/mm3      Immature Grans, Absolute 0.10 10*3/mm3      nRBC 0.0 /100 WBC     POC Glucose Once [[id number]]  (Abnormal) Collected:  07/1945    Specimen:  Blood Updated:  07/12/20 1956     Glucose 354 mg/dL      Comment: : [id number] Kate LangraMeter ID: [id number]       POC Glucose Once [[id number]]  (Abnormal) Collected:  07/12/20 1639    Specimen:  Blood Updated:  07/12/20 1656     Glucose 339 mg/dL      Comment: : [id number] Rick EuraisaMeter ID: [id number]       POC Glucose Once [[id number]]  (Abnormal) Collected:  07/12/20 1102    Specimen:  Blood Updated:  07/12/20 1121     Glucose 356 mg/dL      Comment: : [id number] Rick EuraisaMeter ID: [id number]       POC Glucose Once [[id number]]  (Abnormal) Collected:  07/12/20 0748    Specimen:  Blood Updated:  07/12/20 0816     Glucose 259 mg/dL      Comment: : [id number] Rick EuraisaMeter ID: [id number]       Basic  Metabolic Panel [[id number]]  (Abnormal) Collected:  07/12/20 0209    Specimen:  Blood Updated:  07/12/20 0338     Glucose 314 mg/dL      BUN 17 mg/dL      Creatinine 0.50 mg/dL      Sodium 136 mmol/L      Potassium 4.6 mmol/L      Chloride 94 mmol/L      CO2 30.0 mmol/L      Calcium 10.0 mg/dL      eGFR Non African Amer 121 mL/min/1.73      BUN/Creatinine Ratio 34.0     Anion Gap 12.0 mmol/L     CBC Auto Differential [[id number]]  (Abnormal) Collected:  07/12/20 0209    Specimen:  Blood Updated:  07/12/20 0324     WBC 8.90 10*3/mm3      RBC 4.02 10*6/mm3      Hemoglobin 10.8 g/dL      Hematocrit 34.8 %      MCV 86.6 fL      MCH 26.9 pg      MCHC 31.0 g/dL      RDW 14.9 %      RDW-SD 47.5 fl      MPV 10.1 fL      Platelets 283 10*3/mm3      Neutrophil % 88.9 %      Lymphocyte % 6.6 %      Monocyte % 3.3 %      Eosinophil % 0.0 %      Basophil % 0.6 %      Immature Grans % 0.6 %      Neutrophils, Absolute 7.92 10*3/mm3      Lymphocytes, Absolute 0.59 10*3/mm3      Monocytes, Absolute 0.29 10*3/mm3      Eosinophils, Absolute 0.00 10*3/mm3      Basophils, Absolute 0.05 10*3/mm3      Immature Grans, Absolute 0.05 10*3/mm3      nRBC 0.0 /100 WBC     Blood Culture ID, PCR - Blood, Arm, Right [[id number]]  (Abnormal) Collected:  07/10/20 1744    Specimen:  Blood from Arm, Right Updated:  07/12/20 0324     BCID, PCR Staphylococcus spp, not aureus. Identification by BCID PCR.    POC Glucose Once [[id number]]  (Abnormal) Collected:  07/12/20 0023    Specimen:  Blood Updated:  07/12/20 0035     Glucose 325 mg/dL      Comment: : [id number] Kate Peoples ID: [id number]       POC Glucose Once [[id number]]  (Abnormal) Collected:  07/11/20 2018    Specimen:  Blood Updated:  07/11/20 2029     Glucose 198 mg/dL      Comment: : [id number] [place] Jessica SOTOeter ID: [id number]       POC Glucose Once [[id number]]  (Abnormal) Collected:  07/11/20 1644    Specimen:  Blood Updated:  07/11/20 1656     Glucose 342 mg/dL       Comment: : [id number] Rios AshleyMeter ID: [id number]       POC Glucose Once [[id number]]  (Normal) Collected:  07/11/20 1200    Specimen:  Blood Updated:  07/11/20 1211     Glucose 104 mg/dL      Comment: : [id number] Rios AshleyMeter ID: [id number]       MRSA Screen, PCR (Inpatient) - Swab, Nares [[id number]]  (Normal) Collected:  07/11/20 0858    Specimen:  Swab from Nares Updated:  07/11/20 1024     MRSA PCR No MRSA Detected    S. Pneumo Ag Urine or CSF - Urine, Urine, Clean Catch [[id number]]  (Normal) Collected:  07/11/20 0954    Specimen:  Urine, Clean Catch Updated:  07/11/20 1021     Strep Pneumo Ag Negative    Legionella Antigen, Urine - Urine, Urine, Clean Catch [[id number]]  (Normal) Collected:  07/11/20 0954    Specimen:  Urine, Clean Catch Updated:  07/11/20 1021     LEGIONELLA ANTIGEN, URINE Negative    POC Glucose Once [[id number]]  (Abnormal) Collected:  07/11/20 0751    Specimen:  Blood Updated:  07/11/20 0802     Glucose 202 mg/dL      Comment: : [id number] Rios AshleyMeter ID: [id number]       Basic Metabolic Panel [[id number]]  (Abnormal) Collected:  07/11/20 0526    Specimen:  Blood Updated:  07/11/20 0600     Glucose 238 mg/dL      BUN 13 mg/dL      Creatinine 0.68 mg/dL      Sodium 140 mmol/L      Potassium 4.0 mmol/L      Chloride 98 mmol/L      CO2 32.0 mmol/L      Calcium 9.2 mg/dL      eGFR Non African Amer 85 mL/min/1.73      BUN/Creatinine Ratio 19.1     Anion Gap 10.0 mmol/L     CBC Auto Differential [[id number]]  (Abnormal) Collected:  07/11/20 0526    Specimen:  Blood Updated:  07/11/20 0544     WBC 11.73 10*3/mm3      RBC 3.70 10*6/mm3      Hemoglobin 10.0 g/dL      Hematocrit 32.3 %      MCV 87.3 fL      MCH 27.0 pg      MCHC 31.0 g/dL      RDW 15.0 %      RDW-SD 48.1 fl      MPV 10.1 fL      Platelets 269 10*3/mm3      Neutrophil % 73.4 %      Lymphocyte % 10.9 %      Monocyte % 12.4 %      Eosinophil % 1.1 %      Basophil % 0.8 %      Immature Grans % 1.4 %      Neutrophils,  Absolute 8.60 10*3/mm3      Lymphocytes, Absolute 1.28 10*3/mm3      Monocytes, Absolute 1.46 10*3/mm3      Eosinophils, Absolute 0.13 10*3/mm3      Basophils, Absolute 0.09 10*3/mm3      Immature Grans, Absolute 0.17 10*3/mm3      nRBC 0.0 /100 WBC     Iron Profile [[id number]]  (Abnormal) Collected:  07/10/20 1744    Specimen:  Blood Updated:  07/11/20 0319     Iron 35 mcg/dL      Iron Saturation 12 %      Transferrin 190 mg/dL      TIBC 283 mcg/dL     Ferritin [[id number]]  (Abnormal) Collected:  07/10/20 1744    Specimen:  Blood Updated:  07/11/20 0319     Ferritin 187.40 ng/mL     Narrative:       Results may be falsely decreased if patient taking Biotin.      Reticulocytes [[id number]]  (Normal) Collected:  07/10/20 1744    Specimen:  Blood Updated:  07/11/20 0226     Reticulocyte % 1.46 %      Reticulocyte Absolute 0.0580 10*6/mm3     POC Occult Blood Stool [[id number]]  (Abnormal) Collected:  07/10/20 2011    Specimen:  Stool Updated:  07/10/20 2011     Fecal Occult Blood Positive     Lot Number \169\     Expiration Date \11/30/2020\     DEVELOPER LOT NUMBER \169\     DEVELOPER EXPIRATION DATE \11/30/2020\     Positive Control Positive     Negative Control Negative    D-dimer, Quantitative [[id number]]  (Abnormal) Collected:  07/10/20 1744    Specimen:  Blood Updated:  07/10/20 1834     D-Dimer, Quantitative 0.81 mg/L (FEU)     Narrative:       COVID-19, ABBOTT IN-HOUSE,NP Swab (NO TRANSPORT MEDIA) 2 HR TAT - Swab, Nasopharynx [[id number]]  (Normal) Collected:  07/10/20 1735    Specimen:  Swab from Nasopharynx Updated:  07/10/20 1821     COVID19 Not Detected    Narrative:       Comprehensive Metabolic Panel [[id number]]  (Abnormal) Collected:  07/10/20 1744    Specimen:  Blood Updated:  07/10/20 1813     Glucose 289 mg/dL      BUN 16 mg/dL      Creatinine 0.49 mg/dL      Sodium 137 mmol/L      Potassium 4.3 mmol/L      Chloride 96 mmol/L      CO2 28.0 mmol/L      Calcium 9.5 mg/dL      Total Protein 6.3 g/dL       Albumin 3.40 g/dL      ALT (SGPT) 9 U/L      AST (SGOT) 14 U/L      Alkaline Phosphatase 76 U/L      Total Bilirubin 0.2 mg/dL      eGFR Non African Amer 123 mL/min/1.73      Globulin 2.9 gm/dL      A/G Ratio 1.2 g/dL      BUN/Creatinine Ratio 32.7     Anion Gap 13.0 mmol/L     Troponin [[id number]]  (Normal) Collected:  07/10/20 1744    Specimen:  Blood Updated:  07/10/20 1811     Troponin T <0.010 ng/mL     BNP [[id number]]  (Normal) Collected:  07/10/20 1744    Specimen:  Blood Updated:  07/10/20 1810     proBNP 217.0 pg/mL     Lactic Acid, Plasma [[id number]]  (Normal) Collected:  07/10/20 1744    Specimen:  Blood Updated:  07/10/20 1809     Lactate 2.0 mmol/L     CBC Auto Differential [[id number]]  (Abnormal) Collected:  07/10/20 1744    Specimen:  Blood Updated:  07/10/20 1754     WBC 13.85 10*3/mm3      RBC 3.92 10*6/mm3      Hemoglobin 10.6 g/dL      Hematocrit 33.9 %      MCV 86.5 fL      MCH 27.0 pg      MCHC 31.3 g/dL      RDW 15.1 %      RDW-SD 47.8 fl      MPV 9.7 fL      Platelets 292 10*3/mm3      Neutrophil % 79.1 %      Lymphocyte % 8.7 %      Monocyte % 10.0 %      Eosinophil % 0.6 %      Basophil % 0.5 %      Immature Grans % 1.1 %      Neutrophils, Absolute 10.94 10*3/mm3      Lymphocytes, Absolute 1.21 10*3/mm3      Monocytes, Absolute 1.39 10*3/mm3      Eosinophils, Absolute 0.09 10*3/mm3      Basophils, Absolute 0.07 10*3/mm3      Immature Grans, Absolute 0.15 10*3/mm3      nRBC 0.0 /100 WBC     Blood Gas, Arterial [[id number]]  (Abnormal) Collected:  07/10/20 1740    Specimen:  Arterial Blood Updated:  07/10/20 1750     Site Right Brachial     Aneesh's Test N/A     pH, Arterial 7.432 pH units      pCO2, Arterial 48.3 mm Hg      Comment: 83 Value above reference range        pO2, Arterial 46.7 mm Hg      Comment: 85 Value below critical limit        HCO3, Arterial 32.2 mmol/L      Comment: 83 Value above reference range        Base Excess, Arterial 6.9 mmol/L      Comment: 83 Value above reference  range        O2 Saturation, Arterial 81.2 %      Comment: 84 Value below reference range        Temperature 37.0 C      Barometric Pressure for Blood Gas 749 mmHg      Modality Nasal Cannula     Flow Rate 4.0 lpm      Ventilator Mode NA     Notified Who DR PASCUAL     Notified By [id number]     Notified Time 07/10/2020 17:57     Collected by [id number]     Comment: Meter: [id number]     :  [id number]        pCO2, Temperature Corrected 48.3 mm Hg      pH, Temp Corrected 7.432 pH Units      pO2, Temperature Corrected 46.7 mm Hg         Imaging Results (All)     Procedure Component Value Units Date/Time    CT Angiogram Chest [[id number]] Collected:  07/10/20 2123     Updated:  07/10/20 2130    Narrative:       EXAMINATION:  CT ANGIOGRAM CHEST-  7/10/2020 8:57 PM CDT     HISTORY: Shortness of breath. Elevated d-dimer. Left lower lobe  infiltrate on x-ray.     COMPARISON : No comparison study.     DLP: 250 mGy-cm. Automated dosage control was utilized.     TECHNIQUE: CT angio was performed of the chest with contrast. Coronal,  sagittal and 3-D reconstruction were performed.     MEDIASTINUM, HEART AND VASCULAR STRUCTURES: There is atheromatous  disease of the thoracic aorta and coronary arteries. There is no CT  evidence of pulmonary embolus. There are multiple mediastinal lymph  nodes that are borderline in size. The largest has a short axis diameter  of 1.3 cm. The heart is normal in size.     LUNGS: The lungs are emphysematous. There is fairly dense airspace  consolidation in the left lower lobe. There is mild dependent infiltrate  in the right lower lobe likely due to atelectasis. There are small  peripheral infiltrates in the left upper lobe.     UPPER ABDOMEN: No acute findings.     BONES: There are degenerative changes of the spine. No acute bony  abnormality is seen.       Impression:       1. No CT evidence of pulmonary embolus.  2. CT confirms infiltrates in the left lung, most significant in the  left lower  lobe. This is likely due to pneumonia. There is mild  dependent atelectasis in the right lung base.  3. Centrilobular emphysema.  4. Mediastinal lymphadenopathy, likely reactive.     The full report of this exam was immediately signed and available to the  emergency room. The patient is currently in the emergency room.    This report was finalized on 07/10/2020 21:27 by Dr. Pete Jean-Baptiste MD.    XR Chest 1 View [[id number]] Collected:  07/10/20 1842     Updated:  07/10/20 1846    Narrative:       EXAMINATION:  XR CHEST 1 VW-  7/10/2020 6:36 PM CDT     HISTORY: CHF/COPD Protocol     COMPARISON: 05/31/2020.     FINDINGS:  There is patchy infiltrate in the left lung base. The right  lung is essentially clear. Emphysematous changes are noted. Bronchial  wall thickening appears stable. Heart size is normal. Thoracic aorta is  atheromatous. There is a subacute-chronic left humerus fracture.       Impression:       1. New infiltrate in the left lung base, worrisome for pneumonia.  2. Emphysema.        This report was finalized on 07/10/2020 18:42 by Dr. Pete Jean-Baptiste MD.        History of Present Illness on Day of Discharge: Patient states she had a coughing fit last night but is feeling better this morning.  She essentially feels she is at her respiratory baseline.  She is tolerating her home setting of oxygen.     Hospital Course:  Ms. Gregg is a 74-year-old  female who follows. Dr. Arsh Burns for primary care.  She has a medical history significant for chronic obstructive pulmonary disease, hypertension, chronic respiratory failure with hypoxia and hypercapnia requiring continuous oxygen, and diabetes mellitus type 2.  The patient presented to the [facility] emergency department on 7/10/2020 with complaints of shortness of breath.  Her oxygen level at home has been running lower as well.  In the emergency department, ABG showed pH of 7.43, PCO2 48, and PO2 of 46.  White blood cell count was  elevated at 13,000.  Laboratory work-up showed an elevated d-dimer, which prompted CT angiogram of the chest.  This showed infiltrate in the left lung, most significant in the left lower lobe.  Likely due to pneumonia.  There is also mediastinal lymphadenopathy, likely reactive.  The patient was admitted to the hospitalist service for further evaluation and management.    The patient was given Rocephin and azithromycin originally.  She had a recent hospitalization in May/Tonia, antibiotic therapy was changed to vancomycin and cefepime to cover healthcare associated pathogens.  Urinary antigens for strep and Legionella were negative.  MRSA nasal swab was negative.  Antibiotic therapy was de-escalated to cefepime monotherapy, and she has now been converted to oral Omnicef.  She will complete a total of 7 days of therapy.  She did have one blood culture positive for a coagulase-negative staph, which is felt to be a contaminant.  Respiratory culture shows growth of normal respiratory sander.  The patient's supplemental oxygen was increased on admission to high flow.  This has slowly been weaned, and she is tolerating her home setting of oxygen.  Patient has also been treated for acute exacerbation of chronic obstructive pulmonary disease.  She has been given DuoNeb breathing treatments, Mucinex, and pulmonary toileting with incentive spirometry.  She has been placed on a low-dose of prednisone, which will be tapered.    The patient's blood glucoses have been running in the 200-300 range.  Her hemoglobin A1c was checked, and is found to be 9.6%.  Approximately 1 year ago this was 7.1%.  We will increase her metformin to 1000 mg twice daily.  We have also placed her on low-dose Levemir nightly.  She has met with the diabetic educator, does feel comfortable with insulin administration.    Of note, the patient's anemia on admission had shown some worsening compared to her last admission.  Her hemoglobin was 12 on 6/4, 10.6 on  "admission, and 9.6 when last checked.  Her occult blood stool was found to be positive, but she was not felt to have an acute bleed.  She has never had a colonoscopy.  If felt necessary, this can be arranged by her primary care provider.    Overall, the patient is hemodynamically stable and appropriate for discharge home today.  We will order home health services for skilled nursing, and physical/occupational therapy.  She will follow-up with her primary care provider in 1 week.    Condition on Discharge:  Medically stable    Physical Exam on Discharge:  /64 (BP Location: Right arm, Patient Position: Lying)   Pulse 89   Temp 98.1 °F (36.7 °C) (Oral)   Resp 16   Ht 152.4 cm (60\")   Wt 55.9 kg (123 lb 3.2 oz)   SpO2 97%   BMI 24.06 kg/m²    Physical Exam  Constitutional: She is oriented to person, place, and time. She appears well-developed and well-nourished. No distress.   HENT:   Head: Normocephalic and atraumatic.   Neck: Normal range of motion. Neck supple. No JVD present. No tracheal deviation present.   Cardiovascular: Regular rhythm and intact distal pulses. Exam reveals no gallop and no friction rub.   Sinus-sinus tach   Pulmonary/Chest: She has no wheezes. She has no rales.   Grossly diminished throughout.   Abdominal: Soft. Bowel sounds are normal. She exhibits no distension. There is no tenderness. There is no guarding.   Musculoskeletal: Normal range of motion. She exhibits no edema or tenderness.   Neurological: She is alert and oriented to person, place, and time. No cranial nerve deficit.   Skin: Skin is warm and dry. No rash noted. No erythema.   Psychiatric: She has a normal mood and affect. Her behavior is normal. Judgment and thought content normal.   Vitals reviewed.    Discharge Disposition:  Home-Health Care [facility]    Discharge Medications:     Discharge Medications      New Medications      Instructions Start Date   cefdinir 300 MG capsule  Commonly known as:  OMNICEF   300 mg, " Oral, Every 12 Hours Scheduled      insulin detemir 100 UNIT/ML injection  Commonly known as:  LEVEMIR   10 Units, Subcutaneous, Nightly      Insulin Pen Needle 31G X 6 MM misc   1 each, Subcutaneous, Nightly      melatonin 5 MG tablet tablet   5 mg, Oral, Nightly PRN      predniSONE 10 MG tablet  Commonly known as:  DELTASONE   10 mg, Oral, Daily With Breakfast   Start Date:  July 16, 2020        Changes to Medications      Instructions Start Date   metFORMIN 1000 MG tablet  Commonly known as:  GLUCOPHAGE  What changed:    · medication strength  · how much to take   1,000 mg, Oral, 2 Times Daily With Meals         Continue These Medications      Instructions Start Date   arformoterol 15 MCG/2ML nebulizer solution  Commonly known as:  BROVANA   15 mcg, Nebulization, 2 Times Daily - RT      atorvastatin 80 MG tablet  Commonly known as:  LIPITOR   80 mg, Oral, Nightly      benzonatate 200 MG capsule  Commonly known as:  TESSALON   200 mg, Oral, 3 Times Daily PRN      budesonide 0.5 MG/2ML nebulizer solution  Commonly known as:  PULMICORT   0.5 mg, Nebulization, 2 Times Daily      clopidogrel 75 MG tablet  Commonly known as:  PLAVIX   75 mg, Oral, Daily      dilTIAZem  MG 24 hr capsule  Commonly known as:  CARDIZEM CD   120 mg, Oral, Daily      docusate sodium 100 MG capsule  Commonly known as:  COLACE   100 mg, Oral, 2 Times Daily PRN      escitalopram 10 MG tablet  Commonly known as:  LEXAPRO   10 mg, Oral, Daily      ferrous sulfate 325 (65 FE) MG tablet   325 mg, Oral, 2 Times Daily      fluticasone 50 MCG/ACT nasal spray  Commonly known as:  FLONASE   2 sprays, Nasal, Daily      guaiFENesin 600 MG 12 hr tablet  Commonly known as:  MUCINEX   1,200 mg, Oral, 2 Times Daily      hydrOXYzine 25 MG tablet  Commonly known as:  ATARAX   25 mg, Oral, Every 8 Hours PRN      ipratropium-albuterol 0.5-2.5 mg/3 ml nebulizer  Commonly known as:  DUO-NEB   3 mL, Nebulization, 4 Times Daily - RT      pantoprazole 40 MG EC  tablet  Commonly known as:  PROTONIX   40 mg, Oral, Daily      tiotropium 18 MCG per inhalation capsule  Commonly known as:  SPIRIVA   1 capsule, Inhalation, Daily - RT           Discharge Diet:   Diet Instructions     Diet: Cardiac, Consistent Carbohydrate; Thin      Discharge Diet:   Cardiac  Consistent Carbohydrate       Fluid Consistency:  Thin        Activity at Discharge:   Activity Instructions     Activity as Tolerated          Discharge Care Plan/Instructions:   1.  Return for any acute or worsening symptoms  2.  New medication Levemir.  Insulin pen needles prescribed.  Patient has met with diabetic educator.  3.  Complete course of prednisone and Omnicef  4.  Home health services for skilled nursing, physical/occupational therapy    Follow-up Appointments:   1.  Dr. Burns in 1 week  Future Appointments   Date Time Provider Department Center   9/23/2020  2:00 PM Arsh Burns, DO [facility] None     Test Results Pending at Discharge: We will follow blood cultures to completion.    TERRENCE Hansen  07/15/20  08:42    Time: 40 minutes          Electronically signed by Aubrey Parker MD at 07/15/20 4387

## 2020-07-16 NOTE — OUTREACH NOTE
Call Center TCM Note      Responses   [facility] patient discharged from?  [place]   COVID-19 Test Status  Negative   Does the patient have one of the following disease processes/diagnoses(primary or secondary)?  COPD/Pneumonia   Was the primary reason for admission:  COPD exacerbation   TCM attempt successful?  No   Unsuccessful attempts  Attempt 2          Helen Canchola RN    7/16/2020, 16:35

## 2020-07-16 NOTE — THERAPY DISCHARGE NOTE
Acute Care - Physical Therapy Discharge Summary  [facility]       Patient Name: Jessica Gregg  : 1945  MRN: [id number]    Today's Date: 2020  Onset of Illness/Injury or Date of Surgery: 07/10/20       Referring Physician: Dr. Parker      Admit Date: 7/10/2020      PT Recommendation and Plan    Visit Dx:    ICD-10-CM ICD-9-CM   1. Pneumonia due to infectious organism, unspecified laterality, unspecified part of lung J18.9 486   2. Gastrointestinal hemorrhage, unspecified gastrointestinal hemorrhage type K92.2 578.9   3. Decreased activities of daily living (ADL) Z78.9 V49.89   4. Impaired mobility Z74.09 799.89   5. Chronic obstructive pulmonary disease with acute exacerbation (CMS/HCC) J44.1 491.21       Outcome Measures     Row Name 07/15/20 0902 20 1402 20 1303       How much help from another is currently needed...    Putting on and taking off regular lower body clothing?  4  -CJ  4  -CJ  4  -CJ    Bathing (including washing, rinsing, and drying)  3  -CJ  3  -CJ  3  -CJ    Toileting (which includes using toilet bed pan or urinal)  3  -CJ  3  -CJ  3  -CJ    Putting on and taking off regular upper body clothing  4  -CJ  4  -CJ  4  -CJ    Taking care of personal grooming (such as brushing teeth)  4  -CJ  4  -CJ  4  -CJ    Eating meals  4  -CJ  4  -CJ  4  -CJ    AM-PAC 6 Clicks Score (OT)  22  -CJ  22  -CJ  22  -CJ       Functional Assessment    Outcome Measure Options  --  AM-PAC 6 Clicks Daily Activity (OT)  -CJ  AM-PAC 6 Clicks Daily Activity (OT)  -      User Key  (r) = Recorded By, (t) = Taken By, (c) = Cosigned By    Initials Name Provider Type    Gilberto Pereira COTA/L Occupational Therapy Assistant              Rehab Goal Summary     Row Name 20 1019 20 0835          Bed Mobility Goal 1 (PT)    Activity/Assistive Device (Bed Mobility Goal 1, PT)  supine to sit;sit to supine  -AB  --     [place] Level/Cues Needed (Bed Mobility Goal 1, PT)  independent  -AB  --      Time Frame (Bed Mobility Goal 1, PT)  long term goal (LTG)  -AB  --     Progress/Outcomes (Bed Mobility Goal 1, PT)  goal not met  -AB  --        Transfer Goal 1 (PT)    Activity/Assistive Device (Transfer Goal 1, PT)  sit-to-stand/stand-to-sit;bed-to-chair/chair-to-bed;walker, rolling  -AB  --     [place] Level/Cues Needed (Transfer Goal 1, PT)  conditional independence  -AB  --     Time Frame (Transfer Goal 1, PT)  long term goal (LTG)  -AB  --     Progress/Outcome (Transfer Goal 1, PT)  goal not met  -AB  --        Gait Training Goal 1 (PT)    Activity/Assistive Device (Gait Training Goal 1, PT)  gait (walking locomotion);improve balance and speed;increase endurance/gait distance;increase energy conservation;decrease fall risk;walker, rolling  -AB  --     [place] Level (Gait Training Goal 1, PT)  supervision required  -AB  --     Distance (Gait Goal 1, PT)  100  -AB  --     Time Frame (Gait Training Goal 1, PT)  long term goal (LTG)  -AB  --     Progress/Outcome (Gait Training Goal 1, PT)  goal met  -AB  --        Stairs Goal 1 (PT)    Activity/Assistive Device (Stairs Goal 1, PT)  stairs, all skills  -AB  --     [place] Level/Cues Needed (Stairs Goal 1, PT)  contact guard assist  -AB  --     Number of Stairs (Stairs Goal 1, PT)  1  -AB  --     Time Frame (Stairs Goal 1, PT)  long term goal (LTG)  -AB  --     Progress/Outcome (Stairs Goal 1, PT)  goal not met  -AB  --        Bathing Goal 1 (OT)    Activity/Assistive Device (Bathing Goal 1, OT)  --  bathing skills, all  -MT     [place] Level/Cues Needed (Bathing Goal 1, OT)  --  conditional independence  -MT     Time Frame (Bathing Goal 1, OT)  --  long term goal (LTG);by discharge  -MT     Progress/Outcomes (Bathing Goal 1, OT)  --  goal not met  -MT        Toileting Goal 1 (OT)    Activity/Device (Toileting Goal 1, OT)  --  toileting skills, all;commode  -MT     [place] Level/Cues Needed (Toileting Goal 1, OT)  --  independent  -MT      Time Frame (Toileting Goal 1, OT)  --  long term goal (LTG);by discharge  -MT     Progress/Outcome (Toileting Goal 1, OT)  --  goal not met  -MT         Activity Tolerance Goal 1 (OT)    Activity Tolerance Goal 1 (OT)  --  Pt will complete morning ADL routine with no recovery periods in standing to address decreased activity tolerance   -MT     Activity Level (Endurance Goal 1, OT)  --  10 min activity;O2 sat >/ equal to 88%  -MT     Time Frame (Activity Tolerance Goal 1, OT)  --  long term goal (LTG)  -MT     Progress/Outcome (Activity Tolerance Goal 1, OT)  --  goal not met  -MT       User Key  (r) = Recorded By, (t) = Taken By, (c) = Cosigned By    Initials Name Provider Type Discipline    AB Lyric Gregory, PREMA Physical Therapy Assistant PT    Cony Way COTA/L Occupational Therapy Assistant OT              PT Discharge Summary  Anticipated Discharge Disposition (PT): home with assist, home with home health  Reason for Discharge: Discharge from facility  Outcomes Achieved: Refer to plan of care for updates on goals achieved  Discharge Destination: Home with home health      Lyric Gregory PTA   7/16/2020

## 2020-07-17 ENCOUNTER — TRANSITIONAL CARE MANAGEMENT TELEPHONE ENCOUNTER (OUTPATIENT)
Dept: CALL CENTER | Facility: HOSPITAL | Age: 75
End: 2020-07-17

## 2020-07-17 DIAGNOSIS — J44.9 COPD, SEVERE (HCC): ICD-10-CM

## 2020-07-17 NOTE — OUTREACH NOTE
Call Center TCM Note      Responses   [facility] patient discharged from?  [place]   COVID-19 Test Status  Negative   Does the patient have one of the following disease processes/diagnoses(primary or secondary)?  COPD/Pneumonia   Was the primary reason for admission:  Pneumonia   TCM attempt successful?  Yes   Call start time  0957   Call end time  1000   Discharge diagnosis  PNA   Person spoke with today (if not patient) and relationship  RAYSA HUNTER -Russel updated phone number for patient   Meds reviewed with patient/caregiver?  Yes   Is the patient having any side effects they believe may be caused by any medication additions or changes?  No   Does the patient have all medications ordered at discharge?  Yes   Is the patient taking all medications as directed (includes completed medication regime)?  Yes   Does the patient have a primary care provider?   Yes   Does the patient have an appointment with their PCP or pulmonologist within 7 days of discharge?  Greater than 7 days   Comments regarding PCP  7/28/20    What is preventing the patient from scheduling follow up appointments within 7 days of discharge?  Earlier appointment not available   Nursing Interventions  Verified appointment date/time/provider   Has the patient kept scheduled appointments due by today?  N/A   What is the Home health agency?   [facility]   Has home health visited the patient within 72 hours of discharge?  Call prior to 72 hours   Pulse Ox monitoring  Intermittent   Pulse Ox device source  Patient   Psychosocial issues?  No   Comments  Pt wear 4 LPM of O2 continuous and CPAP at night   Did the patient receive a copy of their discharge instructions?  Yes   Nursing interventions  Reviewed instructions with patient   What is the patient's perception of their health status since discharge?  Improving   Nursing Interventions  Nurse provided patient education   Are the patient's immunizations up to date?   Yes   If the patient is a  current smoker, are they able to teach back resources for cessation?  -- [Nonsmoker]   Is the patient/caregiver able to teach back the hierarchy of who to call/visit for symptoms/problems? PCP, Specialist, Home health nurse, Urgent Care, ED, 911  Yes   Is the patient/caregiver able to teach back signs and symptoms of worsening condition:  Fever/chills, Shortness of breath, Chest pain   Is the patient/caregiver able to teach back importance of completing antibiotic course of treatment?  Yes   TCM call completed?  Yes          Laura Matias RN    7/17/2020, 10:02

## 2020-07-22 ENCOUNTER — READMISSION MANAGEMENT (OUTPATIENT)
Dept: CALL CENTER | Facility: HOSPITAL | Age: 75
End: 2020-07-22

## 2020-07-22 NOTE — OUTREACH NOTE
COPD/PN Week 2 Survey      Responses   [facility] patient discharged from?  [place]   COVID-19 Test Status  Negative   Does the patient have one of the following disease processes/diagnoses(primary or secondary)?  COPD/Pneumonia   Was the primary reason for admission:  Pneumonia   Week 2 attempt successful?  No   Unsuccessful attempts  Attempt 1          Peace Rodriguez RN

## 2020-07-23 ENCOUNTER — HOSPITAL ENCOUNTER (INPATIENT)
Facility: HOSPITAL | Age: 75
LOS: 5 days | Discharge: HOME HEALTH | End: 2020-07-28
Attending: EMERGENCY MEDICINE | Admitting: FAMILY MEDICINE

## 2020-07-23 ENCOUNTER — READMISSION MANAGEMENT (OUTPATIENT)
Dept: CALL CENTER | Facility: HOSPITAL | Age: 75
End: 2020-07-23

## 2020-07-23 ENCOUNTER — TELEPHONE (OUTPATIENT)
Dept: INTERNAL MEDICINE | Facility: CLINIC | Age: 75
End: 2020-07-23

## 2020-07-23 ENCOUNTER — APPOINTMENT (OUTPATIENT)
Dept: CT IMAGING | Facility: HOSPITAL | Age: 75
End: 2020-07-23

## 2020-07-23 ENCOUNTER — APPOINTMENT (OUTPATIENT)
Dept: GENERAL RADIOLOGY | Facility: HOSPITAL | Age: 75
End: 2020-07-23

## 2020-07-23 DIAGNOSIS — J44.1 COPD EXACERBATION (HCC): ICD-10-CM

## 2020-07-23 DIAGNOSIS — Z78.9 DECREASED ACTIVITIES OF DAILY LIVING (ADL): ICD-10-CM

## 2020-07-23 DIAGNOSIS — R09.02 HYPOXIC: Primary | ICD-10-CM

## 2020-07-23 LAB
A-A DO2: 57.8 MMHG
ALBUMIN SERPL-MCNC: 3.8 G/DL (ref 3.5–5.2)
ALBUMIN/GLOB SERPL: 1 G/DL
ALP SERPL-CCNC: 80 U/L (ref 39–117)
ALT SERPL W P-5'-P-CCNC: 8 U/L (ref 1–33)
ANION GAP SERPL CALCULATED.3IONS-SCNC: 14 MMOL/L (ref 5–15)
ARTERIAL PATENCY WRIST A: POSITIVE
AST SERPL-CCNC: 12 U/L (ref 1–32)
ATMOSPHERIC PRESS: 752 MMHG
BASE EXCESS BLDA CALC-SCNC: 3.5 MMOL/L (ref 0–2)
BASOPHILS # BLD AUTO: 0.06 10*3/MM3 (ref 0–0.2)
BASOPHILS NFR BLD AUTO: 0.5 % (ref 0–1.5)
BDY SITE: ABNORMAL
BILIRUB SERPL-MCNC: 0.4 MG/DL (ref 0–1.2)
BODY TEMPERATURE: 37 C
BUN SERPL-MCNC: 14 MG/DL (ref 8–23)
BUN/CREAT SERPL: 25 (ref 7–25)
CALCIUM SPEC-SCNC: 9.6 MG/DL (ref 8.6–10.5)
CHLORIDE SERPL-SCNC: 95 MMOL/L (ref 98–107)
CO2 SERPL-SCNC: 28 MMOL/L (ref 22–29)
COHGB MFR BLD: 1.8 % (ref 0–5)
CREAT SERPL-MCNC: 0.56 MG/DL (ref 0.57–1)
D DIMER PPP FEU-MCNC: 1.93 MG/L (FEU) (ref 0–0.5)
D-LACTATE SERPL-SCNC: 1.3 MMOL/L (ref 0.5–2)
D-LACTATE SERPL-SCNC: 2.4 MMOL/L (ref 0.5–2)
DEPRECATED RDW RBC AUTO: 50 FL (ref 37–54)
EOSINOPHIL # BLD AUTO: 0.67 10*3/MM3 (ref 0–0.4)
EOSINOPHIL NFR BLD AUTO: 5.5 % (ref 0.3–6.2)
ERYTHROCYTE [DISTWIDTH] IN BLOOD BY AUTOMATED COUNT: 15.8 % (ref 12.3–15.4)
GAS FLOW AIRWAY: 4 LPM
GFR SERPL CREATININE-BSD FRML MDRD: 106 ML/MIN/1.73
GLOBULIN UR ELPH-MCNC: 3.8 GM/DL
GLUCOSE SERPL-MCNC: 128 MG/DL (ref 65–99)
HCO3 BLDA-SCNC: 27.8 MMOL/L (ref 20–26)
HCT VFR BLD AUTO: 36.1 % (ref 34–46.6)
HCT VFR BLD CALC: 34.6 % (ref 38–51)
HGB BLD-MCNC: 11.1 G/DL (ref 12–15.9)
HGB BLDA-MCNC: 11.3 G/DL (ref 12–16)
HOLD SPECIMEN: NORMAL
IMM GRANULOCYTES # BLD AUTO: 0.07 10*3/MM3 (ref 0–0.05)
IMM GRANULOCYTES NFR BLD AUTO: 0.6 % (ref 0–0.5)
LACTATE HOLD SPECIMEN: NORMAL
LYMPHOCYTES # BLD AUTO: 1.44 10*3/MM3 (ref 0.7–3.1)
LYMPHOCYTES NFR BLD AUTO: 11.8 % (ref 19.6–45.3)
Lab: ABNORMAL
Lab: ABNORMAL
MCH RBC QN AUTO: 26.7 PG (ref 26.6–33)
MCHC RBC AUTO-ENTMCNC: 30.7 G/DL (ref 31.5–35.7)
MCV RBC AUTO: 86.8 FL (ref 79–97)
METHGB BLD QL: 0.3 % (ref 0–3)
MODALITY: ABNORMAL
MONOCYTES # BLD AUTO: 1.08 10*3/MM3 (ref 0.1–0.9)
MONOCYTES NFR BLD AUTO: 8.9 % (ref 5–12)
NEUTROPHILS NFR BLD AUTO: 72.7 % (ref 42.7–76)
NEUTROPHILS NFR BLD AUTO: 8.86 10*3/MM3 (ref 1.7–7)
NOTE: ABNORMAL
NOTIFIED BY: ABNORMAL
NOTIFIED WHO: ABNORMAL
NRBC BLD AUTO-RTO: 0 /100 WBC (ref 0–0.2)
NT-PROBNP SERPL-MCNC: 308.3 PG/ML (ref 0–900)
OXYHGB MFR BLDV: 78.3 % (ref 94–99)
PCO2 BLDA: 40.3 MM HG (ref 35–45)
PCO2 TEMP ADJ BLD: 40.3 MM HG (ref 35–45)
PH BLDA: 7.45 PH UNITS (ref 7.35–7.45)
PH, TEMP CORRECTED: 7.45 PH UNITS (ref 7.35–7.45)
PLATELET # BLD AUTO: 351 10*3/MM3 (ref 140–450)
PMV BLD AUTO: 9.3 FL (ref 6–12)
PO2 BLDA: 44.6 MM HG (ref 83–108)
PO2 TEMP ADJ BLD: 44.6 MM HG (ref 83–108)
POTASSIUM BLDA-SCNC: 4.4 MMOL/L (ref 3.5–5.2)
POTASSIUM SERPL-SCNC: 4.7 MMOL/L (ref 3.5–5.2)
PROT SERPL-MCNC: 7.6 G/DL (ref 6–8.5)
RBC # BLD AUTO: 4.16 10*6/MM3 (ref 3.77–5.28)
SAO2 % BLDCOA: 80 % (ref 94–99)
SARS-COV-2 RNA RESP QL NAA+PROBE: NOT DETECTED
SODIUM BLDA-SCNC: 137 MMOL/L (ref 136–145)
SODIUM SERPL-SCNC: 137 MMOL/L (ref 136–145)
TROPONIN T SERPL-MCNC: <0.01 NG/ML (ref 0–0.03)
VENTILATOR MODE: ABNORMAL
WBC # BLD AUTO: 12.18 10*3/MM3 (ref 3.4–10.8)
WHOLE BLOOD HOLD SPECIMEN: NORMAL
WHOLE BLOOD HOLD SPECIMEN: NORMAL

## 2020-07-23 PROCEDURE — 84484 ASSAY OF TROPONIN QUANT: CPT | Performed by: EMERGENCY MEDICINE

## 2020-07-23 PROCEDURE — 93010 ELECTROCARDIOGRAM REPORT: CPT | Performed by: INTERNAL MEDICINE

## 2020-07-23 PROCEDURE — 94799 UNLISTED PULMONARY SVC/PX: CPT

## 2020-07-23 PROCEDURE — 25010000002 PIPERACILLIN SOD-TAZOBACTAM PER 1 G: Performed by: INTERNAL MEDICINE

## 2020-07-23 PROCEDURE — 85025 COMPLETE CBC W/AUTO DIFF WBC: CPT | Performed by: EMERGENCY MEDICINE

## 2020-07-23 PROCEDURE — 82805 BLOOD GASES W/O2 SATURATION: CPT

## 2020-07-23 PROCEDURE — 87635 SARS-COV-2 COVID-19 AMP PRB: CPT | Performed by: EMERGENCY MEDICINE

## 2020-07-23 PROCEDURE — 99284 EMERGENCY DEPT VISIT MOD MDM: CPT

## 2020-07-23 PROCEDURE — 82375 ASSAY CARBOXYHB QUANT: CPT

## 2020-07-23 PROCEDURE — 0 IOPAMIDOL PER 1 ML: Performed by: FAMILY MEDICINE

## 2020-07-23 PROCEDURE — 93005 ELECTROCARDIOGRAM TRACING: CPT | Performed by: EMERGENCY MEDICINE

## 2020-07-23 PROCEDURE — 36600 WITHDRAWAL OF ARTERIAL BLOOD: CPT

## 2020-07-23 PROCEDURE — 36415 COLL VENOUS BLD VENIPUNCTURE: CPT | Performed by: EMERGENCY MEDICINE

## 2020-07-23 PROCEDURE — 80053 COMPREHEN METABOLIC PANEL: CPT | Performed by: EMERGENCY MEDICINE

## 2020-07-23 PROCEDURE — 71275 CT ANGIOGRAPHY CHEST: CPT

## 2020-07-23 PROCEDURE — 85379 FIBRIN DEGRADATION QUANT: CPT | Performed by: EMERGENCY MEDICINE

## 2020-07-23 PROCEDURE — 83880 ASSAY OF NATRIURETIC PEPTIDE: CPT | Performed by: EMERGENCY MEDICINE

## 2020-07-23 PROCEDURE — 83605 ASSAY OF LACTIC ACID: CPT | Performed by: EMERGENCY MEDICINE

## 2020-07-23 PROCEDURE — 63710000001 INSULIN DETEMIR PER 5 UNITS: Performed by: INTERNAL MEDICINE

## 2020-07-23 PROCEDURE — 71045 X-RAY EXAM CHEST 1 VIEW: CPT

## 2020-07-23 PROCEDURE — 25010000002 CEFTRIAXONE PER 250 MG: Performed by: EMERGENCY MEDICINE

## 2020-07-23 PROCEDURE — 25010000002 DEXAMETHASONE PER 1 MG: Performed by: EMERGENCY MEDICINE

## 2020-07-23 PROCEDURE — 94640 AIRWAY INHALATION TREATMENT: CPT

## 2020-07-23 PROCEDURE — 83050 HGB METHEMOGLOBIN QUAN: CPT

## 2020-07-23 RX ORDER — DEXAMETHASONE SODIUM PHOSPHATE 10 MG/ML
10 INJECTION INTRAMUSCULAR; INTRAVENOUS ONCE
Status: COMPLETED | OUTPATIENT
Start: 2020-07-23 | End: 2020-07-23

## 2020-07-23 RX ORDER — SODIUM CHLORIDE 0.9 % (FLUSH) 0.9 %
10 SYRINGE (ML) INJECTION AS NEEDED
Status: DISCONTINUED | OUTPATIENT
Start: 2020-07-23 | End: 2020-07-28 | Stop reason: HOSPADM

## 2020-07-23 RX ORDER — LANOLIN ALCOHOL/MO/W.PET/CERES
5 CREAM (GRAM) TOPICAL NIGHTLY PRN
Status: DISCONTINUED | OUTPATIENT
Start: 2020-07-23 | End: 2020-07-28 | Stop reason: HOSPADM

## 2020-07-23 RX ORDER — CLOPIDOGREL BISULFATE 75 MG/1
75 TABLET ORAL DAILY
Status: DISCONTINUED | OUTPATIENT
Start: 2020-07-24 | End: 2020-07-28 | Stop reason: HOSPADM

## 2020-07-23 RX ORDER — HYDROXYZINE HYDROCHLORIDE 25 MG/1
25 TABLET, FILM COATED ORAL EVERY 8 HOURS PRN
Status: DISCONTINUED | OUTPATIENT
Start: 2020-07-23 | End: 2020-07-28 | Stop reason: HOSPADM

## 2020-07-23 RX ORDER — BUDESONIDE 0.5 MG/2ML
0.5 INHALANT ORAL
Status: DISCONTINUED | OUTPATIENT
Start: 2020-07-23 | End: 2020-07-28 | Stop reason: HOSPADM

## 2020-07-23 RX ORDER — NICOTINE POLACRILEX 4 MG
15 LOZENGE BUCCAL
Status: DISCONTINUED | OUTPATIENT
Start: 2020-07-23 | End: 2020-07-26

## 2020-07-23 RX ORDER — ESCITALOPRAM OXALATE 10 MG/1
10 TABLET ORAL DAILY
Status: DISCONTINUED | OUTPATIENT
Start: 2020-07-24 | End: 2020-07-28 | Stop reason: HOSPADM

## 2020-07-23 RX ORDER — IPRATROPIUM BROMIDE AND ALBUTEROL SULFATE 2.5; .5 MG/3ML; MG/3ML
3 SOLUTION RESPIRATORY (INHALATION)
Status: DISCONTINUED | OUTPATIENT
Start: 2020-07-23 | End: 2020-07-28 | Stop reason: HOSPADM

## 2020-07-23 RX ORDER — ONDANSETRON 2 MG/ML
4 INJECTION INTRAMUSCULAR; INTRAVENOUS EVERY 6 HOURS PRN
Status: DISCONTINUED | OUTPATIENT
Start: 2020-07-23 | End: 2020-07-28 | Stop reason: HOSPADM

## 2020-07-23 RX ORDER — ALBUTEROL SULFATE 2.5 MG/3ML
2.5 SOLUTION RESPIRATORY (INHALATION)
Status: ACTIVE | OUTPATIENT
Start: 2020-07-23 | End: 2020-07-23

## 2020-07-23 RX ORDER — GUAIFENESIN 600 MG/1
1200 TABLET, EXTENDED RELEASE ORAL 2 TIMES DAILY
Status: DISCONTINUED | OUTPATIENT
Start: 2020-07-23 | End: 2020-07-28 | Stop reason: HOSPADM

## 2020-07-23 RX ORDER — SODIUM CHLORIDE 0.9 % (FLUSH) 0.9 %
10 SYRINGE (ML) INJECTION EVERY 12 HOURS SCHEDULED
Status: DISCONTINUED | OUTPATIENT
Start: 2020-07-23 | End: 2020-07-28 | Stop reason: HOSPADM

## 2020-07-23 RX ORDER — DOCUSATE SODIUM 100 MG/1
100 CAPSULE, LIQUID FILLED ORAL 2 TIMES DAILY PRN
Status: DISCONTINUED | OUTPATIENT
Start: 2020-07-23 | End: 2020-07-28 | Stop reason: HOSPADM

## 2020-07-23 RX ORDER — IPRATROPIUM BROMIDE AND ALBUTEROL SULFATE 2.5; .5 MG/3ML; MG/3ML
3 SOLUTION RESPIRATORY (INHALATION) ONCE
Status: COMPLETED | OUTPATIENT
Start: 2020-07-23 | End: 2020-07-23

## 2020-07-23 RX ORDER — FLUTICASONE PROPIONATE 50 MCG
2 SPRAY, SUSPENSION (ML) NASAL DAILY
Status: DISCONTINUED | OUTPATIENT
Start: 2020-07-24 | End: 2020-07-28 | Stop reason: HOSPADM

## 2020-07-23 RX ORDER — FERROUS SULFATE 325(65) MG
325 TABLET ORAL 2 TIMES DAILY WITH MEALS
Status: DISCONTINUED | OUTPATIENT
Start: 2020-07-23 | End: 2020-07-28 | Stop reason: HOSPADM

## 2020-07-23 RX ORDER — BENZONATATE 100 MG/1
200 CAPSULE ORAL 3 TIMES DAILY PRN
Status: DISCONTINUED | OUTPATIENT
Start: 2020-07-23 | End: 2020-07-28 | Stop reason: HOSPADM

## 2020-07-23 RX ORDER — ACETAMINOPHEN 325 MG/1
650 TABLET ORAL EVERY 4 HOURS PRN
Status: DISCONTINUED | OUTPATIENT
Start: 2020-07-23 | End: 2020-07-28 | Stop reason: HOSPADM

## 2020-07-23 RX ORDER — ATORVASTATIN CALCIUM 40 MG/1
80 TABLET, FILM COATED ORAL NIGHTLY
Status: DISCONTINUED | OUTPATIENT
Start: 2020-07-23 | End: 2020-07-28 | Stop reason: HOSPADM

## 2020-07-23 RX ORDER — DEXTROSE MONOHYDRATE 25 G/50ML
25 INJECTION, SOLUTION INTRAVENOUS
Status: DISCONTINUED | OUTPATIENT
Start: 2020-07-23 | End: 2020-07-26

## 2020-07-23 RX ORDER — DILTIAZEM HYDROCHLORIDE 120 MG/1
120 CAPSULE, COATED, EXTENDED RELEASE ORAL DAILY
Status: DISCONTINUED | OUTPATIENT
Start: 2020-07-24 | End: 2020-07-28 | Stop reason: HOSPADM

## 2020-07-23 RX ORDER — PANTOPRAZOLE SODIUM 40 MG/1
40 TABLET, DELAYED RELEASE ORAL DAILY
Status: DISCONTINUED | OUTPATIENT
Start: 2020-07-24 | End: 2020-07-28 | Stop reason: HOSPADM

## 2020-07-23 RX ADMIN — ATORVASTATIN CALCIUM 80 MG: 40 TABLET, FILM COATED ORAL at 22:35

## 2020-07-23 RX ADMIN — TAZOBACTAM SODIUM AND PIPERACILLIN SODIUM 3.38 G: 375; 3 INJECTION, SOLUTION INTRAVENOUS at 22:36

## 2020-07-23 RX ADMIN — IPRATROPIUM BROMIDE AND ALBUTEROL SULFATE 3 ML: 2.5; .5 SOLUTION RESPIRATORY (INHALATION) at 22:59

## 2020-07-23 RX ADMIN — INSULIN DETEMIR 10 UNITS: 100 INJECTION, SOLUTION SUBCUTANEOUS at 23:47

## 2020-07-23 RX ADMIN — IPRATROPIUM BROMIDE AND ALBUTEROL SULFATE 3 ML: 2.5; .5 SOLUTION RESPIRATORY (INHALATION) at 20:07

## 2020-07-23 RX ADMIN — SODIUM CHLORIDE, PRESERVATIVE FREE 10 ML: 5 INJECTION INTRAVENOUS at 22:36

## 2020-07-23 RX ADMIN — CEFTRIAXONE SODIUM 1 G: 1 INJECTION, POWDER, FOR SOLUTION INTRAMUSCULAR; INTRAVENOUS at 16:54

## 2020-07-23 RX ADMIN — GUAIFENESIN 1200 MG: 600 TABLET, EXTENDED RELEASE ORAL at 22:35

## 2020-07-23 RX ADMIN — BUDESONIDE 0.5 MG: 0.5 INHALANT RESPIRATORY (INHALATION) at 23:00

## 2020-07-23 RX ADMIN — IOPAMIDOL 100 ML: 755 INJECTION, SOLUTION INTRAVENOUS at 18:57

## 2020-07-23 RX ADMIN — DEXAMETHASONE SODIUM PHOSPHATE 10 MG: 10 INJECTION INTRAMUSCULAR; INTRAVENOUS at 16:53

## 2020-07-23 RX ADMIN — FERROUS SULFATE TAB 325 MG (65 MG ELEMENTAL FE) 325 MG: 325 (65 FE) TAB at 22:35

## 2020-07-23 NOTE — OUTREACH NOTE
COPD/PN Week 2 Survey      Responses   [facility] patient discharged from?  [place]   COVID-19 Test Status  Negative   Does the patient have one of the following disease processes/diagnoses(primary or secondary)?  COPD/Pneumonia   Was the primary reason for admission:  Pneumonia   Week 2 attempt successful?  No   Unsuccessful attempts  Attempt 2          Helen Canchola RN

## 2020-07-23 NOTE — TELEPHONE ENCOUNTER
Jillian with Home Health called stating patient is having a hard time breathing. She states that she is on 4.0 liters of O2 and her sats are in the 80's. Per Aletha patient needs to go to the ED to be seen. Home health nurse aware and voiced understanding.

## 2020-07-24 LAB
ACANTHOCYTES BLD QL SMEAR: ABNORMAL
ALBUMIN SERPL-MCNC: 3.5 G/DL (ref 3.5–5.2)
ALBUMIN/GLOB SERPL: 1 G/DL
ALP SERPL-CCNC: 76 U/L (ref 39–117)
ALT SERPL W P-5'-P-CCNC: 8 U/L (ref 1–33)
ANION GAP SERPL CALCULATED.3IONS-SCNC: 12 MMOL/L (ref 5–15)
AST SERPL-CCNC: 13 U/L (ref 1–32)
BASOPHILS # BLD MANUAL: 0.07 10*3/MM3 (ref 0–0.2)
BASOPHILS NFR BLD AUTO: 1 % (ref 0–1.5)
BILIRUB SERPL-MCNC: 0.3 MG/DL (ref 0–1.2)
BUN SERPL-MCNC: 21 MG/DL (ref 8–23)
BUN/CREAT SERPL: 35.6 (ref 7–25)
BURR CELLS BLD QL SMEAR: ABNORMAL
CALCIUM SPEC-SCNC: 9.3 MG/DL (ref 8.6–10.5)
CHLORIDE SERPL-SCNC: 100 MMOL/L (ref 98–107)
CO2 SERPL-SCNC: 28 MMOL/L (ref 22–29)
CREAT SERPL-MCNC: 0.59 MG/DL (ref 0.57–1)
DEPRECATED RDW RBC AUTO: 48.4 FL (ref 37–54)
ERYTHROCYTE [DISTWIDTH] IN BLOOD BY AUTOMATED COUNT: 15.4 % (ref 12.3–15.4)
GFR SERPL CREATININE-BSD FRML MDRD: 100 ML/MIN/1.73
GLOBULIN UR ELPH-MCNC: 3.4 GM/DL
GLUCOSE BLDC GLUCOMTR-MCNC: 249 MG/DL (ref 70–130)
GLUCOSE BLDC GLUCOMTR-MCNC: 283 MG/DL (ref 70–130)
GLUCOSE BLDC GLUCOMTR-MCNC: 375 MG/DL (ref 70–130)
GLUCOSE BLDC GLUCOMTR-MCNC: 379 MG/DL (ref 70–130)
GLUCOSE BLDC GLUCOMTR-MCNC: 381 MG/DL (ref 70–130)
GLUCOSE SERPL-MCNC: 279 MG/DL (ref 65–99)
HCT VFR BLD AUTO: 33.4 % (ref 34–46.6)
HGB BLD-MCNC: 10.5 G/DL (ref 12–15.9)
L PNEUMO1 AG UR QL IA: NEGATIVE
LYMPHOCYTES # BLD MANUAL: 0.26 10*3/MM3 (ref 0.7–3.1)
LYMPHOCYTES NFR BLD MANUAL: 4 % (ref 19.6–45.3)
MAGNESIUM SERPL-MCNC: 1.8 MG/DL (ref 1.6–2.4)
MCH RBC QN AUTO: 27.1 PG (ref 26.6–33)
MCHC RBC AUTO-ENTMCNC: 31.4 G/DL (ref 31.5–35.7)
MCV RBC AUTO: 86.3 FL (ref 79–97)
NEUTROPHILS # BLD AUTO: 6.21 10*3/MM3 (ref 1.7–7)
NEUTROPHILS NFR BLD MANUAL: 91 % (ref 42.7–76)
NEUTS BAND NFR BLD MANUAL: 3 % (ref 0–5)
PHOSPHATE SERPL-MCNC: 4.1 MG/DL (ref 2.5–4.5)
PLAT MORPH BLD: NORMAL
PLATELET # BLD AUTO: 319 10*3/MM3 (ref 140–450)
PMV BLD AUTO: 9.6 FL (ref 6–12)
POIKILOCYTOSIS BLD QL SMEAR: ABNORMAL
POLYCHROMASIA BLD QL SMEAR: ABNORMAL
POTASSIUM SERPL-SCNC: 5.1 MMOL/L (ref 3.5–5.2)
PROT SERPL-MCNC: 6.9 G/DL (ref 6–8.5)
RBC # BLD AUTO: 3.87 10*6/MM3 (ref 3.77–5.28)
S PNEUM AG SPEC QL LA: NEGATIVE
SODIUM SERPL-SCNC: 140 MMOL/L (ref 136–145)
TROPONIN T SERPL-MCNC: <0.01 NG/ML (ref 0–0.03)
VARIANT LYMPHS NFR BLD MANUAL: 1 % (ref 0–5)
WBC # BLD AUTO: 6.61 10*3/MM3 (ref 3.4–10.8)
WBC MORPH BLD: NORMAL

## 2020-07-24 PROCEDURE — 63710000001 PREDNISONE PER 1 MG: Performed by: NURSE PRACTITIONER

## 2020-07-24 PROCEDURE — 63710000001 INSULIN DETEMIR PER 5 UNITS: Performed by: NURSE PRACTITIONER

## 2020-07-24 PROCEDURE — 94799 UNLISTED PULMONARY SVC/PX: CPT

## 2020-07-24 PROCEDURE — 87899 AGENT NOS ASSAY W/OPTIC: CPT | Performed by: INTERNAL MEDICINE

## 2020-07-24 PROCEDURE — 82962 GLUCOSE BLOOD TEST: CPT

## 2020-07-24 PROCEDURE — 85025 COMPLETE CBC W/AUTO DIFF WBC: CPT | Performed by: INTERNAL MEDICINE

## 2020-07-24 PROCEDURE — 84484 ASSAY OF TROPONIN QUANT: CPT | Performed by: INTERNAL MEDICINE

## 2020-07-24 PROCEDURE — 25010000002 PIPERACILLIN SOD-TAZOBACTAM PER 1 G: Performed by: INTERNAL MEDICINE

## 2020-07-24 PROCEDURE — 83735 ASSAY OF MAGNESIUM: CPT | Performed by: INTERNAL MEDICINE

## 2020-07-24 PROCEDURE — 85007 BL SMEAR W/DIFF WBC COUNT: CPT | Performed by: INTERNAL MEDICINE

## 2020-07-24 PROCEDURE — 84100 ASSAY OF PHOSPHORUS: CPT | Performed by: INTERNAL MEDICINE

## 2020-07-24 PROCEDURE — 80053 COMPREHEN METABOLIC PANEL: CPT | Performed by: INTERNAL MEDICINE

## 2020-07-24 PROCEDURE — 63710000001 INSULIN LISPRO (HUMAN) PER 5 UNITS: Performed by: INTERNAL MEDICINE

## 2020-07-24 RX ORDER — PREDNISONE 20 MG/1
20 TABLET ORAL 2 TIMES DAILY WITH MEALS
Status: DISCONTINUED | OUTPATIENT
Start: 2020-07-24 | End: 2020-07-26

## 2020-07-24 RX ADMIN — ATORVASTATIN CALCIUM 80 MG: 40 TABLET, FILM COATED ORAL at 20:34

## 2020-07-24 RX ADMIN — SODIUM CHLORIDE, PRESERVATIVE FREE 10 ML: 5 INJECTION INTRAVENOUS at 08:18

## 2020-07-24 RX ADMIN — CLOPIDOGREL 75 MG: 75 TABLET, FILM COATED ORAL at 08:18

## 2020-07-24 RX ADMIN — IPRATROPIUM BROMIDE AND ALBUTEROL SULFATE 3 ML: 2.5; .5 SOLUTION RESPIRATORY (INHALATION) at 15:11

## 2020-07-24 RX ADMIN — BUDESONIDE 0.5 MG: 0.5 INHALANT RESPIRATORY (INHALATION) at 07:37

## 2020-07-24 RX ADMIN — GUAIFENESIN 1200 MG: 600 TABLET, EXTENDED RELEASE ORAL at 20:34

## 2020-07-24 RX ADMIN — INSULIN DETEMIR 15 UNITS: 100 INJECTION, SOLUTION SUBCUTANEOUS at 20:32

## 2020-07-24 RX ADMIN — BUDESONIDE 0.5 MG: 0.5 INHALANT RESPIRATORY (INHALATION) at 19:53

## 2020-07-24 RX ADMIN — IPRATROPIUM BROMIDE AND ALBUTEROL SULFATE 3 ML: 2.5; .5 SOLUTION RESPIRATORY (INHALATION) at 11:34

## 2020-07-24 RX ADMIN — INSULIN LISPRO 3 UNITS: 100 INJECTION, SOLUTION INTRAVENOUS; SUBCUTANEOUS at 08:17

## 2020-07-24 RX ADMIN — FERROUS SULFATE TAB 325 MG (65 MG ELEMENTAL FE) 325 MG: 325 (65 FE) TAB at 08:18

## 2020-07-24 RX ADMIN — DILTIAZEM HYDROCHLORIDE 120 MG: 120 CAPSULE, COATED, EXTENDED RELEASE ORAL at 08:18

## 2020-07-24 RX ADMIN — IPRATROPIUM BROMIDE AND ALBUTEROL SULFATE 3 ML: 2.5; .5 SOLUTION RESPIRATORY (INHALATION) at 19:54

## 2020-07-24 RX ADMIN — PREDNISONE 20 MG: 20 TABLET ORAL at 17:26

## 2020-07-24 RX ADMIN — INSULIN LISPRO 6 UNITS: 100 INJECTION, SOLUTION INTRAVENOUS; SUBCUTANEOUS at 17:26

## 2020-07-24 RX ADMIN — SODIUM CHLORIDE, PRESERVATIVE FREE 10 ML: 5 INJECTION INTRAVENOUS at 20:34

## 2020-07-24 RX ADMIN — ESCITALOPRAM 10 MG: 10 TABLET, FILM COATED ORAL at 08:18

## 2020-07-24 RX ADMIN — IPRATROPIUM BROMIDE AND ALBUTEROL SULFATE 3 ML: 2.5; .5 SOLUTION RESPIRATORY (INHALATION) at 07:31

## 2020-07-24 RX ADMIN — INSULIN LISPRO 4 UNITS: 100 INJECTION, SOLUTION INTRAVENOUS; SUBCUTANEOUS at 12:08

## 2020-07-24 RX ADMIN — GUAIFENESIN 1200 MG: 600 TABLET, EXTENDED RELEASE ORAL at 08:18

## 2020-07-24 RX ADMIN — PANTOPRAZOLE SODIUM 40 MG: 40 TABLET, DELAYED RELEASE ORAL at 08:18

## 2020-07-24 RX ADMIN — TAZOBACTAM SODIUM AND PIPERACILLIN SODIUM 3.38 G: 375; 3 INJECTION, SOLUTION INTRAVENOUS at 03:44

## 2020-07-24 RX ADMIN — FERROUS SULFATE TAB 325 MG (65 MG ELEMENTAL FE) 325 MG: 325 (65 FE) TAB at 17:26

## 2020-07-24 NOTE — OUTREACH NOTE
COPD/PN Week 3 Survey      Responses   [facility] patient discharged from?  [place]   COVID-19 Test Status  Negative   Does the patient have one of the following disease processes/diagnoses(primary or secondary)?  COPD/Pneumonia   Was the primary reason for admission:  Pneumonia   Week 3 attempt successful?  No   Revoke  Readmitted          Monalisa García RN

## 2020-07-25 LAB
ANION GAP SERPL CALCULATED.3IONS-SCNC: 15 MMOL/L (ref 5–15)
BUN SERPL-MCNC: 19 MG/DL (ref 8–23)
BUN/CREAT SERPL: 46.3 (ref 7–25)
CALCIUM SPEC-SCNC: 9.4 MG/DL (ref 8.6–10.5)
CHLORIDE SERPL-SCNC: 100 MMOL/L (ref 98–107)
CO2 SERPL-SCNC: 24 MMOL/L (ref 22–29)
CREAT SERPL-MCNC: 0.41 MG/DL (ref 0.57–1)
DEPRECATED RDW RBC AUTO: 47.4 FL (ref 37–54)
ERYTHROCYTE [DISTWIDTH] IN BLOOD BY AUTOMATED COUNT: 15.4 % (ref 12.3–15.4)
GFR SERPL CREATININE-BSD FRML MDRD: >150 ML/MIN/1.73
GLUCOSE BLDC GLUCOMTR-MCNC: 261 MG/DL (ref 70–130)
GLUCOSE BLDC GLUCOMTR-MCNC: 305 MG/DL (ref 70–130)
GLUCOSE BLDC GLUCOMTR-MCNC: 379 MG/DL (ref 70–130)
GLUCOSE BLDC GLUCOMTR-MCNC: 388 MG/DL (ref 70–130)
GLUCOSE SERPL-MCNC: 293 MG/DL (ref 65–99)
HCT VFR BLD AUTO: 30.1 % (ref 34–46.6)
HGB BLD-MCNC: 9.8 G/DL (ref 12–15.9)
MCH RBC QN AUTO: 27.7 PG (ref 26.6–33)
MCHC RBC AUTO-ENTMCNC: 32.6 G/DL (ref 31.5–35.7)
MCV RBC AUTO: 85 FL (ref 79–97)
PLATELET # BLD AUTO: 256 10*3/MM3 (ref 140–450)
PMV BLD AUTO: 9.7 FL (ref 6–12)
POTASSIUM SERPL-SCNC: 5 MMOL/L (ref 3.5–5.2)
RBC # BLD AUTO: 3.54 10*6/MM3 (ref 3.77–5.28)
SODIUM SERPL-SCNC: 139 MMOL/L (ref 136–145)
WBC # BLD AUTO: 15.38 10*3/MM3 (ref 3.4–10.8)

## 2020-07-25 PROCEDURE — 85027 COMPLETE CBC AUTOMATED: CPT | Performed by: NURSE PRACTITIONER

## 2020-07-25 PROCEDURE — 63710000001 PREDNISONE PER 1 MG: Performed by: NURSE PRACTITIONER

## 2020-07-25 PROCEDURE — 63710000001 INSULIN LISPRO (HUMAN) PER 5 UNITS: Performed by: INTERNAL MEDICINE

## 2020-07-25 PROCEDURE — 63710000001 INSULIN DETEMIR PER 5 UNITS: Performed by: FAMILY MEDICINE

## 2020-07-25 PROCEDURE — 94799 UNLISTED PULMONARY SVC/PX: CPT

## 2020-07-25 PROCEDURE — 82962 GLUCOSE BLOOD TEST: CPT

## 2020-07-25 PROCEDURE — 80048 BASIC METABOLIC PNL TOTAL CA: CPT | Performed by: NURSE PRACTITIONER

## 2020-07-25 RX ADMIN — IPRATROPIUM BROMIDE AND ALBUTEROL SULFATE 3 ML: 2.5; .5 SOLUTION RESPIRATORY (INHALATION) at 11:16

## 2020-07-25 RX ADMIN — PANTOPRAZOLE SODIUM 40 MG: 40 TABLET, DELAYED RELEASE ORAL at 08:00

## 2020-07-25 RX ADMIN — ATORVASTATIN CALCIUM 80 MG: 40 TABLET, FILM COATED ORAL at 22:15

## 2020-07-25 RX ADMIN — FERROUS SULFATE TAB 325 MG (65 MG ELEMENTAL FE) 325 MG: 325 (65 FE) TAB at 17:19

## 2020-07-25 RX ADMIN — IPRATROPIUM BROMIDE AND ALBUTEROL SULFATE 3 ML: 2.5; .5 SOLUTION RESPIRATORY (INHALATION) at 15:23

## 2020-07-25 RX ADMIN — ESCITALOPRAM 10 MG: 10 TABLET, FILM COATED ORAL at 08:00

## 2020-07-25 RX ADMIN — INSULIN LISPRO 6 UNITS: 100 INJECTION, SOLUTION INTRAVENOUS; SUBCUTANEOUS at 17:19

## 2020-07-25 RX ADMIN — FERROUS SULFATE TAB 325 MG (65 MG ELEMENTAL FE) 325 MG: 325 (65 FE) TAB at 08:00

## 2020-07-25 RX ADMIN — IPRATROPIUM BROMIDE AND ALBUTEROL SULFATE 3 ML: 2.5; .5 SOLUTION RESPIRATORY (INHALATION) at 19:50

## 2020-07-25 RX ADMIN — INSULIN DETEMIR 10 UNITS: 100 INJECTION, SOLUTION SUBCUTANEOUS at 22:16

## 2020-07-25 RX ADMIN — SODIUM CHLORIDE, PRESERVATIVE FREE 10 ML: 5 INJECTION INTRAVENOUS at 08:00

## 2020-07-25 RX ADMIN — CLOPIDOGREL 75 MG: 75 TABLET, FILM COATED ORAL at 08:00

## 2020-07-25 RX ADMIN — FLUTICASONE PROPIONATE 2 SPRAY: 50 SPRAY, METERED NASAL at 08:00

## 2020-07-25 RX ADMIN — GUAIFENESIN 1200 MG: 600 TABLET, EXTENDED RELEASE ORAL at 08:00

## 2020-07-25 RX ADMIN — DILTIAZEM HYDROCHLORIDE 120 MG: 120 CAPSULE, COATED, EXTENDED RELEASE ORAL at 08:00

## 2020-07-25 RX ADMIN — PREDNISONE 20 MG: 20 TABLET ORAL at 07:55

## 2020-07-25 RX ADMIN — IPRATROPIUM BROMIDE AND ALBUTEROL SULFATE 3 ML: 2.5; .5 SOLUTION RESPIRATORY (INHALATION) at 07:00

## 2020-07-25 RX ADMIN — PREDNISONE 20 MG: 20 TABLET ORAL at 17:19

## 2020-07-25 RX ADMIN — GUAIFENESIN 1200 MG: 600 TABLET, EXTENDED RELEASE ORAL at 22:15

## 2020-07-25 RX ADMIN — LINAGLIPTIN 5 MG: 5 TABLET, FILM COATED ORAL at 17:19

## 2020-07-25 RX ADMIN — INSULIN LISPRO 4 UNITS: 100 INJECTION, SOLUTION INTRAVENOUS; SUBCUTANEOUS at 08:00

## 2020-07-25 RX ADMIN — BUDESONIDE 0.5 MG: 0.5 INHALANT RESPIRATORY (INHALATION) at 07:07

## 2020-07-25 RX ADMIN — BUDESONIDE 0.5 MG: 0.5 INHALANT RESPIRATORY (INHALATION) at 19:51

## 2020-07-25 RX ADMIN — INSULIN LISPRO 5 UNITS: 100 INJECTION, SOLUTION INTRAVENOUS; SUBCUTANEOUS at 12:16

## 2020-07-26 LAB
ALBUMIN SERPL-MCNC: 3.7 G/DL (ref 3.5–5.2)
ALBUMIN/GLOB SERPL: 1.1 G/DL
ALP SERPL-CCNC: 68 U/L (ref 39–117)
ALT SERPL W P-5'-P-CCNC: 8 U/L (ref 1–33)
ANION GAP SERPL CALCULATED.3IONS-SCNC: 14 MMOL/L (ref 5–15)
AST SERPL-CCNC: 11 U/L (ref 1–32)
BASOPHILS # BLD AUTO: 0.02 10*3/MM3 (ref 0–0.2)
BASOPHILS NFR BLD AUTO: 0.1 % (ref 0–1.5)
BILIRUB SERPL-MCNC: 0.2 MG/DL (ref 0–1.2)
BUN SERPL-MCNC: 22 MG/DL (ref 8–23)
BUN/CREAT SERPL: 40 (ref 7–25)
CALCIUM SPEC-SCNC: 9.6 MG/DL (ref 8.6–10.5)
CHLORIDE SERPL-SCNC: 98 MMOL/L (ref 98–107)
CHOLEST SERPL-MCNC: 180 MG/DL (ref 0–200)
CO2 SERPL-SCNC: 25 MMOL/L (ref 22–29)
CREAT SERPL-MCNC: 0.55 MG/DL (ref 0.57–1)
DEPRECATED RDW RBC AUTO: 48.5 FL (ref 37–54)
EOSINOPHIL # BLD AUTO: 0 10*3/MM3 (ref 0–0.4)
EOSINOPHIL NFR BLD AUTO: 0 % (ref 0.3–6.2)
ERYTHROCYTE [DISTWIDTH] IN BLOOD BY AUTOMATED COUNT: 15.5 % (ref 12.3–15.4)
GFR SERPL CREATININE-BSD FRML MDRD: 108 ML/MIN/1.73
GLOBULIN UR ELPH-MCNC: 3.3 GM/DL
GLUCOSE BLDC GLUCOMTR-MCNC: 259 MG/DL (ref 70–130)
GLUCOSE BLDC GLUCOMTR-MCNC: 349 MG/DL (ref 70–130)
GLUCOSE BLDC GLUCOMTR-MCNC: 403 MG/DL (ref 70–130)
GLUCOSE BLDC GLUCOMTR-MCNC: 433 MG/DL (ref 70–130)
GLUCOSE BLDC GLUCOMTR-MCNC: 459 MG/DL (ref 70–130)
GLUCOSE BLDC GLUCOMTR-MCNC: 460 MG/DL (ref 70–130)
GLUCOSE BLDC GLUCOMTR-MCNC: 472 MG/DL (ref 70–130)
GLUCOSE BLDC GLUCOMTR-MCNC: 492 MG/DL (ref 70–130)
GLUCOSE SERPL-MCNC: 403 MG/DL (ref 65–99)
HBA1C MFR BLD: 9.5 % (ref 4.8–5.6)
HCT VFR BLD AUTO: 31.4 % (ref 34–46.6)
HDLC SERPL-MCNC: 60 MG/DL (ref 40–60)
HGB BLD-MCNC: 9.9 G/DL (ref 12–15.9)
IMM GRANULOCYTES # BLD AUTO: 0.06 10*3/MM3 (ref 0–0.05)
IMM GRANULOCYTES NFR BLD AUTO: 0.4 % (ref 0–0.5)
LDLC SERPL CALC-MCNC: 100 MG/DL (ref 0–100)
LDLC/HDLC SERPL: 1.67 {RATIO}
LYMPHOCYTES # BLD AUTO: 0.5 10*3/MM3 (ref 0.7–3.1)
LYMPHOCYTES NFR BLD AUTO: 3.4 % (ref 19.6–45.3)
MCH RBC QN AUTO: 27.2 PG (ref 26.6–33)
MCHC RBC AUTO-ENTMCNC: 31.5 G/DL (ref 31.5–35.7)
MCV RBC AUTO: 86.3 FL (ref 79–97)
MONOCYTES # BLD AUTO: 0.42 10*3/MM3 (ref 0.1–0.9)
MONOCYTES NFR BLD AUTO: 2.8 % (ref 5–12)
NEUTROPHILS NFR BLD AUTO: 13.83 10*3/MM3 (ref 1.7–7)
NEUTROPHILS NFR BLD AUTO: 93.3 % (ref 42.7–76)
NRBC BLD AUTO-RTO: 0 /100 WBC (ref 0–0.2)
PLATELET # BLD AUTO: 380 10*3/MM3 (ref 140–450)
PMV BLD AUTO: 10 FL (ref 6–12)
POTASSIUM SERPL-SCNC: 4.9 MMOL/L (ref 3.5–5.2)
PROT SERPL-MCNC: 7 G/DL (ref 6–8.5)
RBC # BLD AUTO: 3.64 10*6/MM3 (ref 3.77–5.28)
SODIUM SERPL-SCNC: 137 MMOL/L (ref 136–145)
TRIGL SERPL-MCNC: 98 MG/DL (ref 0–150)
TSH SERPL DL<=0.05 MIU/L-ACNC: 0.52 UIU/ML (ref 0.27–4.2)
VLDLC SERPL-MCNC: 19.6 MG/DL
WBC # BLD AUTO: 14.83 10*3/MM3 (ref 3.4–10.8)

## 2020-07-26 PROCEDURE — 94799 UNLISTED PULMONARY SVC/PX: CPT

## 2020-07-26 PROCEDURE — 83036 HEMOGLOBIN GLYCOSYLATED A1C: CPT | Performed by: FAMILY MEDICINE

## 2020-07-26 PROCEDURE — 97166 OT EVAL MOD COMPLEX 45 MIN: CPT

## 2020-07-26 PROCEDURE — 80053 COMPREHEN METABOLIC PANEL: CPT | Performed by: FAMILY MEDICINE

## 2020-07-26 PROCEDURE — 80061 LIPID PANEL: CPT | Performed by: FAMILY MEDICINE

## 2020-07-26 PROCEDURE — 82962 GLUCOSE BLOOD TEST: CPT

## 2020-07-26 PROCEDURE — 85025 COMPLETE CBC W/AUTO DIFF WBC: CPT | Performed by: FAMILY MEDICINE

## 2020-07-26 PROCEDURE — 63710000001 PREDNISONE PER 1 MG: Performed by: NURSE PRACTITIONER

## 2020-07-26 PROCEDURE — 63710000001 PREDNISONE PER 1 MG: Performed by: FAMILY MEDICINE

## 2020-07-26 PROCEDURE — 63710000001 INSULIN LISPRO (HUMAN) PER 5 UNITS: Performed by: INTERNAL MEDICINE

## 2020-07-26 PROCEDURE — 63710000001 INSULIN DETEMIR PER 5 UNITS: Performed by: FAMILY MEDICINE

## 2020-07-26 PROCEDURE — 84443 ASSAY THYROID STIM HORMONE: CPT | Performed by: FAMILY MEDICINE

## 2020-07-26 PROCEDURE — 63710000001 INSULIN REGULAR HUMAN PER 5 UNITS: Performed by: FAMILY MEDICINE

## 2020-07-26 RX ORDER — PREDNISONE 10 MG/1
10 TABLET ORAL 2 TIMES DAILY WITH MEALS
Status: DISCONTINUED | OUTPATIENT
Start: 2020-07-26 | End: 2020-07-28 | Stop reason: HOSPADM

## 2020-07-26 RX ORDER — NICOTINE POLACRILEX 4 MG
15 LOZENGE BUCCAL
Status: DISCONTINUED | OUTPATIENT
Start: 2020-07-26 | End: 2020-07-28 | Stop reason: HOSPADM

## 2020-07-26 RX ORDER — DEXTROSE MONOHYDRATE 25 G/50ML
25 INJECTION, SOLUTION INTRAVENOUS
Status: DISCONTINUED | OUTPATIENT
Start: 2020-07-26 | End: 2020-07-28 | Stop reason: HOSPADM

## 2020-07-26 RX ADMIN — IPRATROPIUM BROMIDE AND ALBUTEROL SULFATE 3 ML: 2.5; .5 SOLUTION RESPIRATORY (INHALATION) at 15:14

## 2020-07-26 RX ADMIN — INSULIN DETEMIR 10 UNITS: 100 INJECTION, SOLUTION SUBCUTANEOUS at 08:00

## 2020-07-26 RX ADMIN — HYDROXYZINE HYDROCHLORIDE 25 MG: 25 TABLET ORAL at 02:29

## 2020-07-26 RX ADMIN — INSULIN LISPRO 3 UNITS: 100 INJECTION, SOLUTION INTRAVENOUS; SUBCUTANEOUS at 08:00

## 2020-07-26 RX ADMIN — IPRATROPIUM BROMIDE AND ALBUTEROL SULFATE 3 ML: 2.5; .5 SOLUTION RESPIRATORY (INHALATION) at 10:30

## 2020-07-26 RX ADMIN — INSULIN DETEMIR 15 UNITS: 100 INJECTION, SOLUTION SUBCUTANEOUS at 20:37

## 2020-07-26 RX ADMIN — FERROUS SULFATE TAB 325 MG (65 MG ELEMENTAL FE) 325 MG: 325 (65 FE) TAB at 17:02

## 2020-07-26 RX ADMIN — ATORVASTATIN CALCIUM 80 MG: 40 TABLET, FILM COATED ORAL at 20:22

## 2020-07-26 RX ADMIN — SODIUM CHLORIDE, PRESERVATIVE FREE 10 ML: 5 INJECTION INTRAVENOUS at 08:00

## 2020-07-26 RX ADMIN — ESCITALOPRAM 10 MG: 10 TABLET, FILM COATED ORAL at 07:59

## 2020-07-26 RX ADMIN — BUDESONIDE 0.5 MG: 0.5 INHALANT RESPIRATORY (INHALATION) at 06:44

## 2020-07-26 RX ADMIN — IPRATROPIUM BROMIDE AND ALBUTEROL SULFATE 3 ML: 2.5; .5 SOLUTION RESPIRATORY (INHALATION) at 19:52

## 2020-07-26 RX ADMIN — PREDNISONE 10 MG: 20 TABLET ORAL at 17:02

## 2020-07-26 RX ADMIN — GUAIFENESIN 1200 MG: 600 TABLET, EXTENDED RELEASE ORAL at 20:22

## 2020-07-26 RX ADMIN — PREDNISONE 20 MG: 20 TABLET ORAL at 07:58

## 2020-07-26 RX ADMIN — FERROUS SULFATE TAB 325 MG (65 MG ELEMENTAL FE) 325 MG: 325 (65 FE) TAB at 07:58

## 2020-07-26 RX ADMIN — LINAGLIPTIN 5 MG: 5 TABLET, FILM COATED ORAL at 07:58

## 2020-07-26 RX ADMIN — INSULIN LISPRO 7 UNITS: 100 INJECTION, SOLUTION INTRAVENOUS; SUBCUTANEOUS at 04:36

## 2020-07-26 RX ADMIN — CLOPIDOGREL 75 MG: 75 TABLET, FILM COATED ORAL at 07:58

## 2020-07-26 RX ADMIN — IPRATROPIUM BROMIDE AND ALBUTEROL SULFATE 3 ML: 2.5; .5 SOLUTION RESPIRATORY (INHALATION) at 06:38

## 2020-07-26 RX ADMIN — PANTOPRAZOLE SODIUM 40 MG: 40 TABLET, DELAYED RELEASE ORAL at 07:59

## 2020-07-26 RX ADMIN — INSULIN LISPRO 5 UNITS: 100 INJECTION, SOLUTION INTRAVENOUS; SUBCUTANEOUS at 11:40

## 2020-07-26 RX ADMIN — GUAIFENESIN 1200 MG: 600 TABLET, EXTENDED RELEASE ORAL at 07:59

## 2020-07-26 RX ADMIN — SODIUM CHLORIDE, PRESERVATIVE FREE 10 ML: 5 INJECTION INTRAVENOUS at 20:23

## 2020-07-26 RX ADMIN — Medication 5.25 MG: at 21:31

## 2020-07-26 RX ADMIN — BUDESONIDE 0.5 MG: 0.5 INHALANT RESPIRATORY (INHALATION) at 19:52

## 2020-07-26 RX ADMIN — DILTIAZEM HYDROCHLORIDE 120 MG: 120 CAPSULE, COATED, EXTENDED RELEASE ORAL at 07:58

## 2020-07-26 RX ADMIN — HUMAN INSULIN 9 UNITS: 100 INJECTION, SOLUTION SUBCUTANEOUS at 17:01

## 2020-07-26 RX ADMIN — FLUTICASONE PROPIONATE 2 SPRAY: 50 SPRAY, METERED NASAL at 08:00

## 2020-07-26 RX ADMIN — HUMAN INSULIN 9 UNITS: 100 INJECTION, SOLUTION SUBCUTANEOUS at 21:15

## 2020-07-27 LAB
ANION GAP SERPL CALCULATED.3IONS-SCNC: 12 MMOL/L (ref 5–15)
BASOPHILS # BLD AUTO: 0.03 10*3/MM3 (ref 0–0.2)
BASOPHILS NFR BLD AUTO: 0.2 % (ref 0–1.5)
BUN SERPL-MCNC: 25 MG/DL (ref 8–23)
BUN/CREAT SERPL: 54.3 (ref 7–25)
CALCIUM SPEC-SCNC: 9.4 MG/DL (ref 8.6–10.5)
CHLORIDE SERPL-SCNC: 98 MMOL/L (ref 98–107)
CO2 SERPL-SCNC: 27 MMOL/L (ref 22–29)
CREAT SERPL-MCNC: 0.46 MG/DL (ref 0.57–1)
DEPRECATED RDW RBC AUTO: 49 FL (ref 37–54)
EOSINOPHIL # BLD AUTO: 0.03 10*3/MM3 (ref 0–0.4)
EOSINOPHIL NFR BLD AUTO: 0.2 % (ref 0.3–6.2)
ERYTHROCYTE [DISTWIDTH] IN BLOOD BY AUTOMATED COUNT: 15.4 % (ref 12.3–15.4)
GFR SERPL CREATININE-BSD FRML MDRD: 133 ML/MIN/1.73
GLUCOSE BLDC GLUCOMTR-MCNC: 153 MG/DL (ref 70–130)
GLUCOSE BLDC GLUCOMTR-MCNC: 183 MG/DL (ref 70–130)
GLUCOSE BLDC GLUCOMTR-MCNC: 315 MG/DL (ref 70–130)
GLUCOSE BLDC GLUCOMTR-MCNC: 317 MG/DL (ref 70–130)
GLUCOSE BLDC GLUCOMTR-MCNC: 404 MG/DL (ref 70–130)
GLUCOSE SERPL-MCNC: 299 MG/DL (ref 65–99)
HCT VFR BLD AUTO: 30.9 % (ref 34–46.6)
HGB BLD-MCNC: 9.7 G/DL (ref 12–15.9)
IMM GRANULOCYTES # BLD AUTO: 0.1 10*3/MM3 (ref 0–0.05)
IMM GRANULOCYTES NFR BLD AUTO: 0.8 % (ref 0–0.5)
LYMPHOCYTES # BLD AUTO: 0.87 10*3/MM3 (ref 0.7–3.1)
LYMPHOCYTES NFR BLD AUTO: 7.1 % (ref 19.6–45.3)
MCH RBC QN AUTO: 27.2 PG (ref 26.6–33)
MCHC RBC AUTO-ENTMCNC: 31.4 G/DL (ref 31.5–35.7)
MCV RBC AUTO: 86.8 FL (ref 79–97)
MONOCYTES # BLD AUTO: 0.7 10*3/MM3 (ref 0.1–0.9)
MONOCYTES NFR BLD AUTO: 5.7 % (ref 5–12)
NEUTROPHILS NFR BLD AUTO: 10.54 10*3/MM3 (ref 1.7–7)
NEUTROPHILS NFR BLD AUTO: 86 % (ref 42.7–76)
NRBC BLD AUTO-RTO: 0 /100 WBC (ref 0–0.2)
PLATELET # BLD AUTO: 364 10*3/MM3 (ref 140–450)
PMV BLD AUTO: 10 FL (ref 6–12)
POTASSIUM SERPL-SCNC: 4.5 MMOL/L (ref 3.5–5.2)
RBC # BLD AUTO: 3.56 10*6/MM3 (ref 3.77–5.28)
SODIUM SERPL-SCNC: 137 MMOL/L (ref 136–145)
WBC # BLD AUTO: 12.27 10*3/MM3 (ref 3.4–10.8)

## 2020-07-27 PROCEDURE — 94799 UNLISTED PULMONARY SVC/PX: CPT

## 2020-07-27 PROCEDURE — 63710000001 INSULIN REGULAR HUMAN PER 5 UNITS: Performed by: FAMILY MEDICINE

## 2020-07-27 PROCEDURE — 97110 THERAPEUTIC EXERCISES: CPT

## 2020-07-27 PROCEDURE — 63710000001 INSULIN DETEMIR PER 5 UNITS: Performed by: FAMILY MEDICINE

## 2020-07-27 PROCEDURE — 82962 GLUCOSE BLOOD TEST: CPT

## 2020-07-27 PROCEDURE — 63710000001 INSULIN LISPRO (HUMAN) PER 5 UNITS: Performed by: NURSE PRACTITIONER

## 2020-07-27 PROCEDURE — 63710000001 PREDNISONE PER 5 MG: Performed by: FAMILY MEDICINE

## 2020-07-27 PROCEDURE — 85025 COMPLETE CBC W/AUTO DIFF WBC: CPT | Performed by: FAMILY MEDICINE

## 2020-07-27 PROCEDURE — 80048 BASIC METABOLIC PNL TOTAL CA: CPT | Performed by: FAMILY MEDICINE

## 2020-07-27 PROCEDURE — 94664 DEMO&/EVAL PT USE INHALER: CPT

## 2020-07-27 RX ADMIN — BUDESONIDE 0.5 MG: 0.5 INHALANT RESPIRATORY (INHALATION) at 20:19

## 2020-07-27 RX ADMIN — DILTIAZEM HYDROCHLORIDE 120 MG: 120 CAPSULE, COATED, EXTENDED RELEASE ORAL at 08:22

## 2020-07-27 RX ADMIN — INSULIN LISPRO 10 UNITS: 100 INJECTION, SOLUTION INTRAVENOUS; SUBCUTANEOUS at 17:08

## 2020-07-27 RX ADMIN — LINAGLIPTIN 5 MG: 5 TABLET, FILM COATED ORAL at 08:23

## 2020-07-27 RX ADMIN — FLUTICASONE PROPIONATE 2 SPRAY: 50 SPRAY, METERED NASAL at 08:23

## 2020-07-27 RX ADMIN — SODIUM CHLORIDE, PRESERVATIVE FREE 10 ML: 5 INJECTION INTRAVENOUS at 21:33

## 2020-07-27 RX ADMIN — IPRATROPIUM BROMIDE AND ALBUTEROL SULFATE 3 ML: 2.5; .5 SOLUTION RESPIRATORY (INHALATION) at 09:34

## 2020-07-27 RX ADMIN — ATORVASTATIN CALCIUM 80 MG: 40 TABLET, FILM COATED ORAL at 21:33

## 2020-07-27 RX ADMIN — INSULIN LISPRO 3 UNITS: 100 INJECTION, SOLUTION INTRAVENOUS; SUBCUTANEOUS at 12:42

## 2020-07-27 RX ADMIN — ESCITALOPRAM 10 MG: 10 TABLET, FILM COATED ORAL at 08:22

## 2020-07-27 RX ADMIN — HYDROXYZINE HYDROCHLORIDE 25 MG: 25 TABLET ORAL at 21:32

## 2020-07-27 RX ADMIN — GUAIFENESIN 1200 MG: 600 TABLET, EXTENDED RELEASE ORAL at 08:22

## 2020-07-27 RX ADMIN — HUMAN INSULIN 7 UNITS: 100 INJECTION, SOLUTION SUBCUTANEOUS at 02:45

## 2020-07-27 RX ADMIN — INSULIN DETEMIR 15 UNITS: 100 INJECTION, SOLUTION SUBCUTANEOUS at 08:28

## 2020-07-27 RX ADMIN — BUDESONIDE 0.5 MG: 0.5 INHALANT RESPIRATORY (INHALATION) at 06:22

## 2020-07-27 RX ADMIN — IPRATROPIUM BROMIDE AND ALBUTEROL SULFATE 3 ML: 2.5; .5 SOLUTION RESPIRATORY (INHALATION) at 14:10

## 2020-07-27 RX ADMIN — PREDNISONE 10 MG: 20 TABLET ORAL at 08:22

## 2020-07-27 RX ADMIN — PREDNISONE 10 MG: 20 TABLET ORAL at 17:05

## 2020-07-27 RX ADMIN — FERROUS SULFATE TAB 325 MG (65 MG ELEMENTAL FE) 325 MG: 325 (65 FE) TAB at 17:05

## 2020-07-27 RX ADMIN — PANTOPRAZOLE SODIUM 40 MG: 40 TABLET, DELAYED RELEASE ORAL at 08:28

## 2020-07-27 RX ADMIN — IPRATROPIUM BROMIDE AND ALBUTEROL SULFATE 3 ML: 2.5; .5 SOLUTION RESPIRATORY (INHALATION) at 06:16

## 2020-07-27 RX ADMIN — Medication 5.25 MG: at 21:43

## 2020-07-27 RX ADMIN — SODIUM CHLORIDE, PRESERVATIVE FREE 10 ML: 5 INJECTION INTRAVENOUS at 08:23

## 2020-07-27 RX ADMIN — GUAIFENESIN 1200 MG: 600 TABLET, EXTENDED RELEASE ORAL at 21:33

## 2020-07-27 RX ADMIN — IPRATROPIUM BROMIDE AND ALBUTEROL SULFATE 3 ML: 2.5; .5 SOLUTION RESPIRATORY (INHALATION) at 20:19

## 2020-07-27 RX ADMIN — INSULIN DETEMIR 15 UNITS: 100 INJECTION, SOLUTION SUBCUTANEOUS at 21:33

## 2020-07-27 RX ADMIN — FERROUS SULFATE TAB 325 MG (65 MG ELEMENTAL FE) 325 MG: 325 (65 FE) TAB at 08:22

## 2020-07-27 RX ADMIN — CLOPIDOGREL 75 MG: 75 TABLET, FILM COATED ORAL at 08:22

## 2020-07-27 RX ADMIN — INSULIN LISPRO 3 UNITS: 100 INJECTION, SOLUTION INTRAVENOUS; SUBCUTANEOUS at 08:28

## 2020-07-28 ENCOUNTER — READMISSION MANAGEMENT (OUTPATIENT)
Dept: CALL CENTER | Facility: HOSPITAL | Age: 75
End: 2020-07-28

## 2020-07-28 VITALS
HEART RATE: 89 BPM | TEMPERATURE: 97.7 F | RESPIRATION RATE: 16 BRPM | SYSTOLIC BLOOD PRESSURE: 123 MMHG | HEIGHT: 60 IN | OXYGEN SATURATION: 93 % | WEIGHT: 122 LBS | BODY MASS INDEX: 23.95 KG/M2 | DIASTOLIC BLOOD PRESSURE: 74 MMHG

## 2020-07-28 LAB
GLUCOSE BLDC GLUCOMTR-MCNC: 260 MG/DL (ref 70–130)
GLUCOSE BLDC GLUCOMTR-MCNC: 293 MG/DL (ref 70–130)

## 2020-07-28 PROCEDURE — 97535 SELF CARE MNGMENT TRAINING: CPT

## 2020-07-28 PROCEDURE — 63710000001 INSULIN DETEMIR PER 5 UNITS: Performed by: FAMILY MEDICINE

## 2020-07-28 PROCEDURE — 94799 UNLISTED PULMONARY SVC/PX: CPT

## 2020-07-28 PROCEDURE — 82962 GLUCOSE BLOOD TEST: CPT

## 2020-07-28 PROCEDURE — 63710000001 PREDNISONE PER 5 MG: Performed by: FAMILY MEDICINE

## 2020-07-28 PROCEDURE — 97110 THERAPEUTIC EXERCISES: CPT

## 2020-07-28 PROCEDURE — 63710000001 INSULIN LISPRO (HUMAN) PER 5 UNITS: Performed by: NURSE PRACTITIONER

## 2020-07-28 RX ORDER — PREDNISONE 10 MG/1
TABLET ORAL
Qty: 15 TABLET | Refills: 0 | Status: SHIPPED | OUTPATIENT
Start: 2020-07-28 | End: 2020-08-07

## 2020-07-28 RX ADMIN — SODIUM CHLORIDE, PRESERVATIVE FREE 10 ML: 5 INJECTION INTRAVENOUS at 08:44

## 2020-07-28 RX ADMIN — INSULIN LISPRO 8 UNITS: 100 INJECTION, SOLUTION INTRAVENOUS; SUBCUTANEOUS at 12:40

## 2020-07-28 RX ADMIN — LINAGLIPTIN 5 MG: 5 TABLET, FILM COATED ORAL at 08:41

## 2020-07-28 RX ADMIN — FLUTICASONE PROPIONATE 2 SPRAY: 50 SPRAY, METERED NASAL at 08:44

## 2020-07-28 RX ADMIN — ESCITALOPRAM 10 MG: 10 TABLET, FILM COATED ORAL at 08:41

## 2020-07-28 RX ADMIN — FERROUS SULFATE TAB 325 MG (65 MG ELEMENTAL FE) 325 MG: 325 (65 FE) TAB at 08:41

## 2020-07-28 RX ADMIN — BUDESONIDE 0.5 MG: 0.5 INHALANT RESPIRATORY (INHALATION) at 06:08

## 2020-07-28 RX ADMIN — CLOPIDOGREL 75 MG: 75 TABLET, FILM COATED ORAL at 08:41

## 2020-07-28 RX ADMIN — GUAIFENESIN 1200 MG: 600 TABLET, EXTENDED RELEASE ORAL at 08:41

## 2020-07-28 RX ADMIN — DILTIAZEM HYDROCHLORIDE 120 MG: 120 CAPSULE, COATED, EXTENDED RELEASE ORAL at 08:41

## 2020-07-28 RX ADMIN — IPRATROPIUM BROMIDE AND ALBUTEROL SULFATE 3 ML: 2.5; .5 SOLUTION RESPIRATORY (INHALATION) at 14:03

## 2020-07-28 RX ADMIN — IPRATROPIUM BROMIDE AND ALBUTEROL SULFATE 3 ML: 2.5; .5 SOLUTION RESPIRATORY (INHALATION) at 10:07

## 2020-07-28 RX ADMIN — INSULIN LISPRO 8 UNITS: 100 INJECTION, SOLUTION INTRAVENOUS; SUBCUTANEOUS at 08:41

## 2020-07-28 RX ADMIN — PANTOPRAZOLE SODIUM 40 MG: 40 TABLET, DELAYED RELEASE ORAL at 08:41

## 2020-07-28 RX ADMIN — INSULIN DETEMIR 15 UNITS: 100 INJECTION, SOLUTION SUBCUTANEOUS at 08:42

## 2020-07-28 RX ADMIN — IPRATROPIUM BROMIDE AND ALBUTEROL SULFATE 3 ML: 2.5; .5 SOLUTION RESPIRATORY (INHALATION) at 06:08

## 2020-07-28 RX ADMIN — PREDNISONE 10 MG: 20 TABLET ORAL at 08:41

## 2020-07-29 ENCOUNTER — TRANSITIONAL CARE MANAGEMENT TELEPHONE ENCOUNTER (OUTPATIENT)
Dept: CALL CENTER | Facility: HOSPITAL | Age: 75
End: 2020-07-29

## 2020-07-29 NOTE — OUTREACH NOTE
Call Center TCM Note      Responses   [facility] patient discharged from?  [place]   COVID-19 Test Status  Negative   Does the patient have one of the following disease processes/diagnoses(primary or secondary)?  COPD/Pneumonia   Was the primary reason for admission:  Pneumonia   TCM attempt successful?  Yes [Vinod are on verbal release]   Call start time  1322   Call end time  1333   Discharge diagnosis  Acute on chronic respiratory failure COPD exacerbation    Is patient permission given to speak with other caregiver?  Yes   List who call center can speak with  Mariaa-sister and Tri-daughter   Person spoke with today (if not patient) and relationship  Tri-daughter and patient    Meds reviewed with patient/caregiver?  Yes   Is the patient having any side effects they believe may be caused by any medication additions or changes?  No   Does the patient have all medications ordered at discharge?  Yes   Is the patient taking all medications as directed (includes completed medication regime)?  Yes   Does the patient have a primary care provider?   Yes   Does the patient have an appointment with their PCP or pulmonologist within 7 days of discharge?  Greater than 7 days   Comments regarding PCP  8/5/20 at 3:45 pm    What is preventing the patient from scheduling follow up appointments within 7 days of discharge?  Earlier appointment not available   Nursing Interventions  Verified appointment date/time/provider   Has the patient kept scheduled appointments due by today?  N/A   What is the Home health agency?    [facility]    Has home health visited the patient within 72 hours of discharge?  Call prior to 72 hours   What DME was ordered?  trilogy machice via [employer]   Has all DME been delivered?  Yes   DME comments  Pt reports she believes the trilogy machine really made a big difference for her   Pulse Ox monitoring  Intermittent   Pulse Ox device source  Patient   Psychosocial issues?  No   Psychosocial  comments  Pt lives with her daughter Tri. Tri is on vacation at time of call. Pt has been staying with sister Mariaa since May as patient has been unable to make the trip home.    Did the patient receive a copy of their discharge instructions?  Yes   Nursing interventions  Reviewed instructions with patient   What is the patient's perception of their health status since discharge?  Improving   Nursing Interventions  Nurse provided patient education   Are the patient's immunizations up to date?   Yes   Is the patient/caregiver able to teach back the hierarchy of who to call/visit for symptoms/problems? PCP, Specialist, Home health nurse, Urgent Care, ED, 911  Yes   Is the patient able to teach back COPD zones?  Yes   Nursing interventions  Education provided on various zones   Patient reports what zone on this call?  Green Zone   Green Zone  Reports doing well, Breathing without shortness of breath, Sleeping well, Appetite is good   Green Zone interventions:  Do not smoke, Avoid indoor/outdoor triggers, Take daily medications, Use oxygen as prescribed   TCM call completed?  Yes   Wrap up additional comments  Tri update phone numbers in Epic. RN also removed contact info for Deidre as patient doesn't have a daughter with that name.           Laura Matias RN    7/29/2020, 13:34

## 2020-07-29 NOTE — OUTREACH NOTE
Prep Survey      Responses   [facility] patient discharged from?  [place]   Is LACE score < 7 ?  No   Eligibility  [facility]   Date of Admission  07/23/20   Date of Discharge  07/28/20   Discharge Disposition  Home-Health Care Sv   Discharge diagnosis  Acute on chronic respiratory failure COPD exacerbation    COVID-19 Test Status  Negative   Does the patient have one of the following disease processes/diagnoses(primary or secondary)?  COPD/Pneumonia   Does the patient have Home health ordered?  Yes   What is the Home health agency?    [facility]     Is there a DME ordered?  Yes   What DME was ordered?  [employer] [facility]xton via [employer]   Prep survey completed?  Yes          Roya Martin RN

## 2020-07-30 ENCOUNTER — DOCUMENTATION (OUTPATIENT)
Dept: SOCIAL WORK | Facility: HOSPITAL | Age: 75
End: 2020-07-30

## 2020-08-05 ENCOUNTER — TELEPHONE (OUTPATIENT)
Dept: FAMILY MEDICINE CLINIC | Facility: CLINIC | Age: 75
End: 2020-08-05

## 2020-08-05 ENCOUNTER — READMISSION MANAGEMENT (OUTPATIENT)
Dept: CALL CENTER | Facility: HOSPITAL | Age: 75
End: 2020-08-05

## 2020-08-05 DIAGNOSIS — J44.9 COPD, SEVERE (HCC): Primary | ICD-10-CM

## 2020-08-05 NOTE — TELEPHONE ENCOUNTER
Roya with [facility] home health called stating patient is needing an order for a bedside commode sent to [facility]. You can reach her at [phone] with any questions

## 2020-08-05 NOTE — OUTREACH NOTE
COPD/PN Week 2 Survey      Responses   [facility] patient discharged from?  [place]   COVID-19 Test Status  Negative   Does the patient have one of the following disease processes/diagnoses(primary or secondary)?  COPD/Pneumonia   Was the primary reason for admission:  Pneumonia   Week 2 attempt successful?  Yes   Call start time  1349   Call end time  1354   Discharge diagnosis  Acute on chronic respiratory failure COPD exacerbation    Meds reviewed with patient/caregiver?  Yes   Is the patient having any side effects they believe may be caused by any medication additions or changes?  No   Does the patient have all medications ordered at discharge?  Yes   Is the patient taking all medications as directed (includes completed medication regime)?  Yes   Does the patient have a primary care provider?   Yes   Has the patient kept scheduled appointments due by today?  N/A   What is the Home health agency?    [facility]    Has home health visited the patient within 72 hours of discharge?  Yes   Pulse Ox monitoring  Intermittent   Pulse Ox device source  Patient   O2 Sat comments  91-96%   Psychosocial issues?  No   Did the patient receive a copy of their discharge instructions?  Yes   Nursing interventions  Reviewed instructions with patient   What is the patient's perception of their health status since discharge?  Improving   Nursing Interventions  Nurse provided patient education   Is the patient/caregiver able to teach back the hierarchy of who to call/visit for symptoms/problems? PCP, Specialist, Home health nurse, Urgent Care, ED, 911  Yes   Additional teach back comments  pt states breathing has been comfortable when avoiding outside high heat and humidity   Is the patient able to teach back COPD zones?  Yes   Nursing interventions  Education provided on various zones   Patient reports what zone on this call?  Green Zone   Green Zone  Reports doing well, Breathing without shortness of breath, Usual activity  and exercise level, Usual amount of phlegm/mucus without difficulty coughing up, Sleeping well, Appetite is good   Green Zone interventions:  Use oxygen as prescribed, Avoid indoor/outdoor triggers, Take daily medications   Is the patient/caregiver able to teach back signs and symptoms of worsening condition:  Fever/chills, Shortness of breath, Chest pain   Is the patient/caregiver able to teach back importance of completing antibiotic course of treatment?  Yes   Week 2 call completed?  Yes          Kriss Wheeler RN

## 2020-08-17 ENCOUNTER — READMISSION MANAGEMENT (OUTPATIENT)
Dept: CALL CENTER | Facility: HOSPITAL | Age: 75
End: 2020-08-17

## 2020-08-17 NOTE — OUTREACH NOTE
COPD/PN Week 3 Survey      Responses   [facility] patient discharged from?  [place]   COVID-19 Test Status  Negative   Does the patient have one of the following disease processes/diagnoses(primary or secondary)?  COPD/Pneumonia   Was the primary reason for admission:  Pneumonia   Week 3 attempt successful?  No   Unsuccessful attempts  Attempt 2          Monalisa García RN

## 2021-06-24 NOTE — PROGRESS NOTES
"Pharmacy Dosing Service  Pharmacokinetics  Vancomycin Initial Evaluation    Assessment/Action/Plan:  Initiated Vancomycin 1000 mg IVPB every 24 hours. Vancomycin levels not ordered at this time..  Current vancomycin end date: 07/14/20. Pharmacy will monitor renal function and adjust dose accordingly.     Subjective:  Jessica Gregg is a 74 y.o. female with a Vancomycin \"Pharmacy to Dose\" consult for the treatment of Pneumonia/Fever .    Objective:  Ht: 152.4 cm (60\"); Wt: 54.6 kg (120 lb 7 oz)  Estimated Creatinine Clearance: 47.8 mL/min (by C-G formula based on SCr of 0.68 mg/dL).   Lab Results   Component Value Date    CREATININE 0.68 07/11/2020    CREATININE 0.49 (L) 07/10/2020    CREATININE 0.58 06/04/2020      Lab Results   Component Value Date    WBC 11.73 (H) 07/11/2020    WBC 13.85 (H) 07/10/2020    WBC 13.95 (H) 06/04/2020      Baseline culture results:  Microbiology Results (last 10 days)       Procedure Component Value - Date/Time    COVID PRE-OP / PRE-PROCEDURE SCREENING ORDER (NO ISOLATION) - Swab, Nasopharynx [[id number]] Collected:  07/10/20 1735    Lab Status:  Final result Specimen:  Swab from Nasopharynx Updated:  07/10/20 1821    Narrative:       The following orders were created for panel order COVID PRE-OP / PRE-PROCEDURE SCREENING ORDER (NO ISOLATION) - Swab, Nasopharynx.  Procedure                               Abnormality         Status                     ---------                               -----------         ------                     COVID-19, [facility] IN-HOUS...[[id number]]  Normal              Final result                 Please view results for these tests on the individual orders.    COVID-19, [facility] IN-HOUSE,NP Swab (NO TRANSPORT MEDIA) 2 HR TAT - Swab, Nasopharynx [[id number]]  (Normal) Collected:  07/10/20 1735    Lab Status:  Final result Specimen:  Swab from Nasopharynx Updated:  07/10/20 1821     COVID19 Not Detected    Narrative:       Fact sheet for providers: " CONSULT NOTE    DATE OF SERVICE: Date of Surgery: ***         REQUESTING PHYSICIAN: EDSON Walton      HISTORY OF PRESENT ILLNESS:  Maggy Myers is being seen at the request of EDSON Machado for the complaint of leukopenia.  Patient is a 65-year-old female with past medical history significant for abnormal Pap smear of cervix and radial head fracture who was recently evaluated at nurse practitioner's office and was noted to have low white cell count and therefore is a was referred to [facility] for further workup.    On 2021 patient had CBC which is indicative of white cell count of 3 hemoglobin of 12.7 and platelet count of 175 in absence of abnormal forms identified in the peripheral circulation on automated cytometry other labs significant for low vitamin-D levels.    Hematological history on reviewing the long-term trend the white cell count 4 patient during 2020 patient the was noted to have white cell count ranging between 3.7 and 2.7.  Differential count was consistent with lymphopenia and neutropenia on reviewing lab tests dated 2011 at [facility] and patient was noted to have absolute lymphopenia    Patient diagnosed with low count dec 2020  blod transfuisn-n  No bleeding  No blood thinner .was ob asprin in past.    No cancer diagnosed    GYNECOLOGIOCAL  menarch 13    a0  bc >10 years   Menopause age 50  REVIEW OF SYSTEMS:   Review of Systems    Past Medical History:   Diagnosis Date   • Abnormal Pap smear of cervix     reactive, reparative atypical squamous cell   • Radial head fracture, closed 2009       Past Surgical History:   Procedure Laterality Date   • Colonoscopy  2008   • Colposcopy  2000    reactive, reparative atypical squamous cell   • Eye surgery      strabismus       Social History     Tobacco Use   • Smoking status: Never Smoker   • Smokeless tobacco: Never Used   Vaping Use   • Vaping Use: never  https://www.fda.gov/media/835706/download     Fact sheet for patients: https://www.fda.gov/media/438213/download            Naheed AriasD  07/11/20 07:32     used   Substance Use Topics   • Alcohol use: Yes     Comment: occasionally   • Drug use: No       Family History   Problem Relation Age of Onset   • Diabetes Mother    • Stroke Mother    • High blood pressure Mother    • Macular degeneration Mother    • High cholesterol Mother    • Cancer, Colon Mother    • Cancer, Lung Mother    • Thyroid Mother    • Cancer Father         bladder   • Stroke Father    • Diabetes Maternal Aunt    • Diabetes Maternal Uncle    • Cancer Paternal Grandfather         lung?   • High blood pressure Brother    • Bleeding Disorder Brother         blood clots   • Cancer Brother         Melanoma, removed.    • Cancer, Pancreatic Brother    • Cancer Daughter         rare uterine cancer. Endometrial stromal sarcoma       PHYSICAL EXAMINATION:  Physical Exam   LABS:  Reviewed and confirmed in the EMR (Electronic Medical Record).    ASSESSMENT AND PLAN:  1) LYMPHOPENIA WITH ABSOLUTE LYMPHOPENIA  Patient has history of absolute lymphopenia dating back 2011.  -multiple causes implicated including but not limited to various infections in especially viral, alcohol, oozing deficiency, medication like rituximab fludarabine or various other agents especially corticosteroids, autoimmune disorders.  Patient does have longstanding lymphopenia and therefore will rule out nutritional causes and continue to follow the patient and on worsening cytopenia would consider the patient for bone marrow biopsy.  -will obtain basic workup including B12 and folate and peripheral smear, LDH, SPEP, serum immunofixation and free light chain assay,.  -will follow up with patient in 4 weeks time with above findings and further discussion regarding additional WORKUP.      2) SIGNIFICANT FAMILY HISTORY OF CANCER.  -patient noted to have cancer in first-degree relative with daughter with right uterine cancer, mother with lung cancer and colon cancer and father with bladder cancer.  Patient's brother was diagnosed with pancreatic  cancer and melanoma as well as diagnosed with blood clots.  -patient will be a candidate for genetic counseling based on above-mentioned history and will refer her to genetics counseling.    3) AGE-APPROPRIATE CANCER SCREENING  -12/04/2020 mammogram BI-RADS category 1 negative  -01/08/2021 colonoscopy.lastencte  Diverticulosis and internal hemorrhoids and patient is recommended for repeat colonoscopy in 5 years unless clinical concerns arise in meantime      Plan  -obtain today:cbc,cmp,B12 and folate and peripheral smear, LDH, SPEP, serum immunofixation and free light chain assay,  -please follow up in 4 weeks  With cbc and cmp and MD follow up.

## 2021-11-09 ENCOUNTER — TELEPHONE (OUTPATIENT)
Dept: FAMILY MEDICINE CLINIC | Facility: CLINIC | Age: 76
End: 2021-11-09